# Patient Record
Sex: FEMALE | Race: WHITE | NOT HISPANIC OR LATINO
[De-identification: names, ages, dates, MRNs, and addresses within clinical notes are randomized per-mention and may not be internally consistent; named-entity substitution may affect disease eponyms.]

---

## 2017-01-12 ENCOUNTER — APPOINTMENT (OUTPATIENT)
Dept: NEUROSURGERY | Facility: CLINIC | Age: 37
End: 2017-01-12

## 2017-01-12 VITALS
HEIGHT: 63 IN | BODY MASS INDEX: 29.23 KG/M2 | DIASTOLIC BLOOD PRESSURE: 70 MMHG | SYSTOLIC BLOOD PRESSURE: 110 MMHG | WEIGHT: 165 LBS | HEART RATE: 112 BPM

## 2017-01-12 DIAGNOSIS — Q06.8 OTHER SPECIFIED CONGENITAL MALFORMATIONS OF SPINAL CORD: ICD-10-CM

## 2017-01-17 PROBLEM — Q06.8 TETHERED CORD SYNDROME: Status: ACTIVE | Noted: 2017-01-17

## 2017-01-19 ENCOUNTER — APPOINTMENT (OUTPATIENT)
Dept: SPINE | Facility: CLINIC | Age: 37
End: 2017-01-19

## 2017-01-19 VITALS
WEIGHT: 165 LBS | HEIGHT: 63 IN | DIASTOLIC BLOOD PRESSURE: 74 MMHG | SYSTOLIC BLOOD PRESSURE: 114 MMHG | BODY MASS INDEX: 29.23 KG/M2

## 2017-01-21 ENCOUNTER — APPOINTMENT (OUTPATIENT)
Dept: MRI IMAGING | Facility: CLINIC | Age: 37
End: 2017-01-21

## 2017-01-21 ENCOUNTER — APPOINTMENT (OUTPATIENT)
Dept: CT IMAGING | Facility: CLINIC | Age: 37
End: 2017-01-21

## 2017-01-21 ENCOUNTER — OUTPATIENT (OUTPATIENT)
Dept: OUTPATIENT SERVICES | Facility: HOSPITAL | Age: 37
LOS: 1 days | End: 2017-01-21
Payer: MEDICARE

## 2017-01-21 DIAGNOSIS — Z00.8 ENCOUNTER FOR OTHER GENERAL EXAMINATION: ICD-10-CM

## 2017-01-21 DIAGNOSIS — Z98.89 OTHER SPECIFIED POSTPROCEDURAL STATES: Chronic | ICD-10-CM

## 2017-01-21 DIAGNOSIS — Q06.8 OTHER SPECIFIED CONGENITAL MALFORMATIONS OF SPINAL CORD: ICD-10-CM

## 2017-01-21 PROCEDURE — 72131 CT LUMBAR SPINE W/O DYE: CPT

## 2017-01-21 PROCEDURE — 72148 MRI LUMBAR SPINE W/O DYE: CPT

## 2017-01-21 PROCEDURE — 76376 3D RENDER W/INTRP POSTPROCES: CPT

## 2017-01-25 ENCOUNTER — APPOINTMENT (OUTPATIENT)
Dept: SPINE | Facility: CLINIC | Age: 37
End: 2017-01-25

## 2017-01-25 VITALS
SYSTOLIC BLOOD PRESSURE: 124 MMHG | HEIGHT: 63 IN | WEIGHT: 165 LBS | BODY MASS INDEX: 29.23 KG/M2 | DIASTOLIC BLOOD PRESSURE: 78 MMHG

## 2017-01-29 ENCOUNTER — OUTPATIENT (OUTPATIENT)
Dept: OUTPATIENT SERVICES | Facility: HOSPITAL | Age: 37
LOS: 1 days | End: 2017-01-29
Payer: MEDICARE

## 2017-01-29 ENCOUNTER — APPOINTMENT (OUTPATIENT)
Dept: MRI IMAGING | Facility: CLINIC | Age: 37
End: 2017-01-29

## 2017-01-29 DIAGNOSIS — Z98.89 OTHER SPECIFIED POSTPROCEDURAL STATES: Chronic | ICD-10-CM

## 2017-01-29 DIAGNOSIS — Q06.8 OTHER SPECIFIED CONGENITAL MALFORMATIONS OF SPINAL CORD: ICD-10-CM

## 2017-01-29 PROCEDURE — 72157 MRI CHEST SPINE W/O & W/DYE: CPT

## 2017-01-29 PROCEDURE — A9585: CPT

## 2017-02-01 ENCOUNTER — OUTPATIENT (OUTPATIENT)
Dept: OUTPATIENT SERVICES | Facility: HOSPITAL | Age: 37
LOS: 1 days | End: 2017-02-01
Payer: MEDICARE

## 2017-02-01 ENCOUNTER — APPOINTMENT (OUTPATIENT)
Dept: RADIOLOGY | Facility: HOSPITAL | Age: 37
End: 2017-02-01

## 2017-02-01 DIAGNOSIS — Q06.8 OTHER SPECIFIED CONGENITAL MALFORMATIONS OF SPINAL CORD: ICD-10-CM

## 2017-02-01 DIAGNOSIS — Z98.89 OTHER SPECIFIED POSTPROCEDURAL STATES: Chronic | ICD-10-CM

## 2017-02-01 PROCEDURE — 62305 MYELOGRAPHY LUMBAR INJECTION: CPT

## 2017-02-01 PROCEDURE — 72132 CT LUMBAR SPINE W/DYE: CPT

## 2017-02-01 PROCEDURE — 72129 CT CHEST SPINE W/DYE: CPT

## 2017-02-03 ENCOUNTER — EMERGENCY (EMERGENCY)
Facility: HOSPITAL | Age: 37
LOS: 1 days | Discharge: HOME | End: 2017-02-03
Attending: EMERGENCY MEDICINE | Admitting: EMERGENCY MEDICINE
Payer: MEDICARE

## 2017-02-03 VITALS
SYSTOLIC BLOOD PRESSURE: 144 MMHG | OXYGEN SATURATION: 96 % | HEART RATE: 99 BPM | DIASTOLIC BLOOD PRESSURE: 87 MMHG | RESPIRATION RATE: 17 BRPM | WEIGHT: 164.91 LBS | HEIGHT: 63 IN | TEMPERATURE: 97 F

## 2017-02-03 VITALS
RESPIRATION RATE: 18 BRPM | HEART RATE: 77 BPM | TEMPERATURE: 98 F | DIASTOLIC BLOOD PRESSURE: 67 MMHG | SYSTOLIC BLOOD PRESSURE: 128 MMHG | OXYGEN SATURATION: 98 %

## 2017-02-03 DIAGNOSIS — Z98.89 OTHER SPECIFIED POSTPROCEDURAL STATES: Chronic | ICD-10-CM

## 2017-02-03 DIAGNOSIS — R51 HEADACHE: ICD-10-CM

## 2017-02-03 LAB
ALBUMIN SERPL ELPH-MCNC: 4.1 G/DL — SIGNIFICANT CHANGE UP (ref 3.3–5)
ALP SERPL-CCNC: 70 U/L — SIGNIFICANT CHANGE UP (ref 40–120)
ALT FLD-CCNC: 11 U/L RC — SIGNIFICANT CHANGE UP (ref 10–45)
ANION GAP SERPL CALC-SCNC: 10 MMOL/L — SIGNIFICANT CHANGE UP (ref 5–17)
APTT BLD: 35.4 SEC — SIGNIFICANT CHANGE UP (ref 27.5–37.4)
AST SERPL-CCNC: 10 U/L — SIGNIFICANT CHANGE UP (ref 10–40)
BASOPHILS # BLD AUTO: 0 K/UL — SIGNIFICANT CHANGE UP (ref 0–0.2)
BASOPHILS NFR BLD AUTO: 0.2 % — SIGNIFICANT CHANGE UP (ref 0–2)
BILIRUB SERPL-MCNC: 0.1 MG/DL — LOW (ref 0.2–1.2)
BUN SERPL-MCNC: 12 MG/DL — SIGNIFICANT CHANGE UP (ref 7–23)
CALCIUM SERPL-MCNC: 9.1 MG/DL — SIGNIFICANT CHANGE UP (ref 8.4–10.5)
CHLORIDE SERPL-SCNC: 107 MMOL/L — SIGNIFICANT CHANGE UP (ref 96–108)
CO2 SERPL-SCNC: 25 MMOL/L — SIGNIFICANT CHANGE UP (ref 22–31)
CREAT SERPL-MCNC: 0.72 MG/DL — SIGNIFICANT CHANGE UP (ref 0.5–1.3)
EOSINOPHIL # BLD AUTO: 0.1 K/UL — SIGNIFICANT CHANGE UP (ref 0–0.5)
EOSINOPHIL NFR BLD AUTO: 1.4 % — SIGNIFICANT CHANGE UP (ref 0–6)
GLUCOSE SERPL-MCNC: 90 MG/DL — SIGNIFICANT CHANGE UP (ref 70–99)
HCT VFR BLD CALC: 44.4 % — SIGNIFICANT CHANGE UP (ref 34.5–45)
HGB BLD-MCNC: 15 G/DL — SIGNIFICANT CHANGE UP (ref 11.5–15.5)
INR BLD: 1.01 RATIO — SIGNIFICANT CHANGE UP (ref 0.88–1.16)
LYMPHOCYTES # BLD AUTO: 3.3 K/UL — SIGNIFICANT CHANGE UP (ref 1–3.3)
LYMPHOCYTES # BLD AUTO: 33.7 % — SIGNIFICANT CHANGE UP (ref 13–44)
MCHC RBC-ENTMCNC: 32.2 PG — SIGNIFICANT CHANGE UP (ref 27–34)
MCHC RBC-ENTMCNC: 33.8 GM/DL — SIGNIFICANT CHANGE UP (ref 32–36)
MCV RBC AUTO: 95.4 FL — SIGNIFICANT CHANGE UP (ref 80–100)
MONOCYTES # BLD AUTO: 0.6 K/UL — SIGNIFICANT CHANGE UP (ref 0–0.9)
MONOCYTES NFR BLD AUTO: 5.9 % — SIGNIFICANT CHANGE UP (ref 2–14)
NEUTROPHILS # BLD AUTO: 5.8 K/UL — SIGNIFICANT CHANGE UP (ref 1.8–7.4)
NEUTROPHILS NFR BLD AUTO: 58.8 % — SIGNIFICANT CHANGE UP (ref 43–77)
PLATELET # BLD AUTO: 268 K/UL — SIGNIFICANT CHANGE UP (ref 150–400)
POTASSIUM SERPL-MCNC: 4.1 MMOL/L — SIGNIFICANT CHANGE UP (ref 3.5–5.3)
POTASSIUM SERPL-SCNC: 4.1 MMOL/L — SIGNIFICANT CHANGE UP (ref 3.5–5.3)
PROT SERPL-MCNC: 6.7 G/DL — SIGNIFICANT CHANGE UP (ref 6–8.3)
PROTHROM AB SERPL-ACNC: 10.9 SEC — SIGNIFICANT CHANGE UP (ref 10–13.1)
RBC # BLD: 4.66 M/UL — SIGNIFICANT CHANGE UP (ref 3.8–5.2)
RBC # FLD: 11.3 % — SIGNIFICANT CHANGE UP (ref 10.3–14.5)
SODIUM SERPL-SCNC: 142 MMOL/L — SIGNIFICANT CHANGE UP (ref 135–145)
WBC # BLD: 9.8 K/UL — SIGNIFICANT CHANGE UP (ref 3.8–10.5)
WBC # FLD AUTO: 9.8 K/UL — SIGNIFICANT CHANGE UP (ref 3.8–10.5)

## 2017-02-03 PROCEDURE — 99284 EMERGENCY DEPT VISIT MOD MDM: CPT | Mod: 25

## 2017-02-03 PROCEDURE — 85027 COMPLETE CBC AUTOMATED: CPT

## 2017-02-03 PROCEDURE — 85610 PROTHROMBIN TIME: CPT

## 2017-02-03 PROCEDURE — 80053 COMPREHEN METABOLIC PANEL: CPT

## 2017-02-03 PROCEDURE — 99284 EMERGENCY DEPT VISIT MOD MDM: CPT

## 2017-02-03 PROCEDURE — 85730 THROMBOPLASTIN TIME PARTIAL: CPT

## 2017-02-03 RX ORDER — CAFFEINE 200 MG
300 TABLET ORAL ONCE
Qty: 0 | Refills: 0 | Status: DISCONTINUED | OUTPATIENT
Start: 2017-02-03 | End: 2017-02-03

## 2017-02-03 RX ORDER — SODIUM CHLORIDE 9 MG/ML
1000 INJECTION INTRAMUSCULAR; INTRAVENOUS; SUBCUTANEOUS ONCE
Qty: 0 | Refills: 0 | Status: COMPLETED | OUTPATIENT
Start: 2017-02-03 | End: 2017-02-03

## 2017-02-03 RX ORDER — DIPHENHYDRAMINE HCL 50 MG
25 CAPSULE ORAL ONCE
Qty: 0 | Refills: 0 | Status: COMPLETED | OUTPATIENT
Start: 2017-02-03 | End: 2017-02-03

## 2017-02-03 RX ORDER — CAFFEINE/SODIUM BENZOATE 250 MG/ML
500 VIAL (ML) INJECTION ONCE
Qty: 0 | Refills: 0 | Status: COMPLETED | OUTPATIENT
Start: 2017-02-03 | End: 2017-02-03

## 2017-02-03 RX ORDER — METOCLOPRAMIDE HCL 10 MG
10 TABLET ORAL ONCE
Qty: 0 | Refills: 0 | Status: DISCONTINUED | OUTPATIENT
Start: 2017-02-03 | End: 2017-02-03

## 2017-02-03 RX ORDER — METOCLOPRAMIDE HCL 10 MG
10 TABLET ORAL ONCE
Qty: 0 | Refills: 0 | Status: COMPLETED | OUTPATIENT
Start: 2017-02-03 | End: 2017-02-03

## 2017-02-03 RX ADMIN — SODIUM CHLORIDE 1000 MILLILITER(S): 9 INJECTION INTRAMUSCULAR; INTRAVENOUS; SUBCUTANEOUS at 18:54

## 2017-02-03 NOTE — ED ADULT NURSE NOTE - OBJECTIVE STATEMENT
Per pt report, she had a myelogram done two days ago for a as part of pre-surgical work-up for a spinal condition. Pt has a headache since and was told to come into the ED.  Pt is A&Ox4, skin is warm and dry, pt appears uncomfortable

## 2017-02-03 NOTE — ED PROVIDER NOTE - PHYSICAL EXAMINATION
Attending MD Garza:   VITALS: reviewed  GEN: NAD, A & O x 4  HEAD/EYES: NCAT, PERRL, EOMI, anicteric sclerae, no photophobia  ENT: mucus membranes moist, oropharynx WNL, trachea midline, no JVD  CHEST: lungs CTA with equal breath sounds bilaterally, chest wall nontender and atraumatic  CV: heart with reg rhythm S1, S2, no murmur; distal pulses intact and symmetric bilaterally  ABDOMEN: normoactive bowel sounds, soft, ND, nontender, no masses  : no CVAT, no suprapubic tenderness or fullness  MSK: extremities atraumatic and nontender, no edema. back is without midline tenderness, deformity or stepoff and is ranged painlessly. the neck has no midline tenderness, deformity, or stepoff, and is ranged painlessly. no meningismus  SKIN: punctate lumbar lesion on back without surrounding edema, erythema or bruising, otherwise normal  NEURO: CNII-XII are intact, strength is 5/5 throughout upper and lower extremities symmetric bilaterally, Sensation is intact to light touch throughout trunk and extremities symmetric bilaterally, Cerebellar function is intact by FNF, rapid alternating movments and Heel-to-Shin,   PSYCH: Affect appropriate

## 2017-02-03 NOTE — ED PROVIDER NOTE - PROGRESS NOTE DETAILS
Neurosurgery agrees low pressure headache, no other acute management, agree with anesthesia assessment for blood patch. Anesthesia at bedside Pain substantially improved after blood patch. Pt declining other pain rx at this time. Will lay supine >1hr then will be discharged with planned neurosurgical f/u.

## 2017-02-03 NOTE — ED ADULT NURSE NOTE - CHPI ED SYMPTOMS NEG
no dizziness/no vomiting/no numbness/no nausea/no loss of consciousness/no change in level of consciousness/no blurred vision/no fever/no confusion

## 2017-02-03 NOTE — ED PROVIDER NOTE - GASTROINTESTINAL NEGATIVE STATEMENT, MLM
no abdominal pain, no bloating, no constipation, no diarrhea, no nausea and no vomiting. mild nausea is present with severe pain. no abdominal pain, no bloating, no constipation, no diarrhea and no vomiting.

## 2017-02-03 NOTE — ED PROVIDER NOTE - FAMILY HISTORY
Father  Still living? Yes, Estimated age: 68  Family history of drug abuse, Age at diagnosis: Age Unknown  Family history of hepatitis C, Age at diagnosis: Age Unknown     Sibling  Still living? Yes, Estimated age: 41  Family history of coronary artery disease, Age at diagnosis: Age Unknown

## 2017-02-03 NOTE — ED PROVIDER NOTE - MEDICAL DECISION MAKING DETAILS
ATTENDING MD Kayla: positional headache 1d after myelogram. no infectious symptoms. likely low-pressure headache from procedure. neurosurgery and anesthesia will be consulted.

## 2017-02-03 NOTE — ED ADULT NURSE NOTE - PSH
H/O:   2004  History of cholecystectomy  laparoscopic 2005  History of tonsillectomy  age 18  S/P lumbar laminectomy  , complicated with CSF leak

## 2017-02-03 NOTE — ED PROVIDER NOTE - ATTENDING CONTRIBUTION TO CARE
ATTENDING MD: I, Robert Garza, have seen and evaluated this patient with the advance practice clinician.  I have discussed the history, exam ,and aspects of care with the ACP.  I have reviewed the ACP's note. I agree with the findings  unless other wise stated in the HPI, PE, MDM, and progress notes.

## 2017-02-03 NOTE — ED PROVIDER NOTE - CARE PLAN
Principal Discharge DX:	Headache, post-lumbar puncture  Instructions for follow-up, activity and diet:	Follow up with your Primary Care Physician within the next 2-3 days  Follow up with your Neurosurgeon Dr Murray within the next 3 days  Continue your current medication regimen  Return to the Emergency Room if you experience new or worsening symptoms

## 2017-02-03 NOTE — ED ADULT NURSE REASSESSMENT NOTE - NS ED NURSE REASSESS COMMENT FT1
Report received from POONAM Morocho. As per MD hold pain medication at this time. Following anesthesia procedure pt. states "the headache is much better." Speech clear. Breathing unlabored on RA. Skin warm pink and dry. Comfort measures in place. Family at bedside.

## 2017-02-03 NOTE — ED ADULT NURSE NOTE - PMH
Asthma  no recent exacerbations  Cervical disc disease    Irritable bowel syndrome without diarrhea  with constipation  Low back pain  chronic x 2 1/2 years

## 2017-02-03 NOTE — ED PROVIDER NOTE - PLAN OF CARE
Follow up with your Primary Care Physician within the next 2-3 days  Follow up with your Neurosurgeon Dr Murray within the next 3 days  Continue your current medication regimen  Return to the Emergency Room if you experience new or worsening symptoms

## 2017-02-03 NOTE — ED PROVIDER NOTE - OBJECTIVE STATEMENT
36 year old female w prior spinal surgery and CT Myelogram performed yesterday presents w complaint of positional pounding headache 9/10 in severity which is worse when she sits, stands and walks. She has had a prior CSF leak post op and feels that her current pain is similar. 36 year old female w prior spinal surgery and CT Myelogram performed yesterday presents w complaint of positional pounding headache 9/10 in severity which is worse when she sits, stands and walks. Better when laying supine. She has had a prior CSF leak post op and feels that her current pain is similar. No associated fevers, chills, visual disturbances, sensory changes, motor weakness. No difficulty urinating, urinary or fecal incontinence.

## 2017-02-08 ENCOUNTER — APPOINTMENT (OUTPATIENT)
Dept: SPINE | Facility: CLINIC | Age: 37
End: 2017-02-08

## 2017-02-08 VITALS
DIASTOLIC BLOOD PRESSURE: 80 MMHG | SYSTOLIC BLOOD PRESSURE: 126 MMHG | BODY MASS INDEX: 29.23 KG/M2 | HEIGHT: 63 IN | WEIGHT: 165 LBS

## 2017-02-24 ENCOUNTER — TRANSCRIPTION ENCOUNTER (OUTPATIENT)
Age: 37
End: 2017-02-24

## 2017-03-21 ENCOUNTER — TRANSCRIPTION ENCOUNTER (OUTPATIENT)
Age: 37
End: 2017-03-21

## 2017-03-24 ENCOUNTER — OUTPATIENT (OUTPATIENT)
Dept: OUTPATIENT SERVICES | Facility: HOSPITAL | Age: 37
LOS: 1 days | End: 2017-03-24
Payer: MEDICARE

## 2017-03-24 VITALS
SYSTOLIC BLOOD PRESSURE: 98 MMHG | RESPIRATION RATE: 18 BRPM | HEIGHT: 63 IN | HEART RATE: 91 BPM | DIASTOLIC BLOOD PRESSURE: 70 MMHG | TEMPERATURE: 98 F | WEIGHT: 186.07 LBS | OXYGEN SATURATION: 95 %

## 2017-03-24 DIAGNOSIS — Z98.890 OTHER SPECIFIED POSTPROCEDURAL STATES: Chronic | ICD-10-CM

## 2017-03-24 DIAGNOSIS — Z98.89 OTHER SPECIFIED POSTPROCEDURAL STATES: Chronic | ICD-10-CM

## 2017-03-24 DIAGNOSIS — Q06.8 OTHER SPECIFIED CONGENITAL MALFORMATIONS OF SPINAL CORD: ICD-10-CM

## 2017-03-24 DIAGNOSIS — Z01.818 ENCOUNTER FOR OTHER PREPROCEDURAL EXAMINATION: ICD-10-CM

## 2017-03-24 DIAGNOSIS — M54.5 LOW BACK PAIN: ICD-10-CM

## 2017-03-24 LAB
BLD GP AB SCN SERPL QL: NEGATIVE — SIGNIFICANT CHANGE UP
HCT VFR BLD CALC: 41.4 % — SIGNIFICANT CHANGE UP (ref 34.5–45)
HGB BLD-MCNC: 13.6 G/DL — SIGNIFICANT CHANGE UP (ref 11.5–15.5)
MCHC RBC-ENTMCNC: 30.9 PG — SIGNIFICANT CHANGE UP (ref 27–34)
MCHC RBC-ENTMCNC: 32.9 GM/DL — SIGNIFICANT CHANGE UP (ref 32–36)
MCV RBC AUTO: 94.1 FL — SIGNIFICANT CHANGE UP (ref 80–100)
PLATELET # BLD AUTO: 285 K/UL — SIGNIFICANT CHANGE UP (ref 150–400)
RBC # BLD: 4.4 M/UL — SIGNIFICANT CHANGE UP (ref 3.8–5.2)
RBC # FLD: 12.3 % — SIGNIFICANT CHANGE UP (ref 10.3–14.5)
RH IG SCN BLD-IMP: POSITIVE — SIGNIFICANT CHANGE UP
WBC # BLD: 8.07 K/UL — SIGNIFICANT CHANGE UP (ref 3.8–10.5)
WBC # FLD AUTO: 8.07 K/UL — SIGNIFICANT CHANGE UP (ref 3.8–10.5)

## 2017-03-24 PROCEDURE — 85027 COMPLETE CBC AUTOMATED: CPT

## 2017-03-24 PROCEDURE — 86850 RBC ANTIBODY SCREEN: CPT

## 2017-03-24 PROCEDURE — 86900 BLOOD TYPING SEROLOGIC ABO: CPT

## 2017-03-24 PROCEDURE — 86901 BLOOD TYPING SEROLOGIC RH(D): CPT

## 2017-03-24 RX ORDER — VANCOMYCIN HCL 1 G
1250 VIAL (EA) INTRAVENOUS ONCE
Qty: 0 | Refills: 0 | Status: DISCONTINUED | OUTPATIENT
Start: 2017-04-07 | End: 2017-04-07

## 2017-03-24 RX ORDER — SODIUM CHLORIDE 9 MG/ML
3 INJECTION INTRAMUSCULAR; INTRAVENOUS; SUBCUTANEOUS EVERY 8 HOURS
Qty: 0 | Refills: 0 | Status: DISCONTINUED | OUTPATIENT
Start: 2017-04-07 | End: 2017-04-07

## 2017-03-24 NOTE — H&P PST ADULT - PMH
Asthma  no recent exacerbations  Cervical disc disease    Chiari I malformation    EDS (Su-Danlos syndrome)    Irritable bowel syndrome without diarrhea  with constipation  Low back pain  chronic x 2 1/2 years

## 2017-03-24 NOTE — H&P PST ADULT - HISTORY OF PRESENT ILLNESS
36 y/o F PMH asthma, no recent inhaler or steroid use,  low back pain for the past 5 1/2 years, reports that she "woke up one morning with back pain."  Presents today for T12 vertebral column resection, T10-L2 lumbar instrumentation and fusion. 38 y/o F PMH asthma, no recent inhaler or steroid use,  Chiari malformation, EDS, low back pain for the past 5 1/2 years, reports that she "woke up one morning with back pain."  Presents today for T12 vertebral column resection, T10-L2 lumbar instrumentation and fusion.

## 2017-03-24 NOTE — H&P PST ADULT - PSH
H/O:   2004  History of cholecystectomy  laparoscopic 2005  History of tonsillectomy  age 18  S/P lumbar laminectomy  , complicated with CSF leak, S/P blood patch  S/P myelogram  c/b CSF leak, spinal headache, S/P blood patch

## 2017-03-27 ENCOUNTER — APPOINTMENT (OUTPATIENT)
Dept: SPINE | Facility: CLINIC | Age: 37
End: 2017-03-27

## 2017-04-07 ENCOUNTER — APPOINTMENT (OUTPATIENT)
Dept: SPINE | Facility: HOSPITAL | Age: 37
End: 2017-04-07

## 2017-04-07 ENCOUNTER — OUTPATIENT (OUTPATIENT)
Dept: INPATIENT UNIT | Facility: HOSPITAL | Age: 37
LOS: 1 days | End: 2017-04-07
Payer: MEDICARE

## 2017-04-07 VITALS
SYSTOLIC BLOOD PRESSURE: 117 MMHG | DIASTOLIC BLOOD PRESSURE: 80 MMHG | RESPIRATION RATE: 16 BRPM | WEIGHT: 186.07 LBS | HEIGHT: 63 IN | TEMPERATURE: 98 F | OXYGEN SATURATION: 98 % | HEART RATE: 102 BPM

## 2017-04-07 VITALS
OXYGEN SATURATION: 100 % | RESPIRATION RATE: 16 BRPM | SYSTOLIC BLOOD PRESSURE: 136 MMHG | DIASTOLIC BLOOD PRESSURE: 77 MMHG | HEART RATE: 92 BPM

## 2017-04-07 DIAGNOSIS — Z98.89 OTHER SPECIFIED POSTPROCEDURAL STATES: Chronic | ICD-10-CM

## 2017-04-07 DIAGNOSIS — Q06.8 OTHER SPECIFIED CONGENITAL MALFORMATIONS OF SPINAL CORD: ICD-10-CM

## 2017-04-07 DIAGNOSIS — Z98.890 OTHER SPECIFIED POSTPROCEDURAL STATES: Chronic | ICD-10-CM

## 2017-04-07 LAB
HCG UR QL: NEGATIVE — SIGNIFICANT CHANGE UP
RH IG SCN BLD-IMP: POSITIVE — SIGNIFICANT CHANGE UP

## 2017-04-07 PROCEDURE — 86900 BLOOD TYPING SEROLOGIC ABO: CPT

## 2017-04-07 PROCEDURE — 81025 URINE PREGNANCY TEST: CPT

## 2017-04-07 PROCEDURE — 86901 BLOOD TYPING SEROLOGIC RH(D): CPT

## 2017-04-11 ENCOUNTER — APPOINTMENT (OUTPATIENT)
Dept: SPINE | Facility: CLINIC | Age: 37
End: 2017-04-11

## 2017-04-11 VITALS
BODY MASS INDEX: 29.23 KG/M2 | HEART RATE: 111 BPM | DIASTOLIC BLOOD PRESSURE: 70 MMHG | SYSTOLIC BLOOD PRESSURE: 119 MMHG | WEIGHT: 165 LBS | HEIGHT: 63 IN

## 2017-04-11 DIAGNOSIS — M54.9 DORSALGIA, UNSPECIFIED: ICD-10-CM

## 2017-04-20 ENCOUNTER — OUTPATIENT (OUTPATIENT)
Dept: OUTPATIENT SERVICES | Facility: HOSPITAL | Age: 37
LOS: 1 days | End: 2017-04-20
Payer: MEDICARE

## 2017-04-20 VITALS
HEIGHT: 63 IN | SYSTOLIC BLOOD PRESSURE: 138 MMHG | OXYGEN SATURATION: 100 % | TEMPERATURE: 98 F | RESPIRATION RATE: 15 BRPM | WEIGHT: 160.06 LBS | DIASTOLIC BLOOD PRESSURE: 88 MMHG | HEART RATE: 107 BPM

## 2017-04-20 DIAGNOSIS — Z98.89 OTHER SPECIFIED POSTPROCEDURAL STATES: Chronic | ICD-10-CM

## 2017-04-20 DIAGNOSIS — M54.5 LOW BACK PAIN: ICD-10-CM

## 2017-04-20 DIAGNOSIS — Q06.8 OTHER SPECIFIED CONGENITAL MALFORMATIONS OF SPINAL CORD: ICD-10-CM

## 2017-04-20 DIAGNOSIS — Z98.890 OTHER SPECIFIED POSTPROCEDURAL STATES: Chronic | ICD-10-CM

## 2017-04-20 DIAGNOSIS — Z01.818 ENCOUNTER FOR OTHER PREPROCEDURAL EXAMINATION: ICD-10-CM

## 2017-04-20 LAB
BLD GP AB SCN SERPL QL: NEGATIVE — SIGNIFICANT CHANGE UP
RH IG SCN BLD-IMP: POSITIVE — SIGNIFICANT CHANGE UP

## 2017-04-20 PROCEDURE — 86850 RBC ANTIBODY SCREEN: CPT

## 2017-04-20 PROCEDURE — 86900 BLOOD TYPING SEROLOGIC ABO: CPT

## 2017-04-20 PROCEDURE — 86901 BLOOD TYPING SEROLOGIC RH(D): CPT

## 2017-04-20 RX ORDER — CLONAZEPAM 1 MG
1 TABLET ORAL
Qty: 0 | Refills: 0 | COMMUNITY

## 2017-04-20 NOTE — H&P PST ADULT - HISTORY OF PRESENT ILLNESS
36 y/o F PMH asthma, no recent inhaler or steroid use,  Chiari malformation, EDS, low back pain for the past 5 1/2 years, reports that she "woke up one morning with back pain."  Presents today for T12 vertebral column resection, T10-L2 lumbar instrumentation and fusion. 36 y/o F PMH asthma, no recent inhaler or steroid use, Chiari malformation, EDS, low back pain for the past 5 1/2 years, reports that she "woke up one morning with back pain."  Presents today for T12 vertebral column resection, T10-L2 lumbar instrumentation and fusion. 38 y/o F PMH asthma, no recent inhaler or steroid use, Chiari malformation, EDS, low back pain for the past 5 1/2 years, reports that she "woke up one morning with back pain."  Presents today for T12 vertebral column resection, T10-L2 lumbar instrumentation and fusion.    Addendum - Pt's surgery was canceled in March - 38 y/o F PMH asthma, no recent inhaler or steroid use, Chiari malformation, EDS, low back pain for the past 5 1/2 years, reports that she "woke up one morning with back pain."  Presents today for T12 vertebral column resection, T10-L2 lumbar instrumentation and fusion.    Addendum - Pt's surgery was canceled in April - 38 y/o female with pmhx of Chiari malformation, EDS, tethered cord syndrome, worsening low back pain and neck pain for the past several years with progressive weakness in her bilateral LE. She states she was originially scheduled for surgery in April but once she was in [facility], Dr. Murray did not proceed with the procedure 38 y/o female with pmhx of Chiari malformation, EDS, tethered cord syndrome, worsening low back pain and neck pain for the past several years with progressive weakness in her bilateral LE. She states she was originially scheduled for surgery in April but the surgery was not performed "because there were not enough people for neuromonitoring." Presents now for T12 vertebral column resection, T10-L2 instrumentation and fusion.

## 2017-04-20 NOTE — H&P PST ADULT - MALLAMPATI CLASS
Class II - visualization of the soft palate, fauces, and uvula Class I (easy) - visualization of the soft palate, fauces, uvula, and both anterior and posterior pillars

## 2017-04-20 NOTE — H&P PST ADULT - FAMILY HISTORY
<<-----Click on this checkbox to enter Family History Family history of drug abuse     Family history of hepatitis C     Sibling  Still living? Yes, Estimated age: 41  Family history of coronary artery disease, Age at diagnosis: Age Unknown

## 2017-04-20 NOTE — H&P PST ADULT - PMH
Asthma  no recent exacerbations  Cervical disc disease    Chiari I malformation    EDS (Su-Danlos syndrome)    Irritable bowel syndrome without diarrhea  with constipation  Low back pain  chronic x 2 1/2 years Asthma  no recent exacerbations  Cervical disc disease    Chiari I malformation    EDS (Su-Danlos syndrome)    Irritable bowel syndrome without diarrhea  with constipation  Low back pain  chronic x 2 1/2 years  Tethered cord syndrome

## 2017-05-04 ENCOUNTER — APPOINTMENT (OUTPATIENT)
Dept: SPINE | Facility: HOSPITAL | Age: 37
End: 2017-05-04

## 2017-05-04 ENCOUNTER — INPATIENT (INPATIENT)
Facility: HOSPITAL | Age: 37
LOS: 4 days | Discharge: HOME | DRG: 30 | End: 2017-05-09
Attending: NEUROLOGICAL SURGERY | Admitting: NEUROLOGICAL SURGERY
Payer: MEDICARE

## 2017-05-04 ENCOUNTER — TRANSCRIPTION ENCOUNTER (OUTPATIENT)
Age: 37
End: 2017-05-04

## 2017-05-04 VITALS
DIASTOLIC BLOOD PRESSURE: 85 MMHG | OXYGEN SATURATION: 100 % | RESPIRATION RATE: 19 BRPM | HEIGHT: 63 IN | TEMPERATURE: 98 F | WEIGHT: 160.06 LBS | HEART RATE: 94 BPM | SYSTOLIC BLOOD PRESSURE: 134 MMHG

## 2017-05-04 DIAGNOSIS — Z98.89 OTHER SPECIFIED POSTPROCEDURAL STATES: Chronic | ICD-10-CM

## 2017-05-04 DIAGNOSIS — Z98.890 OTHER SPECIFIED POSTPROCEDURAL STATES: Chronic | ICD-10-CM

## 2017-05-04 DIAGNOSIS — Q06.8 OTHER SPECIFIED CONGENITAL MALFORMATIONS OF SPINAL CORD: ICD-10-CM

## 2017-05-04 LAB
ALBUMIN SERPL ELPH-MCNC: 2.8 G/DL — LOW (ref 3.3–5)
ALP SERPL-CCNC: 68 U/L — SIGNIFICANT CHANGE UP (ref 40–120)
ALT FLD-CCNC: 261 U/L RC — HIGH (ref 10–45)
ANION GAP SERPL CALC-SCNC: 12 MMOL/L — SIGNIFICANT CHANGE UP (ref 5–17)
AST SERPL-CCNC: 294 U/L — HIGH (ref 10–40)
BASOPHILS # BLD AUTO: 0 K/UL — SIGNIFICANT CHANGE UP (ref 0–0.2)
BASOPHILS NFR BLD AUTO: 0.1 % — SIGNIFICANT CHANGE UP (ref 0–2)
BILIRUB SERPL-MCNC: 0.6 MG/DL — SIGNIFICANT CHANGE UP (ref 0.2–1.2)
BUN SERPL-MCNC: 8 MG/DL — SIGNIFICANT CHANGE UP (ref 7–23)
CALCIUM SERPL-MCNC: 7.2 MG/DL — LOW (ref 8.4–10.5)
CHLORIDE SERPL-SCNC: 109 MMOL/L — HIGH (ref 96–108)
CO2 SERPL-SCNC: 19 MMOL/L — LOW (ref 22–31)
CREAT SERPL-MCNC: 0.7 MG/DL — SIGNIFICANT CHANGE UP (ref 0.5–1.3)
EOSINOPHIL # BLD AUTO: 0 K/UL — SIGNIFICANT CHANGE UP (ref 0–0.5)
EOSINOPHIL NFR BLD AUTO: 0.1 % — SIGNIFICANT CHANGE UP (ref 0–6)
GLUCOSE SERPL-MCNC: 159 MG/DL — HIGH (ref 70–99)
HCG UR QL: NEGATIVE — SIGNIFICANT CHANGE UP
HCT VFR BLD CALC: 31.6 % — LOW (ref 34.5–45)
HGB BLD-MCNC: 11.3 G/DL — LOW (ref 11.5–15.5)
LYMPHOCYTES # BLD AUTO: 0.7 K/UL — LOW (ref 1–3.3)
LYMPHOCYTES # BLD AUTO: 5.1 % — LOW (ref 13–44)
MAGNESIUM SERPL-MCNC: 2.2 MG/DL — SIGNIFICANT CHANGE UP (ref 1.6–2.6)
MCHC RBC-ENTMCNC: 33.2 PG — SIGNIFICANT CHANGE UP (ref 27–34)
MCHC RBC-ENTMCNC: 35.8 GM/DL — SIGNIFICANT CHANGE UP (ref 32–36)
MCV RBC AUTO: 92.7 FL — SIGNIFICANT CHANGE UP (ref 80–100)
MONOCYTES # BLD AUTO: 0.4 K/UL — SIGNIFICANT CHANGE UP (ref 0–0.9)
MONOCYTES NFR BLD AUTO: 3.3 % — SIGNIFICANT CHANGE UP (ref 2–14)
NEUTROPHILS # BLD AUTO: 12.2 K/UL — HIGH (ref 1.8–7.4)
NEUTROPHILS NFR BLD AUTO: 91.4 % — HIGH (ref 43–77)
PHOSPHATE SERPL-MCNC: 3.6 MG/DL — SIGNIFICANT CHANGE UP (ref 2.5–4.5)
PLATELET # BLD AUTO: 177 K/UL — SIGNIFICANT CHANGE UP (ref 150–400)
POTASSIUM SERPL-MCNC: 4.4 MMOL/L — SIGNIFICANT CHANGE UP (ref 3.5–5.3)
POTASSIUM SERPL-SCNC: 4.4 MMOL/L — SIGNIFICANT CHANGE UP (ref 3.5–5.3)
PROT SERPL-MCNC: 4.7 G/DL — LOW (ref 6–8.3)
RBC # BLD: 3.41 M/UL — LOW (ref 3.8–5.2)
RBC # FLD: 11.1 % — SIGNIFICANT CHANGE UP (ref 10.3–14.5)
SODIUM SERPL-SCNC: 140 MMOL/L — SIGNIFICANT CHANGE UP (ref 135–145)
WBC # BLD: 13.3 K/UL — HIGH (ref 3.8–10.5)
WBC # FLD AUTO: 13.3 K/UL — HIGH (ref 3.8–10.5)

## 2017-05-04 PROCEDURE — 22610 ARTHRD PST TQ 1NTRSPC THRC: CPT

## 2017-05-04 PROCEDURE — 63101 REMOVE VERT BODY DCMPRN THRC: CPT | Mod: 82

## 2017-05-04 PROCEDURE — 22842 INSERT SPINE FIXATION DEVICE: CPT | Mod: 82

## 2017-05-04 PROCEDURE — 22610 ARTHRD PST TQ 1NTRSPC THRC: CPT | Mod: 82

## 2017-05-04 PROCEDURE — 22614 ARTHRD PST TQ 1NTRSPC EA ADD: CPT | Mod: 82

## 2017-05-04 PROCEDURE — 22842 INSERT SPINE FIXATION DEVICE: CPT

## 2017-05-04 PROCEDURE — 63101 REMOVE VERT BODY DCMPRN THRC: CPT

## 2017-05-04 PROCEDURE — 22614 ARTHRD PST TQ 1NTRSPC EA ADD: CPT

## 2017-05-04 RX ORDER — ONDANSETRON 8 MG/1
4 TABLET, FILM COATED ORAL EVERY 6 HOURS
Qty: 0 | Refills: 0 | Status: DISCONTINUED | OUTPATIENT
Start: 2017-05-04 | End: 2017-05-06

## 2017-05-04 RX ORDER — CLONAZEPAM 1 MG
1 TABLET ORAL THREE TIMES A DAY
Qty: 0 | Refills: 0 | Status: DISCONTINUED | OUTPATIENT
Start: 2017-05-04 | End: 2017-05-09

## 2017-05-04 RX ORDER — NALOXONE HYDROCHLORIDE 4 MG/.1ML
0.1 SPRAY NASAL
Qty: 0 | Refills: 0 | Status: DISCONTINUED | OUTPATIENT
Start: 2017-05-04 | End: 2017-05-06

## 2017-05-04 RX ORDER — HYDROMORPHONE HYDROCHLORIDE 2 MG/ML
30 INJECTION INTRAMUSCULAR; INTRAVENOUS; SUBCUTANEOUS
Qty: 0 | Refills: 0 | Status: DISCONTINUED | OUTPATIENT
Start: 2017-05-04 | End: 2017-05-06

## 2017-05-04 RX ORDER — VANCOMYCIN HCL 1 G
1000 VIAL (EA) INTRAVENOUS EVERY 12 HOURS
Qty: 0 | Refills: 0 | Status: COMPLETED | OUTPATIENT
Start: 2017-05-04 | End: 2017-05-05

## 2017-05-04 RX ORDER — DULOXETINE HYDROCHLORIDE 30 MG/1
30 CAPSULE, DELAYED RELEASE ORAL DAILY
Qty: 0 | Refills: 0 | Status: DISCONTINUED | OUTPATIENT
Start: 2017-05-04 | End: 2017-05-09

## 2017-05-04 RX ORDER — HYDROMORPHONE HYDROCHLORIDE 2 MG/ML
0.5 INJECTION INTRAMUSCULAR; INTRAVENOUS; SUBCUTANEOUS
Qty: 0 | Refills: 0 | Status: DISCONTINUED | OUTPATIENT
Start: 2017-05-04 | End: 2017-05-06

## 2017-05-04 RX ORDER — TOPIRAMATE 25 MG
100 TABLET ORAL
Qty: 0 | Refills: 0 | Status: DISCONTINUED | OUTPATIENT
Start: 2017-05-04 | End: 2017-05-09

## 2017-05-04 RX ORDER — ENOXAPARIN SODIUM 100 MG/ML
40 INJECTION SUBCUTANEOUS DAILY
Qty: 0 | Refills: 0 | Status: DISCONTINUED | OUTPATIENT
Start: 2017-05-05 | End: 2017-05-09

## 2017-05-04 RX ORDER — DOCUSATE SODIUM 100 MG
100 CAPSULE ORAL THREE TIMES A DAY
Qty: 0 | Refills: 0 | Status: DISCONTINUED | OUTPATIENT
Start: 2017-05-04 | End: 2017-05-09

## 2017-05-04 RX ORDER — ACETAMINOPHEN 500 MG
650 TABLET ORAL EVERY 6 HOURS
Qty: 0 | Refills: 0 | Status: DISCONTINUED | OUTPATIENT
Start: 2017-05-04 | End: 2017-05-06

## 2017-05-04 RX ORDER — SODIUM CHLORIDE 9 MG/ML
3 INJECTION INTRAMUSCULAR; INTRAVENOUS; SUBCUTANEOUS EVERY 8 HOURS
Qty: 0 | Refills: 0 | Status: DISCONTINUED | OUTPATIENT
Start: 2017-05-04 | End: 2017-05-04

## 2017-05-04 RX ORDER — PANTOPRAZOLE SODIUM 20 MG/1
40 TABLET, DELAYED RELEASE ORAL DAILY
Qty: 0 | Refills: 0 | Status: DISCONTINUED | OUTPATIENT
Start: 2017-05-04 | End: 2017-05-09

## 2017-05-04 RX ORDER — ACETAMINOPHEN 500 MG
650 TABLET ORAL EVERY 6 HOURS
Qty: 0 | Refills: 0 | Status: DISCONTINUED | OUTPATIENT
Start: 2017-05-04 | End: 2017-05-09

## 2017-05-04 RX ORDER — DEXTROSE MONOHYDRATE, SODIUM CHLORIDE, AND POTASSIUM CHLORIDE 50; .745; 4.5 G/1000ML; G/1000ML; G/1000ML
1000 INJECTION, SOLUTION INTRAVENOUS
Qty: 0 | Refills: 0 | Status: DISCONTINUED | OUTPATIENT
Start: 2017-05-04 | End: 2017-05-09

## 2017-05-04 RX ORDER — SENNA PLUS 8.6 MG/1
2 TABLET ORAL AT BEDTIME
Qty: 0 | Refills: 0 | Status: DISCONTINUED | OUTPATIENT
Start: 2017-05-04 | End: 2017-05-09

## 2017-05-04 RX ORDER — HYDROMORPHONE HYDROCHLORIDE 2 MG/ML
1 INJECTION INTRAMUSCULAR; INTRAVENOUS; SUBCUTANEOUS
Qty: 0 | Refills: 0 | Status: DISCONTINUED | OUTPATIENT
Start: 2017-05-04 | End: 2017-05-05

## 2017-05-04 RX ORDER — CYCLOBENZAPRINE HYDROCHLORIDE 10 MG/1
5 TABLET, FILM COATED ORAL THREE TIMES A DAY
Qty: 0 | Refills: 0 | Status: DISCONTINUED | OUTPATIENT
Start: 2017-05-04 | End: 2017-05-07

## 2017-05-04 RX ADMIN — HYDROMORPHONE HYDROCHLORIDE 30 MILLILITER(S): 2 INJECTION INTRAMUSCULAR; INTRAVENOUS; SUBCUTANEOUS at 21:30

## 2017-05-04 RX ADMIN — HYDROMORPHONE HYDROCHLORIDE 30 MILLILITER(S): 2 INJECTION INTRAMUSCULAR; INTRAVENOUS; SUBCUTANEOUS at 22:05

## 2017-05-04 RX ADMIN — ONDANSETRON 4 MILLIGRAM(S): 8 TABLET, FILM COATED ORAL at 21:20

## 2017-05-04 RX ADMIN — DEXTROSE MONOHYDRATE, SODIUM CHLORIDE, AND POTASSIUM CHLORIDE 80 MILLILITER(S): 50; .745; 4.5 INJECTION, SOLUTION INTRAVENOUS at 19:56

## 2017-05-04 RX ADMIN — HYDROMORPHONE HYDROCHLORIDE 1 MILLIGRAM(S): 2 INJECTION INTRAMUSCULAR; INTRAVENOUS; SUBCUTANEOUS at 21:25

## 2017-05-04 RX ADMIN — Medication 1 MILLIGRAM(S): at 22:36

## 2017-05-04 RX ADMIN — Medication 100 MILLIGRAM(S): at 10:00

## 2017-05-04 RX ADMIN — HYDROMORPHONE HYDROCHLORIDE 1 MILLIGRAM(S): 2 INJECTION INTRAMUSCULAR; INTRAVENOUS; SUBCUTANEOUS at 21:40

## 2017-05-05 ENCOUNTER — TRANSCRIPTION ENCOUNTER (OUTPATIENT)
Age: 37
End: 2017-05-05

## 2017-05-05 LAB
CK MB BLD-MCNC: 1.3 % — SIGNIFICANT CHANGE UP (ref 0–3.5)
CK MB CFR SERPL CALC: 21.4 NG/ML — HIGH (ref 0–3.8)
CK SERPL-CCNC: 1641 U/L — HIGH (ref 25–170)
HCT VFR BLD CALC: 33.2 % — LOW (ref 34.5–45)
HGB BLD-MCNC: 11.5 G/DL — SIGNIFICANT CHANGE UP (ref 11.5–15.5)
MCHC RBC-ENTMCNC: 32.3 PG — SIGNIFICANT CHANGE UP (ref 27–34)
MCHC RBC-ENTMCNC: 34.6 GM/DL — SIGNIFICANT CHANGE UP (ref 32–36)
MCV RBC AUTO: 93.3 FL — SIGNIFICANT CHANGE UP (ref 80–100)
PLATELET # BLD AUTO: 208 K/UL — SIGNIFICANT CHANGE UP (ref 150–400)
RBC # BLD: 3.56 M/UL — LOW (ref 3.8–5.2)
RBC # FLD: 10.8 % — SIGNIFICANT CHANGE UP (ref 10.3–14.5)
TROPONIN T SERPL-MCNC: <0.01 NG/ML — SIGNIFICANT CHANGE UP (ref 0–0.06)
WBC # BLD: 14 K/UL — HIGH (ref 3.8–10.5)
WBC # FLD AUTO: 14 K/UL — HIGH (ref 3.8–10.5)

## 2017-05-05 PROCEDURE — 72128 CT CHEST SPINE W/O DYE: CPT | Mod: 26

## 2017-05-05 PROCEDURE — 72131 CT LUMBAR SPINE W/O DYE: CPT | Mod: 26

## 2017-05-05 PROCEDURE — 93010 ELECTROCARDIOGRAM REPORT: CPT

## 2017-05-05 RX ORDER — DIPHENHYDRAMINE HCL 50 MG
50 CAPSULE ORAL EVERY 4 HOURS
Qty: 0 | Refills: 0 | Status: DISCONTINUED | OUTPATIENT
Start: 2017-05-05 | End: 2017-05-05

## 2017-05-05 RX ORDER — SODIUM CHLORIDE 9 MG/ML
500 INJECTION INTRAMUSCULAR; INTRAVENOUS; SUBCUTANEOUS ONCE
Qty: 0 | Refills: 0 | Status: COMPLETED | OUTPATIENT
Start: 2017-05-05 | End: 2017-05-05

## 2017-05-05 RX ORDER — DIPHENHYDRAMINE HCL 50 MG
50 CAPSULE ORAL EVERY 4 HOURS
Qty: 0 | Refills: 0 | Status: DISCONTINUED | OUTPATIENT
Start: 2017-05-05 | End: 2017-05-09

## 2017-05-05 RX ADMIN — Medication 50 MILLIGRAM(S): at 14:52

## 2017-05-05 RX ADMIN — HYDROMORPHONE HYDROCHLORIDE 30 MILLILITER(S): 2 INJECTION INTRAMUSCULAR; INTRAVENOUS; SUBCUTANEOUS at 18:01

## 2017-05-05 RX ADMIN — CYCLOBENZAPRINE HYDROCHLORIDE 5 MILLIGRAM(S): 10 TABLET, FILM COATED ORAL at 09:44

## 2017-05-05 RX ADMIN — HYDROMORPHONE HYDROCHLORIDE 0.5 MILLIGRAM(S): 2 INJECTION INTRAMUSCULAR; INTRAVENOUS; SUBCUTANEOUS at 14:11

## 2017-05-05 RX ADMIN — SODIUM CHLORIDE 2000 MILLILITER(S): 9 INJECTION INTRAMUSCULAR; INTRAVENOUS; SUBCUTANEOUS at 23:04

## 2017-05-05 RX ADMIN — DEXTROSE MONOHYDRATE, SODIUM CHLORIDE, AND POTASSIUM CHLORIDE 80 MILLILITER(S): 50; .745; 4.5 INJECTION, SOLUTION INTRAVENOUS at 23:04

## 2017-05-05 RX ADMIN — Medication 250 MILLIGRAM(S): at 11:32

## 2017-05-05 RX ADMIN — Medication 100 MILLIGRAM(S): at 06:52

## 2017-05-05 RX ADMIN — Medication 50 MILLIGRAM(S): at 19:35

## 2017-05-05 RX ADMIN — ONDANSETRON 4 MILLIGRAM(S): 8 TABLET, FILM COATED ORAL at 09:54

## 2017-05-05 RX ADMIN — PANTOPRAZOLE SODIUM 40 MILLIGRAM(S): 20 TABLET, DELAYED RELEASE ORAL at 11:32

## 2017-05-05 RX ADMIN — DULOXETINE HYDROCHLORIDE 30 MILLIGRAM(S): 30 CAPSULE, DELAYED RELEASE ORAL at 11:33

## 2017-05-05 RX ADMIN — HYDROMORPHONE HYDROCHLORIDE 0.5 MILLIGRAM(S): 2 INJECTION INTRAMUSCULAR; INTRAVENOUS; SUBCUTANEOUS at 02:45

## 2017-05-05 RX ADMIN — DEXTROSE MONOHYDRATE, SODIUM CHLORIDE, AND POTASSIUM CHLORIDE 80 MILLILITER(S): 50; .745; 4.5 INJECTION, SOLUTION INTRAVENOUS at 07:29

## 2017-05-05 RX ADMIN — HYDROMORPHONE HYDROCHLORIDE 0.5 MILLIGRAM(S): 2 INJECTION INTRAMUSCULAR; INTRAVENOUS; SUBCUTANEOUS at 07:26

## 2017-05-05 RX ADMIN — Medication 250 MILLIGRAM(S): at 00:30

## 2017-05-05 RX ADMIN — ENOXAPARIN SODIUM 40 MILLIGRAM(S): 100 INJECTION SUBCUTANEOUS at 11:32

## 2017-05-05 RX ADMIN — Medication 100 MILLIGRAM(S): at 22:57

## 2017-05-05 RX ADMIN — Medication 100 MILLIGRAM(S): at 14:54

## 2017-05-05 RX ADMIN — HYDROMORPHONE HYDROCHLORIDE 30 MILLILITER(S): 2 INJECTION INTRAMUSCULAR; INTRAVENOUS; SUBCUTANEOUS at 14:12

## 2017-05-05 RX ADMIN — Medication 100 MILLIGRAM(S): at 18:03

## 2017-05-05 RX ADMIN — SODIUM CHLORIDE 2000 MILLILITER(S): 9 INJECTION INTRAMUSCULAR; INTRAVENOUS; SUBCUTANEOUS at 17:42

## 2017-05-05 NOTE — PHYSICAL THERAPY INITIAL EVALUATION ADULT - GAIT DEVIATIONS NOTED, PT EVAL
decreased roel/decreased weight-shifting ability/decreased step length/decreased velocity of limb motion

## 2017-05-05 NOTE — PHYSICAL THERAPY INITIAL EVALUATION ADULT - ADDITIONAL COMMENTS
Pt lives with spouse and 11y/o dtr in a private house with one flight of steps to the bedroom. Pt was ind with all ADLS and amb with a SC

## 2017-05-05 NOTE — PHYSICAL THERAPY INITIAL EVALUATION ADULT - PLANNED THERAPY INTERVENTIONS, PT EVAL
gait training/balance training/bed mobility training/transfer training/stair negotiation/strengthening

## 2017-05-05 NOTE — PHYSICAL THERAPY INITIAL EVALUATION ADULT - PERTINENT HX OF CURRENT PROBLEM, REHAB EVAL
Pt is a 36 y/o female admitted to [facility] on 5/4/17 with pmhx of Chiari malformation, EDS, tethered cord syndrome, worsening low back pain and neck pain for the past several years with progressive weakness in her bilateral LE. She states she was originially scheduled for surgery in April but the surgery was not performed "because there were not enough people for neuromonitoring."  Presents now for T12 vertebral column resection, T10-L2 instrumentation and fusion. Pt is now s/p above procedure

## 2017-05-06 LAB
ANION GAP SERPL CALC-SCNC: 9 MMOL/L — SIGNIFICANT CHANGE UP (ref 5–17)
BUN SERPL-MCNC: 5 MG/DL — LOW (ref 7–23)
CALCIUM SERPL-MCNC: 7.9 MG/DL — LOW (ref 8.4–10.5)
CHLORIDE SERPL-SCNC: 108 MMOL/L — SIGNIFICANT CHANGE UP (ref 96–108)
CO2 SERPL-SCNC: 22 MMOL/L — SIGNIFICANT CHANGE UP (ref 22–31)
CREAT SERPL-MCNC: 0.6 MG/DL — SIGNIFICANT CHANGE UP (ref 0.5–1.3)
GLUCOSE SERPL-MCNC: 94 MG/DL — SIGNIFICANT CHANGE UP (ref 70–99)
HCT VFR BLD CALC: 26.9 % — LOW (ref 34.5–45)
HGB BLD-MCNC: 9.5 G/DL — LOW (ref 11.5–15.5)
MCHC RBC-ENTMCNC: 33 PG — SIGNIFICANT CHANGE UP (ref 27–34)
MCHC RBC-ENTMCNC: 35.4 GM/DL — SIGNIFICANT CHANGE UP (ref 32–36)
MCV RBC AUTO: 93.2 FL — SIGNIFICANT CHANGE UP (ref 80–100)
PLATELET # BLD AUTO: 136 K/UL — LOW (ref 150–400)
POTASSIUM SERPL-MCNC: 3.7 MMOL/L — SIGNIFICANT CHANGE UP (ref 3.5–5.3)
POTASSIUM SERPL-SCNC: 3.7 MMOL/L — SIGNIFICANT CHANGE UP (ref 3.5–5.3)
RBC # BLD: 2.88 M/UL — LOW (ref 3.8–5.2)
RBC # FLD: 10.9 % — SIGNIFICANT CHANGE UP (ref 10.3–14.5)
SODIUM SERPL-SCNC: 139 MMOL/L — SIGNIFICANT CHANGE UP (ref 135–145)
WBC # BLD: 9.7 K/UL — SIGNIFICANT CHANGE UP (ref 3.8–10.5)
WBC # FLD AUTO: 9.7 K/UL — SIGNIFICANT CHANGE UP (ref 3.8–10.5)

## 2017-05-06 RX ORDER — HYDROMORPHONE HYDROCHLORIDE 2 MG/ML
1 INJECTION INTRAMUSCULAR; INTRAVENOUS; SUBCUTANEOUS
Qty: 0 | Refills: 0 | Status: DISCONTINUED | OUTPATIENT
Start: 2017-05-06 | End: 2017-05-06

## 2017-05-06 RX ORDER — HYDROMORPHONE HYDROCHLORIDE 2 MG/ML
30 INJECTION INTRAMUSCULAR; INTRAVENOUS; SUBCUTANEOUS
Qty: 0 | Refills: 0 | Status: DISCONTINUED | OUTPATIENT
Start: 2017-05-06 | End: 2017-05-06

## 2017-05-06 RX ORDER — HYDROMORPHONE HYDROCHLORIDE 2 MG/ML
0.5 INJECTION INTRAMUSCULAR; INTRAVENOUS; SUBCUTANEOUS
Qty: 0 | Refills: 0 | Status: DISCONTINUED | OUTPATIENT
Start: 2017-05-06 | End: 2017-05-06

## 2017-05-06 RX ORDER — HYDROMORPHONE HYDROCHLORIDE 2 MG/ML
2 INJECTION INTRAMUSCULAR; INTRAVENOUS; SUBCUTANEOUS EVERY 6 HOURS
Qty: 0 | Refills: 0 | Status: DISCONTINUED | OUTPATIENT
Start: 2017-05-06 | End: 2017-05-09

## 2017-05-06 RX ORDER — NALOXONE HYDROCHLORIDE 4 MG/.1ML
0.1 SPRAY NASAL
Qty: 0 | Refills: 0 | Status: DISCONTINUED | OUTPATIENT
Start: 2017-05-06 | End: 2017-05-06

## 2017-05-06 RX ORDER — HYDROMORPHONE HYDROCHLORIDE 2 MG/ML
0.5 INJECTION INTRAMUSCULAR; INTRAVENOUS; SUBCUTANEOUS ONCE
Qty: 0 | Refills: 0 | Status: DISCONTINUED | OUTPATIENT
Start: 2017-05-06 | End: 2017-05-06

## 2017-05-06 RX ORDER — HYDROMORPHONE HYDROCHLORIDE 2 MG/ML
4 INJECTION INTRAMUSCULAR; INTRAVENOUS; SUBCUTANEOUS EVERY 6 HOURS
Qty: 0 | Refills: 0 | Status: DISCONTINUED | OUTPATIENT
Start: 2017-05-06 | End: 2017-05-09

## 2017-05-06 RX ORDER — SODIUM CHLORIDE 9 MG/ML
500 INJECTION INTRAMUSCULAR; INTRAVENOUS; SUBCUTANEOUS ONCE
Qty: 0 | Refills: 0 | Status: COMPLETED | OUTPATIENT
Start: 2017-05-06 | End: 2017-05-06

## 2017-05-06 RX ADMIN — HYDROMORPHONE HYDROCHLORIDE 4 MILLIGRAM(S): 2 INJECTION INTRAMUSCULAR; INTRAVENOUS; SUBCUTANEOUS at 17:58

## 2017-05-06 RX ADMIN — Medication 100 MILLIGRAM(S): at 14:50

## 2017-05-06 RX ADMIN — ENOXAPARIN SODIUM 40 MILLIGRAM(S): 100 INJECTION SUBCUTANEOUS at 11:53

## 2017-05-06 RX ADMIN — HYDROMORPHONE HYDROCHLORIDE 30 MILLILITER(S): 2 INJECTION INTRAMUSCULAR; INTRAVENOUS; SUBCUTANEOUS at 10:48

## 2017-05-06 RX ADMIN — Medication 100 MILLIGRAM(S): at 05:48

## 2017-05-06 RX ADMIN — Medication 100 MILLIGRAM(S): at 21:43

## 2017-05-06 RX ADMIN — HYDROMORPHONE HYDROCHLORIDE 2 MILLIGRAM(S): 2 INJECTION INTRAMUSCULAR; INTRAVENOUS; SUBCUTANEOUS at 15:50

## 2017-05-06 RX ADMIN — Medication 100 MILLIGRAM(S): at 17:58

## 2017-05-06 RX ADMIN — HYDROMORPHONE HYDROCHLORIDE 0.5 MILLIGRAM(S): 2 INJECTION INTRAMUSCULAR; INTRAVENOUS; SUBCUTANEOUS at 23:46

## 2017-05-06 RX ADMIN — DULOXETINE HYDROCHLORIDE 30 MILLIGRAM(S): 30 CAPSULE, DELAYED RELEASE ORAL at 11:52

## 2017-05-06 RX ADMIN — HYDROMORPHONE HYDROCHLORIDE 2 MILLIGRAM(S): 2 INJECTION INTRAMUSCULAR; INTRAVENOUS; SUBCUTANEOUS at 14:50

## 2017-05-06 RX ADMIN — PANTOPRAZOLE SODIUM 40 MILLIGRAM(S): 20 TABLET, DELAYED RELEASE ORAL at 11:53

## 2017-05-06 RX ADMIN — CYCLOBENZAPRINE HYDROCHLORIDE 5 MILLIGRAM(S): 10 TABLET, FILM COATED ORAL at 01:23

## 2017-05-06 RX ADMIN — SODIUM CHLORIDE 2000 MILLILITER(S): 9 INJECTION INTRAMUSCULAR; INTRAVENOUS; SUBCUTANEOUS at 06:32

## 2017-05-06 RX ADMIN — HYDROMORPHONE HYDROCHLORIDE 4 MILLIGRAM(S): 2 INJECTION INTRAMUSCULAR; INTRAVENOUS; SUBCUTANEOUS at 18:54

## 2017-05-06 RX ADMIN — HYDROMORPHONE HYDROCHLORIDE 30 MILLILITER(S): 2 INJECTION INTRAMUSCULAR; INTRAVENOUS; SUBCUTANEOUS at 07:37

## 2017-05-06 RX ADMIN — Medication 650 MILLIGRAM(S): at 05:49

## 2017-05-06 RX ADMIN — CYCLOBENZAPRINE HYDROCHLORIDE 5 MILLIGRAM(S): 10 TABLET, FILM COATED ORAL at 14:51

## 2017-05-06 RX ADMIN — CYCLOBENZAPRINE HYDROCHLORIDE 5 MILLIGRAM(S): 10 TABLET, FILM COATED ORAL at 20:02

## 2017-05-06 RX ADMIN — Medication 650 MILLIGRAM(S): at 11:53

## 2017-05-06 NOTE — PROVIDER CONTACT NOTE (OTHER) - BACKGROUND
s/p Instrumentation with fusion
s/p T9-L2 Segmental instrumentation with fusion. Pt on PCA Dilaudid
pt s/p t9-l2 segmental instrumentation with fusion on 5/4

## 2017-05-06 NOTE — PROVIDER CONTACT NOTE (OTHER) - ASSESSMENT
100/67, , Temp 98.4. Resp 18, 100% room air. Pt denies SOB, chest pain.
BP 90/54, Hr 106, T. 97.8, Resp 18, 100% room air. Pt denies SOB, chest pain, dizziness, nausea.
BP: 101/69, HR:115, RR: 18, POX:98% RA, T: 36.7C

## 2017-05-06 NOTE — PROVIDER CONTACT NOTE (OTHER) - SITUATION
Patient is tachycardic
Patient is tachycardic and hypotensive
PA was called as patient's current HR is 115

## 2017-05-07 LAB
ALBUMIN SERPL ELPH-MCNC: 2.7 G/DL — LOW (ref 3.3–5)
ALP SERPL-CCNC: 71 U/L — SIGNIFICANT CHANGE UP (ref 40–120)
ALT FLD-CCNC: 80 U/L — HIGH (ref 10–45)
ANION GAP SERPL CALC-SCNC: 6 MMOL/L — SIGNIFICANT CHANGE UP (ref 5–17)
AST SERPL-CCNC: 33 U/L — SIGNIFICANT CHANGE UP (ref 10–40)
BILIRUB SERPL-MCNC: 0.4 MG/DL — SIGNIFICANT CHANGE UP (ref 0.2–1.2)
BUN SERPL-MCNC: 6 MG/DL — LOW (ref 7–23)
CALCIUM SERPL-MCNC: 8 MG/DL — LOW (ref 8.4–10.5)
CHLORIDE SERPL-SCNC: 105 MMOL/L — SIGNIFICANT CHANGE UP (ref 96–108)
CO2 SERPL-SCNC: 17 MMOL/L — LOW (ref 22–31)
CREAT SERPL-MCNC: 0.55 MG/DL — SIGNIFICANT CHANGE UP (ref 0.5–1.3)
GLUCOSE SERPL-MCNC: 94 MG/DL — SIGNIFICANT CHANGE UP (ref 70–99)
HCT VFR BLD CALC: 26.2 % — LOW (ref 34.5–45)
HGB BLD-MCNC: 8.4 G/DL — LOW (ref 11.5–15.5)
MCHC RBC-ENTMCNC: 29.9 PG — SIGNIFICANT CHANGE UP (ref 27–34)
MCHC RBC-ENTMCNC: 32.1 GM/DL — SIGNIFICANT CHANGE UP (ref 32–36)
MCV RBC AUTO: 93.2 FL — SIGNIFICANT CHANGE UP (ref 80–100)
PLATELET # BLD AUTO: 163 K/UL — SIGNIFICANT CHANGE UP (ref 150–400)
POTASSIUM SERPL-MCNC: 3.9 MMOL/L — SIGNIFICANT CHANGE UP (ref 3.5–5.3)
POTASSIUM SERPL-SCNC: 3.9 MMOL/L — SIGNIFICANT CHANGE UP (ref 3.5–5.3)
PROT SERPL-MCNC: 4.9 G/DL — LOW (ref 6–8.3)
RBC # BLD: 2.81 M/UL — LOW (ref 3.8–5.2)
RBC # FLD: 12.1 % — SIGNIFICANT CHANGE UP (ref 10.3–14.5)
SODIUM SERPL-SCNC: 134 MMOL/L — LOW (ref 135–145)
WBC # BLD: 8.36 K/UL — SIGNIFICANT CHANGE UP (ref 3.8–10.5)
WBC # FLD AUTO: 8.36 K/UL — SIGNIFICANT CHANGE UP (ref 3.8–10.5)

## 2017-05-07 RX ORDER — DEXAMETHASONE 0.5 MG/5ML
4 ELIXIR ORAL EVERY 6 HOURS
Qty: 0 | Refills: 0 | Status: DISCONTINUED | OUTPATIENT
Start: 2017-05-07 | End: 2017-05-09

## 2017-05-07 RX ORDER — HYDROMORPHONE HYDROCHLORIDE 2 MG/ML
0.5 INJECTION INTRAMUSCULAR; INTRAVENOUS; SUBCUTANEOUS ONCE
Qty: 0 | Refills: 0 | Status: DISCONTINUED | OUTPATIENT
Start: 2017-05-07 | End: 2017-05-07

## 2017-05-07 RX ORDER — HYDROMORPHONE HYDROCHLORIDE 2 MG/ML
1 INJECTION INTRAMUSCULAR; INTRAVENOUS; SUBCUTANEOUS ONCE
Qty: 0 | Refills: 0 | Status: DISCONTINUED | OUTPATIENT
Start: 2017-05-07 | End: 2017-05-07

## 2017-05-07 RX ORDER — HYDROMORPHONE HYDROCHLORIDE 2 MG/ML
0.5 INJECTION INTRAMUSCULAR; INTRAVENOUS; SUBCUTANEOUS EVERY 4 HOURS
Qty: 0 | Refills: 0 | Status: DISCONTINUED | OUTPATIENT
Start: 2017-05-07 | End: 2017-05-08

## 2017-05-07 RX ORDER — POLYETHYLENE GLYCOL 3350 17 G/17G
17 POWDER, FOR SOLUTION ORAL DAILY
Qty: 0 | Refills: 0 | Status: DISCONTINUED | OUTPATIENT
Start: 2017-05-07 | End: 2017-05-09

## 2017-05-07 RX ORDER — CYCLOBENZAPRINE HYDROCHLORIDE 10 MG/1
10 TABLET, FILM COATED ORAL THREE TIMES A DAY
Qty: 0 | Refills: 0 | Status: DISCONTINUED | OUTPATIENT
Start: 2017-05-07 | End: 2017-05-09

## 2017-05-07 RX ORDER — DEXAMETHASONE 0.5 MG/5ML
10 ELIXIR ORAL ONCE
Qty: 0 | Refills: 0 | Status: COMPLETED | OUTPATIENT
Start: 2017-05-07 | End: 2017-05-07

## 2017-05-07 RX ORDER — SODIUM CHLORIDE 9 MG/ML
500 INJECTION INTRAMUSCULAR; INTRAVENOUS; SUBCUTANEOUS ONCE
Qty: 0 | Refills: 0 | Status: COMPLETED | OUTPATIENT
Start: 2017-05-07 | End: 2017-05-07

## 2017-05-07 RX ADMIN — Medication 102 MILLIGRAM(S): at 13:34

## 2017-05-07 RX ADMIN — ENOXAPARIN SODIUM 40 MILLIGRAM(S): 100 INJECTION SUBCUTANEOUS at 11:09

## 2017-05-07 RX ADMIN — HYDROMORPHONE HYDROCHLORIDE 0.5 MILLIGRAM(S): 2 INJECTION INTRAMUSCULAR; INTRAVENOUS; SUBCUTANEOUS at 06:48

## 2017-05-07 RX ADMIN — HYDROMORPHONE HYDROCHLORIDE 4 MILLIGRAM(S): 2 INJECTION INTRAMUSCULAR; INTRAVENOUS; SUBCUTANEOUS at 02:35

## 2017-05-07 RX ADMIN — DULOXETINE HYDROCHLORIDE 30 MILLIGRAM(S): 30 CAPSULE, DELAYED RELEASE ORAL at 11:09

## 2017-05-07 RX ADMIN — HYDROMORPHONE HYDROCHLORIDE 1 MILLIGRAM(S): 2 INJECTION INTRAMUSCULAR; INTRAVENOUS; SUBCUTANEOUS at 09:14

## 2017-05-07 RX ADMIN — Medication 4 MILLIGRAM(S): at 18:58

## 2017-05-07 RX ADMIN — CYCLOBENZAPRINE HYDROCHLORIDE 10 MILLIGRAM(S): 10 TABLET, FILM COATED ORAL at 21:55

## 2017-05-07 RX ADMIN — HYDROMORPHONE HYDROCHLORIDE 4 MILLIGRAM(S): 2 INJECTION INTRAMUSCULAR; INTRAVENOUS; SUBCUTANEOUS at 13:08

## 2017-05-07 RX ADMIN — CYCLOBENZAPRINE HYDROCHLORIDE 5 MILLIGRAM(S): 10 TABLET, FILM COATED ORAL at 04:13

## 2017-05-07 RX ADMIN — HYDROMORPHONE HYDROCHLORIDE 4 MILLIGRAM(S): 2 INJECTION INTRAMUSCULAR; INTRAVENOUS; SUBCUTANEOUS at 14:00

## 2017-05-07 RX ADMIN — CYCLOBENZAPRINE HYDROCHLORIDE 10 MILLIGRAM(S): 10 TABLET, FILM COATED ORAL at 11:08

## 2017-05-07 RX ADMIN — HYDROMORPHONE HYDROCHLORIDE 1 MILLIGRAM(S): 2 INJECTION INTRAMUSCULAR; INTRAVENOUS; SUBCUTANEOUS at 09:45

## 2017-05-07 RX ADMIN — Medication 100 MILLIGRAM(S): at 13:28

## 2017-05-07 RX ADMIN — Medication 100 MILLIGRAM(S): at 21:55

## 2017-05-07 RX ADMIN — Medication 100 MILLIGRAM(S): at 18:58

## 2017-05-07 RX ADMIN — SODIUM CHLORIDE 2000 MILLILITER(S): 9 INJECTION INTRAMUSCULAR; INTRAVENOUS; SUBCUTANEOUS at 12:33

## 2017-05-07 RX ADMIN — HYDROMORPHONE HYDROCHLORIDE 4 MILLIGRAM(S): 2 INJECTION INTRAMUSCULAR; INTRAVENOUS; SUBCUTANEOUS at 21:00

## 2017-05-07 RX ADMIN — Medication 100 MILLIGRAM(S): at 05:34

## 2017-05-07 RX ADMIN — HYDROMORPHONE HYDROCHLORIDE 0.5 MILLIGRAM(S): 2 INJECTION INTRAMUSCULAR; INTRAVENOUS; SUBCUTANEOUS at 23:38

## 2017-05-07 RX ADMIN — HYDROMORPHONE HYDROCHLORIDE 0.5 MILLIGRAM(S): 2 INJECTION INTRAMUSCULAR; INTRAVENOUS; SUBCUTANEOUS at 00:15

## 2017-05-07 RX ADMIN — HYDROMORPHONE HYDROCHLORIDE 0.5 MILLIGRAM(S): 2 INJECTION INTRAMUSCULAR; INTRAVENOUS; SUBCUTANEOUS at 15:41

## 2017-05-07 RX ADMIN — HYDROMORPHONE HYDROCHLORIDE 0.5 MILLIGRAM(S): 2 INJECTION INTRAMUSCULAR; INTRAVENOUS; SUBCUTANEOUS at 07:15

## 2017-05-07 RX ADMIN — HYDROMORPHONE HYDROCHLORIDE 4 MILLIGRAM(S): 2 INJECTION INTRAMUSCULAR; INTRAVENOUS; SUBCUTANEOUS at 02:05

## 2017-05-07 RX ADMIN — Medication 4 MILLIGRAM(S): at 23:33

## 2017-05-07 RX ADMIN — PANTOPRAZOLE SODIUM 40 MILLIGRAM(S): 20 TABLET, DELAYED RELEASE ORAL at 11:09

## 2017-05-07 RX ADMIN — HYDROMORPHONE HYDROCHLORIDE 0.5 MILLIGRAM(S): 2 INJECTION INTRAMUSCULAR; INTRAVENOUS; SUBCUTANEOUS at 16:00

## 2017-05-07 RX ADMIN — HYDROMORPHONE HYDROCHLORIDE 4 MILLIGRAM(S): 2 INJECTION INTRAMUSCULAR; INTRAVENOUS; SUBCUTANEOUS at 20:10

## 2017-05-08 LAB
HCT VFR BLD CALC: 27.8 % — LOW (ref 34.5–45)
HGB BLD-MCNC: 9.4 G/DL — LOW (ref 11.5–15.5)
MCHC RBC-ENTMCNC: 30.8 PG — SIGNIFICANT CHANGE UP (ref 27–34)
MCHC RBC-ENTMCNC: 33.8 GM/DL — SIGNIFICANT CHANGE UP (ref 32–36)
MCV RBC AUTO: 91.1 FL — SIGNIFICANT CHANGE UP (ref 80–100)
PLATELET # BLD AUTO: 222 K/UL — SIGNIFICANT CHANGE UP (ref 150–400)
RBC # BLD: 3.05 M/UL — LOW (ref 3.8–5.2)
RBC # FLD: 12 % — SIGNIFICANT CHANGE UP (ref 10.3–14.5)
WBC # BLD: 11.77 K/UL — HIGH (ref 3.8–10.5)
WBC # FLD AUTO: 11.77 K/UL — HIGH (ref 3.8–10.5)

## 2017-05-08 RX ADMIN — ENOXAPARIN SODIUM 40 MILLIGRAM(S): 100 INJECTION SUBCUTANEOUS at 12:20

## 2017-05-08 RX ADMIN — Medication 4 MILLIGRAM(S): at 12:20

## 2017-05-08 RX ADMIN — PANTOPRAZOLE SODIUM 40 MILLIGRAM(S): 20 TABLET, DELAYED RELEASE ORAL at 12:20

## 2017-05-08 RX ADMIN — DULOXETINE HYDROCHLORIDE 30 MILLIGRAM(S): 30 CAPSULE, DELAYED RELEASE ORAL at 12:20

## 2017-05-08 RX ADMIN — Medication 100 MILLIGRAM(S): at 21:21

## 2017-05-08 RX ADMIN — Medication 100 MILLIGRAM(S): at 05:50

## 2017-05-08 RX ADMIN — HYDROMORPHONE HYDROCHLORIDE 4 MILLIGRAM(S): 2 INJECTION INTRAMUSCULAR; INTRAVENOUS; SUBCUTANEOUS at 04:00

## 2017-05-08 RX ADMIN — HYDROMORPHONE HYDROCHLORIDE 0.5 MILLIGRAM(S): 2 INJECTION INTRAMUSCULAR; INTRAVENOUS; SUBCUTANEOUS at 00:10

## 2017-05-08 RX ADMIN — POLYETHYLENE GLYCOL 3350 17 GRAM(S): 17 POWDER, FOR SOLUTION ORAL at 12:21

## 2017-05-08 RX ADMIN — DEXTROSE MONOHYDRATE, SODIUM CHLORIDE, AND POTASSIUM CHLORIDE 80 MILLILITER(S): 50; .745; 4.5 INJECTION, SOLUTION INTRAVENOUS at 05:51

## 2017-05-08 RX ADMIN — HYDROMORPHONE HYDROCHLORIDE 0.5 MILLIGRAM(S): 2 INJECTION INTRAMUSCULAR; INTRAVENOUS; SUBCUTANEOUS at 07:48

## 2017-05-08 RX ADMIN — Medication 100 MILLIGRAM(S): at 14:17

## 2017-05-08 RX ADMIN — Medication 100 MILLIGRAM(S): at 17:55

## 2017-05-08 RX ADMIN — CYCLOBENZAPRINE HYDROCHLORIDE 10 MILLIGRAM(S): 10 TABLET, FILM COATED ORAL at 21:21

## 2017-05-08 RX ADMIN — HYDROMORPHONE HYDROCHLORIDE 4 MILLIGRAM(S): 2 INJECTION INTRAMUSCULAR; INTRAVENOUS; SUBCUTANEOUS at 10:29

## 2017-05-08 RX ADMIN — HYDROMORPHONE HYDROCHLORIDE 0.5 MILLIGRAM(S): 2 INJECTION INTRAMUSCULAR; INTRAVENOUS; SUBCUTANEOUS at 07:07

## 2017-05-08 RX ADMIN — HYDROMORPHONE HYDROCHLORIDE 4 MILLIGRAM(S): 2 INJECTION INTRAMUSCULAR; INTRAVENOUS; SUBCUTANEOUS at 10:04

## 2017-05-08 RX ADMIN — HYDROMORPHONE HYDROCHLORIDE 4 MILLIGRAM(S): 2 INJECTION INTRAMUSCULAR; INTRAVENOUS; SUBCUTANEOUS at 18:00

## 2017-05-08 RX ADMIN — Medication 4 MILLIGRAM(S): at 17:55

## 2017-05-08 RX ADMIN — Medication 4 MILLIGRAM(S): at 05:50

## 2017-05-08 RX ADMIN — HYDROMORPHONE HYDROCHLORIDE 4 MILLIGRAM(S): 2 INJECTION INTRAMUSCULAR; INTRAVENOUS; SUBCUTANEOUS at 18:21

## 2017-05-09 ENCOUNTER — TRANSCRIPTION ENCOUNTER (OUTPATIENT)
Age: 37
End: 2017-05-09

## 2017-05-09 VITALS
DIASTOLIC BLOOD PRESSURE: 68 MMHG | HEART RATE: 80 BPM | RESPIRATION RATE: 18 BRPM | SYSTOLIC BLOOD PRESSURE: 109 MMHG | OXYGEN SATURATION: 96 % | TEMPERATURE: 98 F

## 2017-05-09 PROCEDURE — 82550 ASSAY OF CK (CPK): CPT

## 2017-05-09 PROCEDURE — 72131 CT LUMBAR SPINE W/O DYE: CPT

## 2017-05-09 PROCEDURE — 72128 CT CHEST SPINE W/O DYE: CPT

## 2017-05-09 PROCEDURE — 84484 ASSAY OF TROPONIN QUANT: CPT

## 2017-05-09 PROCEDURE — 80053 COMPREHEN METABOLIC PANEL: CPT

## 2017-05-09 PROCEDURE — 97116 GAIT TRAINING THERAPY: CPT

## 2017-05-09 PROCEDURE — 84100 ASSAY OF PHOSPHORUS: CPT

## 2017-05-09 PROCEDURE — 97161 PT EVAL LOW COMPLEX 20 MIN: CPT

## 2017-05-09 PROCEDURE — 97110 THERAPEUTIC EXERCISES: CPT

## 2017-05-09 PROCEDURE — C1769: CPT

## 2017-05-09 PROCEDURE — 82553 CREATINE MB FRACTION: CPT

## 2017-05-09 PROCEDURE — C1889: CPT

## 2017-05-09 PROCEDURE — 83735 ASSAY OF MAGNESIUM: CPT

## 2017-05-09 PROCEDURE — 76000 FLUOROSCOPY <1 HR PHYS/QHP: CPT

## 2017-05-09 PROCEDURE — P9016: CPT

## 2017-05-09 PROCEDURE — C1713: CPT

## 2017-05-09 PROCEDURE — 97530 THERAPEUTIC ACTIVITIES: CPT

## 2017-05-09 PROCEDURE — 93005 ELECTROCARDIOGRAM TRACING: CPT

## 2017-05-09 PROCEDURE — 85027 COMPLETE CBC AUTOMATED: CPT

## 2017-05-09 PROCEDURE — 86923 COMPATIBILITY TEST ELECTRIC: CPT

## 2017-05-09 PROCEDURE — 80048 BASIC METABOLIC PNL TOTAL CA: CPT

## 2017-05-09 PROCEDURE — 81025 URINE PREGNANCY TEST: CPT

## 2017-05-09 RX ORDER — HYDROMORPHONE HYDROCHLORIDE 2 MG/ML
4 INJECTION INTRAMUSCULAR; INTRAVENOUS; SUBCUTANEOUS EVERY 4 HOURS
Qty: 0 | Refills: 0 | Status: DISCONTINUED | OUTPATIENT
Start: 2017-05-09 | End: 2017-05-09

## 2017-05-09 RX ORDER — ACETAMINOPHEN 500 MG
1000 TABLET ORAL ONCE
Qty: 0 | Refills: 0 | Status: COMPLETED | OUTPATIENT
Start: 2017-05-09 | End: 2017-05-09

## 2017-05-09 RX ORDER — HYDROMORPHONE HYDROCHLORIDE 2 MG/ML
1 INJECTION INTRAMUSCULAR; INTRAVENOUS; SUBCUTANEOUS
Qty: 30 | Refills: 0 | OUTPATIENT
Start: 2017-05-09

## 2017-05-09 RX ORDER — DEXAMETHASONE 0.5 MG/5ML
1 ELIXIR ORAL
Qty: 20 | Refills: 0 | OUTPATIENT
Start: 2017-05-09 | End: 2017-05-16

## 2017-05-09 RX ORDER — HYDROMORPHONE HYDROCHLORIDE 2 MG/ML
2 INJECTION INTRAMUSCULAR; INTRAVENOUS; SUBCUTANEOUS EVERY 4 HOURS
Qty: 0 | Refills: 0 | Status: DISCONTINUED | OUTPATIENT
Start: 2017-05-09 | End: 2017-05-09

## 2017-05-09 RX ORDER — IBUPROFEN 200 MG
1 TABLET ORAL
Qty: 0 | Refills: 0 | COMMUNITY

## 2017-05-09 RX ADMIN — ENOXAPARIN SODIUM 40 MILLIGRAM(S): 100 INJECTION SUBCUTANEOUS at 11:05

## 2017-05-09 RX ADMIN — Medication 1000 MILLIGRAM(S): at 03:09

## 2017-05-09 RX ADMIN — POLYETHYLENE GLYCOL 3350 17 GRAM(S): 17 POWDER, FOR SOLUTION ORAL at 11:05

## 2017-05-09 RX ADMIN — Medication 400 MILLIGRAM(S): at 02:54

## 2017-05-09 RX ADMIN — Medication 100 MILLIGRAM(S): at 05:25

## 2017-05-09 RX ADMIN — Medication 100 MILLIGRAM(S): at 11:06

## 2017-05-09 RX ADMIN — Medication 4 MILLIGRAM(S): at 05:25

## 2017-05-09 RX ADMIN — HYDROMORPHONE HYDROCHLORIDE 4 MILLIGRAM(S): 2 INJECTION INTRAMUSCULAR; INTRAVENOUS; SUBCUTANEOUS at 11:04

## 2017-05-09 RX ADMIN — HYDROMORPHONE HYDROCHLORIDE 4 MILLIGRAM(S): 2 INJECTION INTRAMUSCULAR; INTRAVENOUS; SUBCUTANEOUS at 05:55

## 2017-05-09 RX ADMIN — HYDROMORPHONE HYDROCHLORIDE 4 MILLIGRAM(S): 2 INJECTION INTRAMUSCULAR; INTRAVENOUS; SUBCUTANEOUS at 00:03

## 2017-05-09 RX ADMIN — PANTOPRAZOLE SODIUM 40 MILLIGRAM(S): 20 TABLET, DELAYED RELEASE ORAL at 11:03

## 2017-05-09 RX ADMIN — Medication 4 MILLIGRAM(S): at 11:03

## 2017-05-09 RX ADMIN — HYDROMORPHONE HYDROCHLORIDE 4 MILLIGRAM(S): 2 INJECTION INTRAMUSCULAR; INTRAVENOUS; SUBCUTANEOUS at 05:25

## 2017-05-09 RX ADMIN — DULOXETINE HYDROCHLORIDE 30 MILLIGRAM(S): 30 CAPSULE, DELAYED RELEASE ORAL at 11:03

## 2017-05-09 RX ADMIN — Medication 4 MILLIGRAM(S): at 00:04

## 2017-05-09 NOTE — DISCHARGE NOTE ADULT - HOSPITAL COURSE
36 y/o female with pmhx of Chiari malformation, EDS, tethered cord syndrome, worsening low back pain and neck pain for the past several years with progressive weakness in her bilateral LE.  On 5/4/17 pt went to the OR for T12 vertebral column resection, T9-L2 instrumentation and fusion. Pt treated for post operative pain with steroids. Pt reported improvement, discharged on decadron taper. Surgical drains removed. Pt was followed by Cardiology for sinus tachycardia likely underlying autonomic dysfunction. On discharge, sinus tachycardia resolved. Pt was evaluated by Physical Therapy and Home PT was recommended. Pt is medically and neurologically stable for discharge to home.

## 2017-05-09 NOTE — DISCHARGE NOTE ADULT - PATIENT PORTAL LINK FT
“You can access the Follow[facility] Patient Portal, offered by [facility], by registering with the following website: [email or web address]”

## 2017-05-09 NOTE — DISCHARGE NOTE ADULT - CARE PLAN
Principal Discharge DX:	Low back pain  Goal:	5/4/17 s/p T12 vertebral column reconstruction, T9-L2 instrumentation/fusion  Instructions for follow-up, activity and diet:	You may shower. Pat incision dry after shower. Keep incision clean and dry. Remove back dressing on Wednesday. Make appt for follow up with Dr Murray in 1 week for staple removal.  Secondary Diagnosis:	S/P lumbar fusion  Goal:	back pain relief  Instructions for follow-up, activity and diet:	Make appt for follow up with your Primary Care Physician in 1 week

## 2017-05-09 NOTE — DISCHARGE NOTE ADULT - ADDITIONAL INSTRUCTIONS
Any sign of fever, chills, lethargy or change in mental status, increased pain not relieved with pain medication or drainage from incision contact Dr Murray or go to ER.

## 2017-05-09 NOTE — DISCHARGE NOTE ADULT - CARE PROVIDERS DIRECT ADDRESSES
[email or web address]

## 2017-05-09 NOTE — DISCHARGE NOTE ADULT - MEDICATION SUMMARY - MEDICATIONS TO STOP TAKING
I will STOP taking the medications listed below when I get home from the hospital:    ibuprofen 600 mg oral tablet  -- 1 tab(s) by mouth every 6 hours, As Needed

## 2017-05-09 NOTE — DISCHARGE NOTE ADULT - CARE PROVIDER_API CALL
Jennifer Murray (DO), Neurological Surgery  [address] [place] [id number]  Phone: [phone]  Fax: [phone]

## 2017-05-09 NOTE — DISCHARGE NOTE ADULT - PLAN OF CARE
5/4/17 s/p T12 vertebral column reconstruction, T9-L2 instrumentation/fusion You may shower. Pat incision dry after shower. Keep incision clean and dry. Remove back dressing on Wednesday. Make appt for follow up with Dr Murray in 1 week for staple removal. back pain relief Make appt for follow up with your Primary Care Physician in 1 week

## 2017-05-09 NOTE — DISCHARGE NOTE ADULT - MEDICATION SUMMARY - MEDICATIONS TO TAKE
I will START or STAY ON the medications listed below when I get home from the hospital:    dexamethasone 2 mg oral tablet  -- 2 tab(s) by mouth 3 times a day x 1 day then 2 tabs 2x daily x 2 days then 1 tab 2x daily x 2 days then 1 tab daily x 2 days then stop.   -- It is very important that you take or use this exactly as directed.  Do not skip doses or discontinue unless directed by your doctor.  Obtain medical advice before taking any non-prescription drugs as some may affect the action of this medication.  Take with food or milk.    -- Indication: For pain control, tapering dose    HYDROmorphone 2 mg oral tablet  -- 1 -2 tab(s) by mouth every 4 hours, As needed, Moderate Pain (4 - 6) MDD:6  -- Indication: For pain    Topamax 100 mg oral tablet  -- 1 tab(s) by mouth 2 times a day  -- Indication: For anxiety    KlonoPIN 1 mg oral tablet  -- 1 tab(s) by mouth 3 times a day  -- Indication: For anxiety    Cymbalta 30 mg oral delayed release capsule  -- 1 cap(s) by mouth once a day  -- Indication: For anxiety    Linzess 145 mcg oral capsule  -- 1 cap(s) by mouth once a day  -- Indication: For IBS    bisacodyl 5 mg oral delayed release tablet  -- 1 tab(s) by mouth 3 times a day, As Needed  -- Indication: For constipation    Colace 100 mg oral capsule  -- 1 cap(s) by mouth 2 times a day, As Needed  -- Indication: For constipation    Flexeril  -- 10 milligram(s) by mouth 3 times a day, As Needed MDD:3 tablets  -- Indication: For muscle spasm    Chantix 1 mg oral tablet  -- 1 tab(s) by mouth 2 times a day  -- Indication: For smoking cessation

## 2017-05-09 NOTE — DISCHARGE NOTE ADULT - NS AS DC STROKE ED MATERIALS
Stroke Warning Signs and Symptoms/Prescribed Medications/Need for Followup After Discharge/Stroke Education Booklet/Call 911 for Stroke/Risk Factors for Stroke

## 2017-05-09 NOTE — DISCHARGE NOTE ADULT - NS AS ACTIVITY OBS
Walking-Indoors allowed/Stairs allowed/Walking-Outdoors allowed/No Heavy lifting/straining/Showering allowed/Do not drive or operate machinery

## 2017-05-17 ENCOUNTER — EMERGENCY (EMERGENCY)
Facility: HOSPITAL | Age: 37
LOS: 1 days | Discharge: HOME | End: 2017-05-17
Attending: EMERGENCY MEDICINE | Admitting: EMERGENCY MEDICINE
Payer: MEDICARE

## 2017-05-17 ENCOUNTER — APPOINTMENT (OUTPATIENT)
Dept: SPINE | Facility: CLINIC | Age: 37
End: 2017-05-17

## 2017-05-17 VITALS
HEART RATE: 92 BPM | DIASTOLIC BLOOD PRESSURE: 85 MMHG | OXYGEN SATURATION: 96 % | RESPIRATION RATE: 20 BRPM | SYSTOLIC BLOOD PRESSURE: 122 MMHG | TEMPERATURE: 99 F

## 2017-05-17 VITALS — HEIGHT: 63 IN | BODY MASS INDEX: 29.23 KG/M2 | WEIGHT: 165 LBS

## 2017-05-17 DIAGNOSIS — K59.00 CONSTIPATION, UNSPECIFIED: ICD-10-CM

## 2017-05-17 DIAGNOSIS — Z88.0 ALLERGY STATUS TO PENICILLIN: ICD-10-CM

## 2017-05-17 DIAGNOSIS — Z98.890 OTHER SPECIFIED POSTPROCEDURAL STATES: Chronic | ICD-10-CM

## 2017-05-17 DIAGNOSIS — Z91.041 RADIOGRAPHIC DYE ALLERGY STATUS: ICD-10-CM

## 2017-05-17 DIAGNOSIS — Z98.89 OTHER SPECIFIED POSTPROCEDURAL STATES: Chronic | ICD-10-CM

## 2017-05-17 DIAGNOSIS — Z98.1 ARTHRODESIS STATUS: ICD-10-CM

## 2017-05-17 DIAGNOSIS — R10.9 UNSPECIFIED ABDOMINAL PAIN: ICD-10-CM

## 2017-05-17 DIAGNOSIS — Z91.030 BEE ALLERGY STATUS: ICD-10-CM

## 2017-05-17 DIAGNOSIS — Z90.89 ACQUIRED ABSENCE OF OTHER ORGANS: ICD-10-CM

## 2017-05-17 DIAGNOSIS — Z88.1 ALLERGY STATUS TO OTHER ANTIBIOTIC AGENTS STATUS: ICD-10-CM

## 2017-05-17 DIAGNOSIS — J45.909 UNSPECIFIED ASTHMA, UNCOMPLICATED: ICD-10-CM

## 2017-05-17 DIAGNOSIS — Z98.890 OTHER SPECIFIED POSTPROCEDURAL STATES: ICD-10-CM

## 2017-05-17 PROCEDURE — 96372 THER/PROPH/DIAG INJ SC/IM: CPT

## 2017-05-17 PROCEDURE — 99284 EMERGENCY DEPT VISIT MOD MDM: CPT

## 2017-05-17 PROCEDURE — 99284 EMERGENCY DEPT VISIT MOD MDM: CPT | Mod: 25

## 2017-05-17 RX ORDER — HYDROMORPHONE HYDROCHLORIDE 2 MG/ML
1 INJECTION INTRAMUSCULAR; INTRAVENOUS; SUBCUTANEOUS ONCE
Qty: 0 | Refills: 0 | Status: DISCONTINUED | OUTPATIENT
Start: 2017-05-17 | End: 2017-05-17

## 2017-05-17 RX ADMIN — HYDROMORPHONE HYDROCHLORIDE 1 MILLIGRAM(S): 2 INJECTION INTRAMUSCULAR; INTRAVENOUS; SUBCUTANEOUS at 16:59

## 2017-05-17 NOTE — ED ADULT NURSE NOTE - PMH
Asthma  no recent exacerbations  Cervical disc disease    Chiari I malformation    EDS (Su-Danlos syndrome)    Irritable bowel syndrome without diarrhea  with constipation  Low back pain  chronic x 2 1/2 years  Tethered cord syndrome

## 2017-05-17 NOTE — ED PROVIDER NOTE - ATTENDING CONTRIBUTION TO CARE
38yo F hx Chiari malf, tethered cord, EDS, sp T12 vert column reconstruction, T9-L2 fusion w Dr Murray ([facility]) sent in from his office for constipation, abd pain. Was at [facility] office today fr suture removal post op. Pt states she has had similar struggles w constipation for many years and usually is able to have a BM after a regimen of stool softeners, enemas, stimulants etc however this has not worked. She attributes her recent struggles with Dilaudid she has been taking post op. States she hasn't taken any in several days however is still unable to go. Did tap water enema last night and had small BM, but still feels bloated, distended. No trouble with urination.   Gen: WNWD uncomfortable  HEENT: NCAT PERRL EOMI normal pharynx  Neck: supple  Back: staple line cdi   CV: RRR, no murmur  Lung: CTA BL  Abd: +BS soft mild diffuse tenderness ND  Ext: wwp, palp pulses, FROMx4, no cce  Neuro: A&Ox3, CN grossly intact, sensation intact, motor 5/5 throughout, ambulatory with cane   Plan: SMOG enema, analgesia, consult to [facility] resident for surgical staple removal

## 2017-05-17 NOTE — ED PROVIDER NOTE - OBJECTIVE STATEMENT
38 y/o female with pmhx of Chiari malformation, EDS, tethered cord syndrome, now POD #13 s/p T9-L2 fusion p/w abd pain.  The patient reports that she has had problems with constipation for many years.  Over the past two weeks she has noticed worsening constipation, likely attributed to narcotics after surgery.  She has been taking colace, bisacodyl and miralax at home with minimal relief. Yesterday she tried a saline enema with only passing a small amount of stool.  Over the past few days she has had worsening abdominal discomfort.  located in the upper abdomen.  Denies fevers/chillsCP, SOB. NV.  She went to her Spine surgeon, Dr. Chau, today who sent her to the ER for further management of constipation.  She reports that her back pain has started getting a little worse since stopping the dilaudid a few days ago 2/2 constipation. 36 y/o female with pmhx of Chiari malformation, EDS, tethered cord syndrome, now POD #13 s/p T9-L2 fusion p/w abd pain.  The patient reports that she has had problems with constipation for many years.  Over the past two weeks she has noticed worsening constipation, likely attributed to narcotics after surgery.  She has been taking colace, bisacodyl and miralax at home with minimal relief. Yesterday she tried a saline enema with only passing a small amount of stool.  Over the past few days she has had worsening abdominal discomfort.  located in the upper abdomen.  Denies fevers/chills CP, SOB. NV.  She went to her Spine surgeon, Dr. Murray, today who sent her to the ER for further management of constipation.  She reports that her back pain has started getting a little worse since stopping the dilaudid a few days ago 2/2 constipation.

## 2017-05-17 NOTE — ED PROVIDER NOTE - PHYSICAL EXAMINATION
GI: Nondistended, soft, Minimal epigastric TTP  Skin: midline thoracolumbar incision clean and intact.  No surrounding erythema.  Staples in place.  MSK: minimal midline TTP.  FROM in all extremitied

## 2017-05-17 NOTE — ED PROVIDER NOTE - PLAN OF CARE
1.  Stay Hydrated  2.  Continue current bowel regimen  3.  Follow up with your PMD in2-3 days       Follow up with Dr. Murray as previously scheduled  4.  Return to the ER for worsening abdominal pain, fevers/chills or any other concerning symptoms

## 2017-05-17 NOTE — ED ADULT NURSE NOTE - OBJECTIVE STATEMENT
Pt presents to the ER A+Ox3 complaining of generalized abdominal pain. Pt states has been unable to have a normal bowel movement in approximately 2 weeks. Pt denies nausea, vomiting, diarrhea, fever, chills.

## 2017-05-17 NOTE — ED PROVIDER NOTE - CARE PLAN
Principal Discharge DX:	Constipation  Instructions for follow-up, activity and diet:	1.  Stay Hydrated  2.  Continue current bowel regimen  3.  Follow up with your PMD in2-3 days       Follow up with Dr. Murray as previously scheduled  4.  Return to the ER for worsening abdominal pain, fevers/chills or any other concerning symptoms

## 2017-05-17 NOTE — ED PROVIDER NOTE - PROGRESS NOTE DETAILS
Dr. Cooper: [facility] resident Meet removed pt tracey at bedside. Patient with a large BM s/p enema.  Feeling better. -Gerardo Day PA-C Patient with a large BM s/p enema.  Feeling better requests to be DC. -Gerarod Day PA-C

## 2017-05-24 ENCOUNTER — APPOINTMENT (OUTPATIENT)
Dept: SPINE | Facility: CLINIC | Age: 37
End: 2017-05-24

## 2017-05-24 VITALS
HEIGHT: 63 IN | WEIGHT: 165 LBS | BODY MASS INDEX: 29.23 KG/M2 | DIASTOLIC BLOOD PRESSURE: 74 MMHG | SYSTOLIC BLOOD PRESSURE: 122 MMHG

## 2017-05-24 DIAGNOSIS — Z98.890 OTHER SPECIFIED POSTPROCEDURAL STATES: ICD-10-CM

## 2017-05-29 ENCOUNTER — OUTPATIENT (OUTPATIENT)
Dept: OUTPATIENT SERVICES | Facility: HOSPITAL | Age: 37
LOS: 1 days | End: 2017-05-29
Payer: MEDICARE

## 2017-05-29 ENCOUNTER — APPOINTMENT (OUTPATIENT)
Dept: RADIOLOGY | Facility: CLINIC | Age: 37
End: 2017-05-29

## 2017-05-29 DIAGNOSIS — Z00.8 ENCOUNTER FOR OTHER GENERAL EXAMINATION: ICD-10-CM

## 2017-05-29 DIAGNOSIS — Z98.89 OTHER SPECIFIED POSTPROCEDURAL STATES: Chronic | ICD-10-CM

## 2017-05-29 DIAGNOSIS — Z98.890 OTHER SPECIFIED POSTPROCEDURAL STATES: Chronic | ICD-10-CM

## 2017-05-29 PROCEDURE — 72020 X-RAY EXAM OF SPINE 1 VIEW: CPT

## 2017-05-31 ENCOUNTER — APPOINTMENT (OUTPATIENT)
Dept: SPINE | Facility: CLINIC | Age: 37
End: 2017-05-31

## 2017-05-31 VITALS
WEIGHT: 165 LBS | BODY MASS INDEX: 29.23 KG/M2 | SYSTOLIC BLOOD PRESSURE: 112 MMHG | DIASTOLIC BLOOD PRESSURE: 70 MMHG | HEIGHT: 63 IN

## 2017-05-31 RX ORDER — HYDROMORPHONE HYDROCHLORIDE 2 MG/1
2 TABLET ORAL
Qty: 120 | Refills: 0 | Status: DISCONTINUED | COMMUNITY
Start: 2017-05-31 | End: 2017-05-31

## 2017-06-22 ENCOUNTER — EMERGENCY (EMERGENCY)
Facility: HOSPITAL | Age: 37
LOS: 1 days | Discharge: HOME | End: 2017-06-22
Attending: EMERGENCY MEDICINE | Admitting: EMERGENCY MEDICINE
Payer: MEDICARE

## 2017-06-22 VITALS
SYSTOLIC BLOOD PRESSURE: 121 MMHG | DIASTOLIC BLOOD PRESSURE: 92 MMHG | HEART RATE: 131 BPM | RESPIRATION RATE: 16 BRPM | OXYGEN SATURATION: 97 % | TEMPERATURE: 99 F

## 2017-06-22 DIAGNOSIS — Z98.89 OTHER SPECIFIED POSTPROCEDURAL STATES: Chronic | ICD-10-CM

## 2017-06-22 DIAGNOSIS — Z98.890 OTHER SPECIFIED POSTPROCEDURAL STATES: Chronic | ICD-10-CM

## 2017-06-22 LAB
ALBUMIN SERPL ELPH-MCNC: 4.4 G/DL — SIGNIFICANT CHANGE UP (ref 3.3–5)
ALP SERPL-CCNC: 95 U/L — SIGNIFICANT CHANGE UP (ref 40–120)
ALT FLD-CCNC: 16 U/L RC — SIGNIFICANT CHANGE UP (ref 10–45)
ANION GAP SERPL CALC-SCNC: 13 MMOL/L — SIGNIFICANT CHANGE UP (ref 5–17)
APPEARANCE UR: CLEAR — SIGNIFICANT CHANGE UP
AST SERPL-CCNC: 15 U/L — SIGNIFICANT CHANGE UP (ref 10–40)
BACTERIA # UR AUTO: ABNORMAL /HPF
BASOPHILS # BLD AUTO: 0 K/UL — SIGNIFICANT CHANGE UP (ref 0–0.2)
BASOPHILS NFR BLD AUTO: 0.2 % — SIGNIFICANT CHANGE UP (ref 0–2)
BILIRUB SERPL-MCNC: 0.3 MG/DL — SIGNIFICANT CHANGE UP (ref 0.2–1.2)
BILIRUB UR-MCNC: NEGATIVE — SIGNIFICANT CHANGE UP
BUN SERPL-MCNC: 7 MG/DL — SIGNIFICANT CHANGE UP (ref 7–23)
CALCIUM SERPL-MCNC: 10 MG/DL — SIGNIFICANT CHANGE UP (ref 8.4–10.5)
CHLORIDE SERPL-SCNC: 106 MMOL/L — SIGNIFICANT CHANGE UP (ref 96–108)
CO2 SERPL-SCNC: 26 MMOL/L — SIGNIFICANT CHANGE UP (ref 22–31)
COLOR SPEC: SIGNIFICANT CHANGE UP
CREAT SERPL-MCNC: 0.66 MG/DL — SIGNIFICANT CHANGE UP (ref 0.5–1.3)
DIFF PNL FLD: ABNORMAL
EOSINOPHIL # BLD AUTO: 0.2 K/UL — SIGNIFICANT CHANGE UP (ref 0–0.5)
EOSINOPHIL NFR BLD AUTO: 1.8 % — SIGNIFICANT CHANGE UP (ref 0–6)
EPI CELLS # UR: SIGNIFICANT CHANGE UP /HPF
GLUCOSE SERPL-MCNC: 77 MG/DL — SIGNIFICANT CHANGE UP (ref 70–99)
GLUCOSE UR QL: NEGATIVE — SIGNIFICANT CHANGE UP
HCT VFR BLD CALC: 45.9 % — HIGH (ref 34.5–45)
HGB BLD-MCNC: 15.3 G/DL — SIGNIFICANT CHANGE UP (ref 11.5–15.5)
KETONES UR-MCNC: NEGATIVE — SIGNIFICANT CHANGE UP
LEUKOCYTE ESTERASE UR-ACNC: NEGATIVE — SIGNIFICANT CHANGE UP
LYMPHOCYTES # BLD AUTO: 2.4 K/UL — SIGNIFICANT CHANGE UP (ref 1–3.3)
LYMPHOCYTES # BLD AUTO: 26.6 % — SIGNIFICANT CHANGE UP (ref 13–44)
MCHC RBC-ENTMCNC: 29.5 PG — SIGNIFICANT CHANGE UP (ref 27–34)
MCHC RBC-ENTMCNC: 33.2 GM/DL — SIGNIFICANT CHANGE UP (ref 32–36)
MCV RBC AUTO: 88.6 FL — SIGNIFICANT CHANGE UP (ref 80–100)
MONOCYTES # BLD AUTO: 0.6 K/UL — SIGNIFICANT CHANGE UP (ref 0–0.9)
MONOCYTES NFR BLD AUTO: 6.4 % — SIGNIFICANT CHANGE UP (ref 2–14)
NEUTROPHILS # BLD AUTO: 5.8 K/UL — SIGNIFICANT CHANGE UP (ref 1.8–7.4)
NEUTROPHILS NFR BLD AUTO: 65 % — SIGNIFICANT CHANGE UP (ref 43–77)
NITRITE UR-MCNC: NEGATIVE — SIGNIFICANT CHANGE UP
PH UR: 7 — SIGNIFICANT CHANGE UP (ref 5–8)
PLATELET # BLD AUTO: 332 K/UL — SIGNIFICANT CHANGE UP (ref 150–400)
POTASSIUM SERPL-MCNC: 3.9 MMOL/L — SIGNIFICANT CHANGE UP (ref 3.5–5.3)
POTASSIUM SERPL-SCNC: 3.9 MMOL/L — SIGNIFICANT CHANGE UP (ref 3.5–5.3)
PROT SERPL-MCNC: 7.8 G/DL — SIGNIFICANT CHANGE UP (ref 6–8.3)
PROT UR-MCNC: NEGATIVE — SIGNIFICANT CHANGE UP
RBC # BLD: 5.18 M/UL — SIGNIFICANT CHANGE UP (ref 3.8–5.2)
RBC # FLD: 13.6 % — SIGNIFICANT CHANGE UP (ref 10.3–14.5)
RBC CASTS # UR COMP ASSIST: ABNORMAL /HPF (ref 0–2)
SODIUM SERPL-SCNC: 145 MMOL/L — SIGNIFICANT CHANGE UP (ref 135–145)
SP GR SPEC: <1.005 — LOW (ref 1.01–1.02)
UROBILINOGEN FLD QL: NEGATIVE — SIGNIFICANT CHANGE UP
WBC # BLD: 9 K/UL — SIGNIFICANT CHANGE UP (ref 3.8–10.5)
WBC # FLD AUTO: 9 K/UL — SIGNIFICANT CHANGE UP (ref 3.8–10.5)
WBC UR QL: NEGATIVE /HPF — SIGNIFICANT CHANGE UP (ref 0–5)

## 2017-06-22 PROCEDURE — 99284 EMERGENCY DEPT VISIT MOD MDM: CPT | Mod: 25

## 2017-06-22 PROCEDURE — 81001 URINALYSIS AUTO W/SCOPE: CPT

## 2017-06-22 PROCEDURE — 96375 TX/PRO/DX INJ NEW DRUG ADDON: CPT

## 2017-06-22 PROCEDURE — 99284 EMERGENCY DEPT VISIT MOD MDM: CPT | Mod: GC

## 2017-06-22 PROCEDURE — 85027 COMPLETE CBC AUTOMATED: CPT

## 2017-06-22 PROCEDURE — 80053 COMPREHEN METABOLIC PANEL: CPT

## 2017-06-22 PROCEDURE — 96374 THER/PROPH/DIAG INJ IV PUSH: CPT

## 2017-06-22 RX ORDER — HYDROMORPHONE HYDROCHLORIDE 2 MG/ML
1 INJECTION INTRAMUSCULAR; INTRAVENOUS; SUBCUTANEOUS ONCE
Qty: 0 | Refills: 0 | Status: DISCONTINUED | OUTPATIENT
Start: 2017-06-22 | End: 2017-06-22

## 2017-06-22 RX ORDER — ONDANSETRON 8 MG/1
4 TABLET, FILM COATED ORAL ONCE
Qty: 0 | Refills: 0 | Status: COMPLETED | OUTPATIENT
Start: 2017-06-22 | End: 2017-06-22

## 2017-06-22 RX ADMIN — HYDROMORPHONE HYDROCHLORIDE 1 MILLIGRAM(S): 2 INJECTION INTRAMUSCULAR; INTRAVENOUS; SUBCUTANEOUS at 13:29

## 2017-06-22 RX ADMIN — HYDROMORPHONE HYDROCHLORIDE 1 MILLIGRAM(S): 2 INJECTION INTRAMUSCULAR; INTRAVENOUS; SUBCUTANEOUS at 13:28

## 2017-06-22 RX ADMIN — ONDANSETRON 4 MILLIGRAM(S): 8 TABLET, FILM COATED ORAL at 13:28

## 2017-06-22 NOTE — ED PROVIDER NOTE - NS ED ROS FT
No fever, no chills, no change in vision, no change in hearing, no chest pain, no shortness of breath, no abdominal pain, no vomiting, no dysuria, + back pain, no rashes, no loss of consciousness. ~ Brandon Lassiter MD

## 2017-06-22 NOTE — ED ADULT NURSE NOTE - OBJECTIVE STATEMENT
pt with past back surg came in today after bending over and feeling a pop and loosing a little bit of urine with numbness down the right leg.on assessment a and o x 3 lungs clear abd soft non tender no swelling in extremities no n/v/d/ no fevers, pt able to move now but with some pain.

## 2017-06-22 NOTE — ED PROVIDER NOTE - PHYSICAL EXAMINATION
Physical Exam: middle aged F who is in moderate distress that has improved upon reassessment, AAOx3, NCAT, MMM, neck is supple, PERRL, CTAB, normal rate and regular rhythm, abdomen is soft and NTND, No edema, No deformity of extremities, No rashes, CN III-XII intact. 4+/5 strength in RLE, decreased sensation in RLE, decreased sensation in saddle region.  ~ Brandon Lassiter MD

## 2017-06-22 NOTE — ED PROVIDER NOTE - OBJECTIVE STATEMENT
Lower back pain radiating to RLE w/ paresthesia. Occurred after pt had . Pt w/ baseline RLE paresthesia and weakness, saddle anesthesia, but reports that these symptoms have worsened from baseline. Lower back pain radiating to RLE w/ paresthesia. Occurred after pt bent down to  an item. Pt w/ baseline RLE paresthesia and weakness, saddle anesthesia, but reports that these symptoms have worsened from baseline. Had 1 episode of incontinence of urine, but has been able to control urine since the event.     NSG: Terri

## 2017-06-22 NOTE — ED PROVIDER NOTE - CARE PLAN
Instructions for follow-up, activity and diet:	1) Please follow-up with your Primary Medical Doctor in 3-5 days. If you need to find a new physician, please call [phone]. Please see Dr. Murray in the office.   2) Return to the Emergency Department if you experiences: fevers, chills, worsening weakness, numbness, or symptoms that are new or recurrent.  3) If you have any questions or concerns, do not hesitate to contact us at [phone].  4) To alleviate the pain, please rest and take Tylenol 650 mg or Motrin 400 mg once every 6 hours as needed. Principal Discharge DX:	Back pain  Instructions for follow-up, activity and diet:	1) Please follow-up with your Primary Medical Doctor in 3-5 days. If you need to find a new physician, please call [phone]. Please see Dr. Murray in the office.   2) Return to the Emergency Department if you experiences: fevers, chills, worsening weakness, numbness, or symptoms that are new or recurrent.  3) If you have any questions or concerns, do not hesitate to contact us at [phone].  4) To alleviate the pain, please rest and take Tylenol 650 mg or Motrin 400 mg once every 6 hours as needed.  Secondary Diagnosis:	Paresthesia Principal Discharge DX:	Back pain  Instructions for follow-up, activity and diet:	1) Please follow-up with your Primary Medical Doctor in 3-5 days. If you need to find a new physician, please call [phone]. Please see Dr. Murray in the office.   2) Return to the Emergency Department if you experiences: fevers, chills, worsening weakness, numbness, or symptoms that are new or recurrent.  3) If you have any questions or concerns, do not hesitate to contact us at [phone].  4) To alleviate the pain, please rest and take Tylenol 650 mg or Motrin 400 mg once every 6 hours as needed.  Secondary Diagnosis:	Paresthesia

## 2017-06-22 NOTE — ED PROVIDER NOTE - ATTENDING CONTRIBUTION TO CARE
re-occurrence of back pain, recent surgery by Neurosurgery, initially incontinent when sudden onset of pain occurred, no bladder or bowel symptoms presently, chronic right leg parathesias (at baseline), on my exam:  awake, alert, cooperative, anxious, soft abdomen, no increase in pain with straight leg raise, able to get out of bed and walk to bathroom independently.

## 2017-06-22 NOTE — ED PROVIDER NOTE - PLAN OF CARE
1) Please follow-up with your Primary Medical Doctor in 3-5 days. If you need to find a new physician, please call [phone]. Please see Dr. Murray in the office.   2) Return to the Emergency Department if you experiences: fevers, chills, worsening weakness, numbness, or symptoms that are new or recurrent.  3) If you have any questions or concerns, do not hesitate to contact us at [phone].  4) To alleviate the pain, please rest and take Tylenol 650 mg or Motrin 400 mg once every 6 hours as needed.

## 2017-06-22 NOTE — ED PROVIDER NOTE - PROGRESS NOTE DETAILS
Brandon Lassiter MD (resident): patient reassessed, and pain has improved from 10/10 to 5/10 in severity. Discussed w/ [facility] resident Elle who is contact w/ Terri. Pt is stable for discharge. Discussed w/ [facility] resident Elle who is contact w/ Terri. Pt is stable for discharge. Continues to be pain controlled.

## 2017-06-22 NOTE — ED PROVIDER NOTE - MEDICAL DECISION MAKING DETAILS
Brandon Lassiter MD (resident): 37 F w/ recent lumbar surgery who p/w LBP rad to RLE w/ mild exacerbation of neuro symptoms of paresthesia, decreased sensation and weakness that are confirmed with objective findings on examination. However, based on patient, not significantly changed from baseline. Will contact NSG to d/w Latefi.

## 2017-06-24 ENCOUNTER — TRANSCRIPTION ENCOUNTER (OUTPATIENT)
Age: 37
End: 2017-06-24

## 2017-06-28 ENCOUNTER — APPOINTMENT (OUTPATIENT)
Dept: SPINE | Facility: CLINIC | Age: 37
End: 2017-06-28

## 2017-06-28 VITALS
DIASTOLIC BLOOD PRESSURE: 70 MMHG | HEIGHT: 63 IN | WEIGHT: 165 LBS | BODY MASS INDEX: 29.23 KG/M2 | SYSTOLIC BLOOD PRESSURE: 112 MMHG

## 2017-06-30 ENCOUNTER — APPOINTMENT (OUTPATIENT)
Dept: MRI IMAGING | Facility: CLINIC | Age: 37
End: 2017-06-30

## 2017-06-30 ENCOUNTER — OUTPATIENT (OUTPATIENT)
Dept: OUTPATIENT SERVICES | Facility: HOSPITAL | Age: 37
LOS: 1 days | End: 2017-06-30
Payer: MEDICARE

## 2017-06-30 DIAGNOSIS — Z98.89 OTHER SPECIFIED POSTPROCEDURAL STATES: Chronic | ICD-10-CM

## 2017-06-30 DIAGNOSIS — Z98.890 OTHER SPECIFIED POSTPROCEDURAL STATES: Chronic | ICD-10-CM

## 2017-06-30 DIAGNOSIS — Z98.890 OTHER SPECIFIED POSTPROCEDURAL STATES: ICD-10-CM

## 2017-06-30 PROCEDURE — 72148 MRI LUMBAR SPINE W/O DYE: CPT

## 2017-07-05 ENCOUNTER — APPOINTMENT (OUTPATIENT)
Dept: SPINE | Facility: CLINIC | Age: 37
End: 2017-07-05

## 2017-07-05 VITALS
WEIGHT: 165 LBS | SYSTOLIC BLOOD PRESSURE: 124 MMHG | HEIGHT: 63 IN | BODY MASS INDEX: 29.23 KG/M2 | DIASTOLIC BLOOD PRESSURE: 80 MMHG

## 2017-07-14 ENCOUNTER — OUTPATIENT (OUTPATIENT)
Dept: OUTPATIENT SERVICES | Facility: HOSPITAL | Age: 37
LOS: 1 days | End: 2017-07-14
Payer: MEDICARE

## 2017-07-14 DIAGNOSIS — Z98.89 OTHER SPECIFIED POSTPROCEDURAL STATES: Chronic | ICD-10-CM

## 2017-07-14 DIAGNOSIS — Z98.890 OTHER SPECIFIED POSTPROCEDURAL STATES: Chronic | ICD-10-CM

## 2017-07-14 DIAGNOSIS — Z51.89 ENCOUNTER FOR OTHER SPECIFIED AFTERCARE: ICD-10-CM

## 2017-07-14 DIAGNOSIS — Q07.00 ARNOLD-CHIARI SYNDROME WITHOUT SPINA BIFIDA OR HYDROCEPHALUS: ICD-10-CM

## 2017-08-21 ENCOUNTER — EMERGENCY (EMERGENCY)
Facility: HOSPITAL | Age: 37
LOS: 1 days | Discharge: HOME | End: 2017-08-21
Attending: EMERGENCY MEDICINE
Payer: MEDICARE

## 2017-08-21 VITALS
OXYGEN SATURATION: 100 % | SYSTOLIC BLOOD PRESSURE: 131 MMHG | HEART RATE: 96 BPM | DIASTOLIC BLOOD PRESSURE: 87 MMHG | RESPIRATION RATE: 20 BRPM

## 2017-08-21 VITALS
WEIGHT: 169.98 LBS | OXYGEN SATURATION: 99 % | RESPIRATION RATE: 30 BRPM | HEIGHT: 64 IN | HEART RATE: 134 BPM | TEMPERATURE: 98 F

## 2017-08-21 DIAGNOSIS — Z98.89 OTHER SPECIFIED POSTPROCEDURAL STATES: Chronic | ICD-10-CM

## 2017-08-21 DIAGNOSIS — Z98.890 OTHER SPECIFIED POSTPROCEDURAL STATES: Chronic | ICD-10-CM

## 2017-08-21 PROCEDURE — 93005 ELECTROCARDIOGRAM TRACING: CPT

## 2017-08-21 PROCEDURE — 99284 EMERGENCY DEPT VISIT MOD MDM: CPT | Mod: 25

## 2017-08-21 PROCEDURE — 94640 AIRWAY INHALATION TREATMENT: CPT

## 2017-08-21 PROCEDURE — 96375 TX/PRO/DX INJ NEW DRUG ADDON: CPT

## 2017-08-21 PROCEDURE — 96374 THER/PROPH/DIAG INJ IV PUSH: CPT

## 2017-08-21 PROCEDURE — 93010 ELECTROCARDIOGRAM REPORT: CPT

## 2017-08-21 PROCEDURE — 99291 CRITICAL CARE FIRST HOUR: CPT

## 2017-08-21 RX ORDER — IPRATROPIUM/ALBUTEROL SULFATE 18-103MCG
3 AEROSOL WITH ADAPTER (GRAM) INHALATION ONCE
Qty: 0 | Refills: 0 | Status: COMPLETED | OUTPATIENT
Start: 2017-08-21 | End: 2017-08-21

## 2017-08-21 RX ORDER — FAMOTIDINE 10 MG/ML
20 INJECTION INTRAVENOUS ONCE
Qty: 0 | Refills: 0 | Status: COMPLETED | OUTPATIENT
Start: 2017-08-21 | End: 2017-08-21

## 2017-08-21 RX ORDER — CLONAZEPAM 1 MG
1 TABLET ORAL
Qty: 0 | Refills: 0 | COMMUNITY

## 2017-08-21 RX ORDER — DIPHENHYDRAMINE HCL 50 MG
25 CAPSULE ORAL ONCE
Qty: 0 | Refills: 0 | Status: COMPLETED | OUTPATIENT
Start: 2017-08-21 | End: 2017-08-21

## 2017-08-21 RX ADMIN — Medication 3 MILLILITER(S): at 10:56

## 2017-08-21 RX ADMIN — Medication 3 MILLILITER(S): at 10:00

## 2017-08-21 RX ADMIN — FAMOTIDINE 20 MILLIGRAM(S): 10 INJECTION INTRAVENOUS at 10:00

## 2017-08-21 RX ADMIN — Medication 25 MILLIGRAM(S): at 10:00

## 2017-08-21 RX ADMIN — Medication 125 MILLIGRAM(S): at 10:00

## 2017-08-21 NOTE — ED PROVIDER NOTE - PHYSICAL EXAMINATION
Constitutional: Alert, obvious distress  ENT: Airway patent. Nose clear. Mouth with normal mucosa.   Head: Atraumatic.   Eyes: Clear bilaterally. PERRL.   Cardiac: + tachycardia  Respiratory: + wheezing diffusely, + tachypnea, + respiratory distress  GI: Abdomen soft, non-tender, no guarding.   : No CVA or bladder tenderness.   Musculoskeletal: FROM, no muscle or joint tenderness or swelling.   Neuro: alert and oriented, no focal deficits, no motor or sensory deficits.   Skin: Dry, small puncture wound with ecchymosis to right forearm.  no rash or urticaria  Psych: + anxious

## 2017-08-21 NOTE — ED PROVIDER NOTE - MEDICAL DECISION MAKING DETAILS
Patient given detailed discharge and return instructions and verbalized understanding.  Patient will follow up without fail.  All questions answered.

## 2017-08-21 NOTE — ED ADULT NURSE NOTE - OBJECTIVE STATEMENT
37 yof presents to ed with allergic rxn to bee sting. wheezes heard on auscultation. pt c/o chest discomfort. no diaphoresis, skin pink warm dry. abd soft nontender. moves all extremiites.

## 2017-08-21 NOTE — ED PROVIDER NOTE - NS ED ROS FT
no fever  no chest pain  + SOB  + wheezing  no abd pain  no HA  All other ROS negative except as per HPI

## 2017-08-21 NOTE — ED ADULT NURSE REASSESSMENT NOTE - NS ED NURSE REASSESS COMMENT FT1
uto bp dr evangelista at bedside fore robyn. has 20 left ac from ems
patient reports feeling better, requesting to go home, Dr. Scott made aware.

## 2017-08-21 NOTE — ED PROVIDER NOTE - PROGRESS NOTE DETAILS
PAtient's symptoms completely resolved will d/c home.  PAtient has epipen refills at home, will give steriods.

## 2017-08-21 NOTE — ED PROVIDER NOTE - OBJECTIVE STATEMENT
CC: allergic reaction  Presenting symptoms:  Patient stung twice by bee on right forearm and is allergic.  PAtient immediately became SOB and did 2 doses of SC epi with mild relief and called EMS.  EMS gave 50mg benadryl IM.  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  Pertinent Positives: SOB, chest tightness, swelling in throat  Pertinent Negatives: no fever, no cough, no abd pain, no N/V/D  Timing: sudden JPTA  Quality: tightness/SOB  Radiation: none  Severity: severe  Aggravating Factors: bee sting  Relieving Factors: epi

## 2017-08-21 NOTE — ED ADULT TRIAGE NOTE - CHIEF COMPLAINT QUOTE
pt comes to ed s/p bee sting allergic reaction. patient with wheezing bilaterally. respirations labored. patient gave herself 2 epi pens. still feels tightness in her throat and chest pain. doctor called to bridget pt. placed on cardiac monitor.

## 2017-08-21 NOTE — ED PROVIDER NOTE - CARE PLAN
Principal Discharge DX:	Acute anaphylaxis, initial encounter  Secondary Diagnosis:	Allergic reaction to bee sting

## 2017-08-21 NOTE — ED PROVIDER NOTE - CRITICAL CARE PROVIDED
additional history taking/interpretation of diagnostic studies/documentation/direct patient care (not related to procedure)

## 2017-08-22 ENCOUNTER — APPOINTMENT (OUTPATIENT)
Dept: RADIOLOGY | Facility: CLINIC | Age: 37
End: 2017-08-22

## 2017-08-22 ENCOUNTER — OUTPATIENT (OUTPATIENT)
Dept: OUTPATIENT SERVICES | Facility: HOSPITAL | Age: 37
LOS: 1 days | End: 2017-08-22
Payer: MEDICARE

## 2017-08-22 DIAGNOSIS — Z98.89 OTHER SPECIFIED POSTPROCEDURAL STATES: Chronic | ICD-10-CM

## 2017-08-22 DIAGNOSIS — Q06.8 OTHER SPECIFIED CONGENITAL MALFORMATIONS OF SPINAL CORD: ICD-10-CM

## 2017-08-22 DIAGNOSIS — Z98.890 OTHER SPECIFIED POSTPROCEDURAL STATES: Chronic | ICD-10-CM

## 2017-08-22 PROCEDURE — 72100 X-RAY EXAM L-S SPINE 2/3 VWS: CPT

## 2017-08-22 PROCEDURE — 72100 X-RAY EXAM L-S SPINE 2/3 VWS: CPT | Mod: 26

## 2017-08-23 ENCOUNTER — APPOINTMENT (OUTPATIENT)
Dept: SPINE | Facility: CLINIC | Age: 37
End: 2017-08-23
Payer: MEDICARE

## 2017-08-23 VITALS
SYSTOLIC BLOOD PRESSURE: 118 MMHG | BODY MASS INDEX: 29.23 KG/M2 | WEIGHT: 165 LBS | HEIGHT: 63 IN | DIASTOLIC BLOOD PRESSURE: 72 MMHG

## 2017-08-23 DIAGNOSIS — M50.20 OTHER CERVICAL DISC DISPLACEMENT, UNSPECIFIED CERVICAL REGION: ICD-10-CM

## 2017-08-23 PROCEDURE — 99213 OFFICE O/P EST LOW 20 MIN: CPT

## 2017-09-16 NOTE — PRE-OP CHECKLIST - TO WHOM
OT IRP Treatment      Primary Rehabilitation Diagnosis: CVA  Expected Discharge Date:  (TBD,  wants home discharge.)  Planned Discharge Destination: Home    SUBJECTIVE: Subjective: pt agreeable to occupational therapy treatment session. Daughter present for family teaching session. [employer]  utilized.  (09/16/17 1105)  Subjective/Objective Comments: At end of treatment session pt up in tilit in space w/c with call light in reach and daughter present.  (09/16/17 1105)    OBJECTIVE:  Precautions  Other Precautions: TILT in SPACE wc when tired or not supervised by family; trial of standard WC with solid back ONLY when ADULT family members who are aware of pt movement patterns are present.  If in doubt - use tilt in space WC (09/15/17 1431)  Precautions Comments: Significant pain to L elbow with any passive movement, responsition needs.   (09/14/17 1045)    See below for current functional status overview.  See OT flowsheet for full details regarding the OT therapy provided.    ASSESSMENT:   Treatment today focused on family teaching with daughter with focus on functional transfers, clothing management in standing with utilization of bedrail in prep for performing self-cares with assist x1, and discussion on plan of care.  Progress supported by participation in therapy session.    Patient limited at this time by fatigue, left-sided weakness, balance deficits, lethargy, decreased postural control, and cognitive deficits .  Patient will benefit from further skilled OT  for continued training with ADLs, functional mobility, and family teaching to help the patient meet goal of returning home with assist of family.    OT Identified Barriers to Discharge: postural control, balance, pushing patterns, activity tolerance, vision changes, cognitive changes,  LUE and LLE strength/coordination, ADLs, IADLs, family teaching  needs in prep for DC home     EDUCATION:   On this date, education was provided to patient  regarding  plan of care, self-cares and household mobility  The response to education was/were: Needs reinforcement    PLAN:   Continue skilled OT, including the following Treatment Interventions: ADL retraining;Functional transfer training;UE strengthening/ROM;Endurance training;Cognitive reorientation;Patient/Family training;Equipment eval/education;Neuro muscular reeducation;Compensatory technique education;Fine motor coordination activities (09/16/17 1105)   OT Frequency: 7 days/week (09/16/17 1105), Frequency Comments: at least 60  mintues 5 days a week (09/16/17 1105)    Treatment Plan for Next Session: Weekend - family teaching with daughters/husand re: sit to stand, pt abiltiy to sustain stance with hand on bedrail. countertop or bar while family completes clothes mgmt needs over hips, stand pivot transfers to right and left to bed, toilet.   starting to be able to do to right side, not successful to left side.     Additional Plan Considerations: Weekend - if daughter present and asking about lower body dressing/hygiene completion - review process for completion in bed/rolling only so that she can assist mother in PM       RECOMMENDATIONS FOR DISCHARGE:  Recommendations for Discharge: OT: Home, Home therapy, 24 Hour assist    PT/OT Mobility Equipment for Discharge: continue to assess  (09/16/17 1105)  PT/OT ADL Equipment for Discharge: continue to assess (09/16/17 1105)      FUNCTIONAL DATA OVERVIEW LAST 24 HOURS  ADLs   Self Cares/ADL's  Self Cares/ADL's Comments #1: focus on pants management up and down over hips in standing with assist of 1 utilizing bedrail of bed. pt performs with daughter steadying patient and assisting with pants management. Verbal cueing for technique, alertness, and positioning needed from therapist.  (09/16/17 1105)    Household mobility  Household Mobility  Sit to Stand: Minimal Assist (Min) (09/16/17 1105)  Stand to Sit: Minimal Assist (Min) (09/16/17 1105)  Stand Pivot  Transfers: Moderate Assist (Mod) (09/16/17 1105)  Toilet Transfers: Moderate Assist (Mod) (09/16/17 1105)  Transfer Equipment: gait belt (09/16/17 1105)  Sitting - Static: Supervision (09/16/17 1105)  Sitting - Dynamic: Minimal Assist (Min) (09/16/17 1105)  Standing - Static: Minimal Assist (Min) (09/16/17 1105)  Standing - Dynamic: Moderate Assist (Mod) (09/16/17 1105)  Household Mobility Comments #1: pt performs sit to stand/stand to sit transfers with min assist and stand pivot/toilet transfer with mod assist. pt performs transfers with therapist for first trials and then with daughter on second trials. pt requires level of assist due to balance deficits, decreased postural control, fatigue, left-sided weakness, lethargy, and cognitive deficits.  (09/16/17 1105)  Household Mobility Comments #2: STanding at countertop - able to stand with min assist x 2 minutes trial 1.  Trial 2 - 1 minute with min assist primarily at left knee and cues for trunk extension, increase to mod assist x 1 minute with fatigue.  (09/15/17 1431)    Home Management       Tolerance  OT Activity Tolerance  Activity Tolerance: 1:1 Activity to rest (09/16/17 1105)  Activity Tolerance Comments: fair (09/16/17 1105)    Cognition  Communication/Cognition  Communication: Clear speech (09/16/17 1105)  Overall Cognitive Status: Impaired (09/16/17 1105)  Arousal/Alertness: Delayed responses to stimuli (09/16/17 1105)  Attention: Impaired sustained attention;Impaired alternating attention;Impaired divided attention (09/16/17 1105)  Executive Functions: Impaired initiation;Impaired planning;Impaired organization;Impaired self monitoring/self awareness (09/16/17 1105)  Following Verbal Directions: Follows one step directions with increased time;Follows one step directions with repetition (09/16/17 1105)  Following Demonstrated Directions: Follows one step directions with increased time;Follows one step directions with repetition (09/16/17 1105)  Safety  Judgement: Decreased awareness of need for assistance;Decreased awareness of need for safety (09/16/17 1105)  Awareness of Deficits: Decreased awareness of deficits (09/16/17 1105)    Interventions  Other Interventions 1: time spent discussing plan of care with patient and daughter, increased time needed due to use of [employer]  (09/16/17 1105)     Interventions Comments: Improvement in pain to L elbow - tolerates positioning to minus 30 from extension; repeated encoruagement to sustain on armrest (09/15/17 9391)   OR RN

## 2017-09-21 ENCOUNTER — OUTPATIENT (OUTPATIENT)
Dept: OUTPATIENT SERVICES | Facility: HOSPITAL | Age: 37
LOS: 1 days | End: 2017-09-21
Payer: MEDICARE

## 2017-09-21 ENCOUNTER — APPOINTMENT (OUTPATIENT)
Dept: MRI IMAGING | Facility: CLINIC | Age: 37
End: 2017-09-21
Payer: MEDICARE

## 2017-09-21 DIAGNOSIS — H53.8 OTHER VISUAL DISTURBANCES: ICD-10-CM

## 2017-09-21 DIAGNOSIS — Z98.890 OTHER SPECIFIED POSTPROCEDURAL STATES: Chronic | ICD-10-CM

## 2017-09-21 DIAGNOSIS — Z98.89 OTHER SPECIFIED POSTPROCEDURAL STATES: Chronic | ICD-10-CM

## 2017-09-21 DIAGNOSIS — G93.5 COMPRESSION OF BRAIN: ICD-10-CM

## 2017-09-21 PROCEDURE — A9585: CPT

## 2017-09-21 PROCEDURE — 70553 MRI BRAIN STEM W/O & W/DYE: CPT | Mod: 26

## 2017-09-21 PROCEDURE — 70553 MRI BRAIN STEM W/O & W/DYE: CPT

## 2017-10-20 PROCEDURE — 97010 HOT OR COLD PACKS THERAPY: CPT | Mod: KX

## 2017-10-20 PROCEDURE — 97110 THERAPEUTIC EXERCISES: CPT

## 2017-10-20 PROCEDURE — 97140 MANUAL THERAPY 1/> REGIONS: CPT

## 2017-10-20 PROCEDURE — 97163 PT EVAL HIGH COMPLEX 45 MIN: CPT

## 2017-10-20 PROCEDURE — G8979: CPT | Mod: CK

## 2017-10-20 PROCEDURE — G8978: CPT | Mod: CK

## 2017-10-25 ENCOUNTER — APPOINTMENT (OUTPATIENT)
Dept: SPINE | Facility: CLINIC | Age: 37
End: 2017-10-25
Payer: MEDICARE

## 2017-10-25 VITALS
SYSTOLIC BLOOD PRESSURE: 124 MMHG | HEIGHT: 63 IN | BODY MASS INDEX: 29.23 KG/M2 | DIASTOLIC BLOOD PRESSURE: 76 MMHG | WEIGHT: 165 LBS

## 2017-10-25 PROCEDURE — 99213 OFFICE O/P EST LOW 20 MIN: CPT

## 2017-12-07 ENCOUNTER — TRANSCRIPTION ENCOUNTER (OUTPATIENT)
Age: 37
End: 2017-12-07

## 2017-12-21 ENCOUNTER — APPOINTMENT (OUTPATIENT)
Dept: OPHTHALMOLOGY | Facility: CLINIC | Age: 37
End: 2017-12-21
Payer: MEDICARE

## 2017-12-21 DIAGNOSIS — H43.393 OTHER VITREOUS OPACITIES, BILATERAL: ICD-10-CM

## 2017-12-21 PROCEDURE — 92134 CPTRZ OPH DX IMG PST SGM RTA: CPT

## 2017-12-21 PROCEDURE — 92083 EXTENDED VISUAL FIELD XM: CPT

## 2017-12-21 PROCEDURE — 99204 OFFICE O/P NEW MOD 45 MIN: CPT

## 2018-01-25 ENCOUNTER — EMERGENCY (EMERGENCY)
Facility: HOSPITAL | Age: 38
LOS: 1 days | Discharge: HOME | End: 2018-01-25
Attending: EMERGENCY MEDICINE | Admitting: EMERGENCY MEDICINE
Payer: MEDICARE

## 2018-01-25 VITALS
TEMPERATURE: 98 F | HEART RATE: 124 BPM | WEIGHT: 153 LBS | RESPIRATION RATE: 20 BRPM | DIASTOLIC BLOOD PRESSURE: 82 MMHG | SYSTOLIC BLOOD PRESSURE: 128 MMHG | HEIGHT: 63 IN | OXYGEN SATURATION: 97 %

## 2018-01-25 VITALS
SYSTOLIC BLOOD PRESSURE: 114 MMHG | RESPIRATION RATE: 18 BRPM | OXYGEN SATURATION: 98 % | HEART RATE: 90 BPM | DIASTOLIC BLOOD PRESSURE: 74 MMHG | TEMPERATURE: 99 F

## 2018-01-25 DIAGNOSIS — Z98.890 OTHER SPECIFIED POSTPROCEDURAL STATES: Chronic | ICD-10-CM

## 2018-01-25 DIAGNOSIS — Z98.89 OTHER SPECIFIED POSTPROCEDURAL STATES: Chronic | ICD-10-CM

## 2018-01-25 LAB
ALBUMIN SERPL ELPH-MCNC: 4.1 G/DL — SIGNIFICANT CHANGE UP (ref 3.3–5.2)
ALP SERPL-CCNC: 92 U/L — SIGNIFICANT CHANGE UP (ref 40–120)
ALT FLD-CCNC: 13 U/L — SIGNIFICANT CHANGE UP
ANION GAP SERPL CALC-SCNC: 11 MMOL/L — SIGNIFICANT CHANGE UP (ref 5–17)
AST SERPL-CCNC: 13 U/L — SIGNIFICANT CHANGE UP
BASOPHILS # BLD AUTO: 0 K/UL — SIGNIFICANT CHANGE UP (ref 0–0.2)
BASOPHILS NFR BLD AUTO: 0.2 % — SIGNIFICANT CHANGE UP (ref 0–2)
BILIRUB SERPL-MCNC: 0.3 MG/DL — LOW (ref 0.4–2)
BLD GP AB SCN SERPL QL: SIGNIFICANT CHANGE UP
BUN SERPL-MCNC: 8 MG/DL — SIGNIFICANT CHANGE UP (ref 8–20)
CALCIUM SERPL-MCNC: 9 MG/DL — SIGNIFICANT CHANGE UP (ref 8.6–10.2)
CHLORIDE SERPL-SCNC: 106 MMOL/L — SIGNIFICANT CHANGE UP (ref 98–107)
CO2 SERPL-SCNC: 24 MMOL/L — SIGNIFICANT CHANGE UP (ref 22–29)
CREAT SERPL-MCNC: 0.62 MG/DL — SIGNIFICANT CHANGE UP (ref 0.5–1.3)
EOSINOPHIL # BLD AUTO: 0.1 K/UL — SIGNIFICANT CHANGE UP (ref 0–0.5)
EOSINOPHIL NFR BLD AUTO: 1.1 % — SIGNIFICANT CHANGE UP (ref 0–6)
GLUCOSE SERPL-MCNC: 92 MG/DL — SIGNIFICANT CHANGE UP (ref 70–115)
HCG SERPL-ACNC: <5 MIU/ML — SIGNIFICANT CHANGE UP
HCT VFR BLD CALC: 42.9 % — SIGNIFICANT CHANGE UP (ref 37–47)
HGB BLD-MCNC: 14 G/DL — SIGNIFICANT CHANGE UP (ref 12–16)
INR BLD: 1.05 RATIO — SIGNIFICANT CHANGE UP (ref 0.88–1.16)
LYMPHOCYTES # BLD AUTO: 2.5 K/UL — SIGNIFICANT CHANGE UP (ref 1–4.8)
LYMPHOCYTES # BLD AUTO: 29.4 % — SIGNIFICANT CHANGE UP (ref 20–55)
MCHC RBC-ENTMCNC: 30.4 PG — SIGNIFICANT CHANGE UP (ref 27–31)
MCHC RBC-ENTMCNC: 32.6 G/DL — SIGNIFICANT CHANGE UP (ref 32–36)
MCV RBC AUTO: 93.3 FL — SIGNIFICANT CHANGE UP (ref 81–99)
MONOCYTES # BLD AUTO: 0.5 K/UL — SIGNIFICANT CHANGE UP (ref 0–0.8)
MONOCYTES NFR BLD AUTO: 6.1 % — SIGNIFICANT CHANGE UP (ref 3–10)
NEUTROPHILS # BLD AUTO: 5.2 K/UL — SIGNIFICANT CHANGE UP (ref 1.8–8)
NEUTROPHILS NFR BLD AUTO: 62.5 % — SIGNIFICANT CHANGE UP (ref 37–73)
PLATELET # BLD AUTO: 265 K/UL — SIGNIFICANT CHANGE UP (ref 150–400)
POTASSIUM SERPL-MCNC: 3.8 MMOL/L — SIGNIFICANT CHANGE UP (ref 3.5–5.3)
POTASSIUM SERPL-SCNC: 3.8 MMOL/L — SIGNIFICANT CHANGE UP (ref 3.5–5.3)
PROT SERPL-MCNC: 6.5 G/DL — LOW (ref 6.6–8.7)
PROTHROM AB SERPL-ACNC: 11.6 SEC — SIGNIFICANT CHANGE UP (ref 9.8–12.7)
RBC # BLD: 4.6 M/UL — SIGNIFICANT CHANGE UP (ref 4.4–5.2)
RBC # FLD: 13.1 % — SIGNIFICANT CHANGE UP (ref 11–15.6)
SODIUM SERPL-SCNC: 141 MMOL/L — SIGNIFICANT CHANGE UP (ref 135–145)
TROPONIN T SERPL-MCNC: <0.01 NG/ML — SIGNIFICANT CHANGE UP (ref 0–0.06)
TYPE + AB SCN PNL BLD: SIGNIFICANT CHANGE UP
WBC # BLD: 8.4 K/UL — SIGNIFICANT CHANGE UP (ref 4.8–10.8)
WBC # FLD AUTO: 8.4 K/UL — SIGNIFICANT CHANGE UP (ref 4.8–10.8)

## 2018-01-25 PROCEDURE — 70450 CT HEAD/BRAIN W/O DYE: CPT | Mod: 26

## 2018-01-25 PROCEDURE — 96374 THER/PROPH/DIAG INJ IV PUSH: CPT

## 2018-01-25 PROCEDURE — 99284 EMERGENCY DEPT VISIT MOD MDM: CPT

## 2018-01-25 PROCEDURE — 70450 CT HEAD/BRAIN W/O DYE: CPT

## 2018-01-25 PROCEDURE — 93010 ELECTROCARDIOGRAM REPORT: CPT

## 2018-01-25 PROCEDURE — 72125 CT NECK SPINE W/O DYE: CPT | Mod: 26

## 2018-01-25 PROCEDURE — 36415 COLL VENOUS BLD VENIPUNCTURE: CPT

## 2018-01-25 PROCEDURE — 94640 AIRWAY INHALATION TREATMENT: CPT

## 2018-01-25 PROCEDURE — 86850 RBC ANTIBODY SCREEN: CPT

## 2018-01-25 PROCEDURE — 72128 CT CHEST SPINE W/O DYE: CPT

## 2018-01-25 PROCEDURE — 99284 EMERGENCY DEPT VISIT MOD MDM: CPT | Mod: 25

## 2018-01-25 PROCEDURE — 96375 TX/PRO/DX INJ NEW DRUG ADDON: CPT

## 2018-01-25 PROCEDURE — 93005 ELECTROCARDIOGRAM TRACING: CPT

## 2018-01-25 PROCEDURE — 86901 BLOOD TYPING SEROLOGIC RH(D): CPT

## 2018-01-25 PROCEDURE — 71045 X-RAY EXAM CHEST 1 VIEW: CPT | Mod: 26

## 2018-01-25 PROCEDURE — 72128 CT CHEST SPINE W/O DYE: CPT | Mod: 26

## 2018-01-25 PROCEDURE — 72125 CT NECK SPINE W/O DYE: CPT

## 2018-01-25 PROCEDURE — 80053 COMPREHEN METABOLIC PANEL: CPT

## 2018-01-25 PROCEDURE — 86900 BLOOD TYPING SEROLOGIC ABO: CPT

## 2018-01-25 PROCEDURE — 84484 ASSAY OF TROPONIN QUANT: CPT

## 2018-01-25 PROCEDURE — 85027 COMPLETE CBC AUTOMATED: CPT

## 2018-01-25 PROCEDURE — 84702 CHORIONIC GONADOTROPIN TEST: CPT

## 2018-01-25 PROCEDURE — 85610 PROTHROMBIN TIME: CPT

## 2018-01-25 PROCEDURE — 71045 X-RAY EXAM CHEST 1 VIEW: CPT

## 2018-01-25 RX ORDER — IPRATROPIUM/ALBUTEROL SULFATE 18-103MCG
3 AEROSOL WITH ADAPTER (GRAM) INHALATION ONCE
Qty: 0 | Refills: 0 | Status: COMPLETED | OUTPATIENT
Start: 2018-01-25 | End: 2018-01-25

## 2018-01-25 RX ORDER — METOCLOPRAMIDE HCL 10 MG
10 TABLET ORAL ONCE
Qty: 0 | Refills: 0 | Status: COMPLETED | OUTPATIENT
Start: 2018-01-25 | End: 2018-01-25

## 2018-01-25 RX ORDER — KETOROLAC TROMETHAMINE 30 MG/ML
15 SYRINGE (ML) INJECTION ONCE
Qty: 0 | Refills: 0 | Status: DISCONTINUED | OUTPATIENT
Start: 2018-01-25 | End: 2018-01-25

## 2018-01-25 RX ORDER — SODIUM CHLORIDE 9 MG/ML
1000 INJECTION INTRAMUSCULAR; INTRAVENOUS; SUBCUTANEOUS ONCE
Qty: 0 | Refills: 0 | Status: COMPLETED | OUTPATIENT
Start: 2018-01-25 | End: 2018-01-25

## 2018-01-25 RX ORDER — FENTANYL CITRATE 50 UG/ML
50 INJECTION INTRAVENOUS ONCE
Qty: 0 | Refills: 0 | Status: DISCONTINUED | OUTPATIENT
Start: 2018-01-25 | End: 2018-01-25

## 2018-01-25 RX ADMIN — Medication 15 MILLIGRAM(S): at 19:17

## 2018-01-25 RX ADMIN — Medication 15 MILLIGRAM(S): at 19:06

## 2018-01-25 RX ADMIN — SODIUM CHLORIDE 1000 MILLILITER(S): 9 INJECTION INTRAMUSCULAR; INTRAVENOUS; SUBCUTANEOUS at 17:04

## 2018-01-25 RX ADMIN — FENTANYL CITRATE 50 MICROGRAM(S): 50 INJECTION INTRAVENOUS at 17:04

## 2018-01-25 RX ADMIN — Medication 10 MILLIGRAM(S): at 17:04

## 2018-01-25 RX ADMIN — Medication 3 MILLILITER(S): at 17:05

## 2018-01-25 RX ADMIN — FENTANYL CITRATE 50 MICROGRAM(S): 50 INJECTION INTRAVENOUS at 17:10

## 2018-01-25 NOTE — ED ADULT TRIAGE NOTE - CHIEF COMPLAINT QUOTE
patient syncopized down a flight of stairs, c/o left arm numbness, and general b/l leg numbness doesn't remember what happen. MD Alvarez at the bedside to rule out Trauma, Priority CT initialized.

## 2018-01-25 NOTE — ED PROVIDER NOTE - PROGRESS NOTE DETAILS
pt feeling much better. States numbness resolved.  C collar cleared; no midline tenderness and nonpainful ROM.  PT offered MRI and explained benefits - she reports h/o multiple cervical disc herniations but states she does not want to stay and will be sure to f/u with her neurosurgeon. Pt still does not recall why she fell down the stairs but  notes there were a lot of " objects" on the stairs near where she fell.  She reports h/o abnormal EKG and pending stress test this week, but denies any prior h/o syncope.  Plan EKG,/ troponin, reeval. I had in depth discussion with patient about syncope and concerning EKG changes. I showed her a copy of her EKG and was able to compare to a prior EKG  had in his phone and pointed out the TWI that were new in 2 leads. I explained how this could indicate poor oxygenation in the heart which may predispose to dysrhythmias.  I told patient that I recommend calling the patient's cardiologist, Dr. Morales for consultation and she declined stating she has an appointment with her tomorrow. I informed her that this was against my medical advice and she expressed understanding. Patient has been demonstrating full capacity throughout her ED stay and has the ability to decline treatment.  She stated that if she felt at all unwell, she would return to ER immediately.  Patient asking to go home.  at bedside.  Plan to discharge.

## 2018-01-25 NOTE — ED ADULT NURSE REASSESSMENT NOTE - NS ED NURSE REASSESS COMMENT FT1
MD Ortega at bedside, MD removed cervical collar, plan of care explained to pt, pt able to ambulate without assistance.

## 2018-01-25 NOTE — ED ADULT NURSE REASSESSMENT NOTE - NS ED NURSE REASSESS COMMENT FT1
pt in no apparent distress, resting comfortably, plan of care explained to pt, pt verbalized understanding.

## 2018-01-25 NOTE — ED PROVIDER NOTE - MEDICAL DECISION MAKING DETAILS
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 headache, neck pain, t pain s/p fall down stairs. pos LOC and amnesia. unclear etiology of fall. 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 headache, neck pain, t pain s/p fall down stairs. pos LOC and amnesia. unclear etiology of fall. CT head, t-spine, c-spine, labs, pain control and reevaluate

## 2018-01-25 NOTE — ED ADULT NURSE NOTE - OBJECTIVE STATEMENT
pt AOX4 c.o falling down flight of stairs, pt states she felt dizzy and does not remember falling, pt states she had no other complain when she woke up this morning, pt has gait imbalance.

## 2018-01-25 NOTE — ED PROVIDER NOTE - NEUROLOGICAL, MLM
cranial nerves intact. 4/5 weakness b/l lower extremities baseline, 4/5 weakness left upper extremity

## 2018-01-25 NOTE — ED PROVIDER NOTE - OBJECTIVE STATEMENT
36 y/o F with hx of asthma BIBA c/o left arm numbness, chest tightness, headache, neck pain, back pain s/p fall down the stairs today. Pt states positive LOC. She normally has weakness and numbness in b/l legs 38 y/o F with hx of asthma, Elhlers Danlos type 3 and Chiari type 1 BIBA c/o left arm numbness, chest tightness, headache, neck pain, back pain s/p fall down the stairs today. Pt states positive LOC. She normally has weakness and numbness in b/l legs. 36 y/o F with hx of asthma, Elhlers Danlos type 3 and Chiari type 1 BIBA c/o left arm numbness, chest tightness, headache, neck pain, back pain s/p fall down the stairs today. Pt states positive LOC. She normally has weakness and numbness in b/l legs. Daughter saw her take a few steps and heard her fall down the stairs.

## 2018-01-31 ENCOUNTER — INPATIENT (INPATIENT)
Facility: HOSPITAL | Age: 38
LOS: 8 days | Discharge: HOME | DRG: 471 | End: 2018-02-09
Attending: NEUROLOGICAL SURGERY | Admitting: NEUROLOGICAL SURGERY
Payer: MEDICARE

## 2018-01-31 VITALS
OXYGEN SATURATION: 100 % | RESPIRATION RATE: 15 BRPM | DIASTOLIC BLOOD PRESSURE: 93 MMHG | HEART RATE: 112 BPM | SYSTOLIC BLOOD PRESSURE: 136 MMHG | TEMPERATURE: 98 F

## 2018-01-31 DIAGNOSIS — Z98.89 OTHER SPECIFIED POSTPROCEDURAL STATES: Chronic | ICD-10-CM

## 2018-01-31 DIAGNOSIS — R55 SYNCOPE AND COLLAPSE: ICD-10-CM

## 2018-01-31 DIAGNOSIS — Z98.890 OTHER SPECIFIED POSTPROCEDURAL STATES: Chronic | ICD-10-CM

## 2018-01-31 LAB
ALBUMIN SERPL ELPH-MCNC: 4 G/DL — SIGNIFICANT CHANGE UP (ref 3.3–5)
ALP SERPL-CCNC: 73 U/L — SIGNIFICANT CHANGE UP (ref 40–120)
ALT FLD-CCNC: 12 U/L RC — SIGNIFICANT CHANGE UP (ref 10–45)
ANION GAP SERPL CALC-SCNC: 15 MMOL/L — SIGNIFICANT CHANGE UP (ref 5–17)
APPEARANCE UR: CLEAR — SIGNIFICANT CHANGE UP
AST SERPL-CCNC: 14 U/L — SIGNIFICANT CHANGE UP (ref 10–40)
BASOPHILS # BLD AUTO: 0.1 K/UL — SIGNIFICANT CHANGE UP (ref 0–0.2)
BASOPHILS NFR BLD AUTO: 0.6 % — SIGNIFICANT CHANGE UP (ref 0–2)
BILIRUB SERPL-MCNC: 0.3 MG/DL — SIGNIFICANT CHANGE UP (ref 0.2–1.2)
BILIRUB UR-MCNC: NEGATIVE — SIGNIFICANT CHANGE UP
BUN SERPL-MCNC: 11 MG/DL — SIGNIFICANT CHANGE UP (ref 7–23)
CALCIUM SERPL-MCNC: 9.3 MG/DL — SIGNIFICANT CHANGE UP (ref 8.4–10.5)
CHLORIDE SERPL-SCNC: 107 MMOL/L — SIGNIFICANT CHANGE UP (ref 96–108)
CK MB CFR SERPL CALC: 1.8 NG/ML — SIGNIFICANT CHANGE UP (ref 0–3.8)
CK SERPL-CCNC: 78 U/L — SIGNIFICANT CHANGE UP (ref 25–170)
CO2 SERPL-SCNC: 24 MMOL/L — SIGNIFICANT CHANGE UP (ref 22–31)
COLOR SPEC: SIGNIFICANT CHANGE UP
CREAT SERPL-MCNC: 0.75 MG/DL — SIGNIFICANT CHANGE UP (ref 0.5–1.3)
DIFF PNL FLD: NEGATIVE — SIGNIFICANT CHANGE UP
EOSINOPHIL # BLD AUTO: 0.1 K/UL — SIGNIFICANT CHANGE UP (ref 0–0.5)
EOSINOPHIL NFR BLD AUTO: 0.7 % — SIGNIFICANT CHANGE UP (ref 0–6)
GLUCOSE SERPL-MCNC: 93 MG/DL — SIGNIFICANT CHANGE UP (ref 70–99)
GLUCOSE UR QL: NEGATIVE — SIGNIFICANT CHANGE UP
HCG SERPL QL: NEGATIVE — SIGNIFICANT CHANGE UP
HCT VFR BLD CALC: 42.6 % — SIGNIFICANT CHANGE UP (ref 34.5–45)
HGB BLD-MCNC: 14.9 G/DL — SIGNIFICANT CHANGE UP (ref 11.5–15.5)
KETONES UR-MCNC: NEGATIVE — SIGNIFICANT CHANGE UP
LEUKOCYTE ESTERASE UR-ACNC: NEGATIVE — SIGNIFICANT CHANGE UP
LIDOCAIN IGE QN: 22 U/L — SIGNIFICANT CHANGE UP (ref 7–60)
LYMPHOCYTES # BLD AUTO: 2.1 K/UL — SIGNIFICANT CHANGE UP (ref 1–3.3)
LYMPHOCYTES # BLD AUTO: 22.6 % — SIGNIFICANT CHANGE UP (ref 13–44)
MCHC RBC-ENTMCNC: 32.8 PG — SIGNIFICANT CHANGE UP (ref 27–34)
MCHC RBC-ENTMCNC: 35.1 GM/DL — SIGNIFICANT CHANGE UP (ref 32–36)
MCV RBC AUTO: 93.6 FL — SIGNIFICANT CHANGE UP (ref 80–100)
MONOCYTES # BLD AUTO: 0.6 K/UL — SIGNIFICANT CHANGE UP (ref 0–0.9)
MONOCYTES NFR BLD AUTO: 6.4 % — SIGNIFICANT CHANGE UP (ref 2–14)
NEUTROPHILS # BLD AUTO: 6.4 K/UL — SIGNIFICANT CHANGE UP (ref 1.8–7.4)
NEUTROPHILS NFR BLD AUTO: 69.7 % — SIGNIFICANT CHANGE UP (ref 43–77)
NITRITE UR-MCNC: NEGATIVE — SIGNIFICANT CHANGE UP
PH UR: 6.5 — SIGNIFICANT CHANGE UP (ref 5–8)
PLATELET # BLD AUTO: 267 K/UL — SIGNIFICANT CHANGE UP (ref 150–400)
POTASSIUM SERPL-MCNC: 4.1 MMOL/L — SIGNIFICANT CHANGE UP (ref 3.5–5.3)
POTASSIUM SERPL-SCNC: 4.1 MMOL/L — SIGNIFICANT CHANGE UP (ref 3.5–5.3)
PROT SERPL-MCNC: 6.8 G/DL — SIGNIFICANT CHANGE UP (ref 6–8.3)
PROT UR-MCNC: NEGATIVE — SIGNIFICANT CHANGE UP
RBC # BLD: 4.55 M/UL — SIGNIFICANT CHANGE UP (ref 3.8–5.2)
RBC # FLD: 12.1 % — SIGNIFICANT CHANGE UP (ref 10.3–14.5)
SODIUM SERPL-SCNC: 140 MMOL/L — SIGNIFICANT CHANGE UP (ref 135–145)
SP GR SPEC: 1.01 — LOW (ref 1.01–1.02)
TROPONIN T SERPL-MCNC: <0.01 NG/ML — SIGNIFICANT CHANGE UP (ref 0–0.06)
TSH SERPL-MCNC: 0.5 UIU/ML — SIGNIFICANT CHANGE UP (ref 0.27–4.2)
UROBILINOGEN FLD QL: NEGATIVE — SIGNIFICANT CHANGE UP
WBC # BLD: 9.3 K/UL — SIGNIFICANT CHANGE UP (ref 3.8–10.5)
WBC # FLD AUTO: 9.3 K/UL — SIGNIFICANT CHANGE UP (ref 3.8–10.5)

## 2018-01-31 PROCEDURE — 72141 MRI NECK SPINE W/O DYE: CPT | Mod: 26

## 2018-01-31 PROCEDURE — 99284 EMERGENCY DEPT VISIT MOD MDM: CPT | Mod: GC

## 2018-01-31 PROCEDURE — 71046 X-RAY EXAM CHEST 2 VIEWS: CPT | Mod: 26

## 2018-01-31 RX ORDER — ENOXAPARIN SODIUM 100 MG/ML
40 INJECTION SUBCUTANEOUS EVERY 24 HOURS
Qty: 0 | Refills: 0 | Status: DISCONTINUED | OUTPATIENT
Start: 2018-01-31 | End: 2018-02-04

## 2018-01-31 RX ORDER — ONDANSETRON 8 MG/1
4 TABLET, FILM COATED ORAL EVERY 6 HOURS
Qty: 0 | Refills: 0 | Status: DISCONTINUED | OUTPATIENT
Start: 2018-01-31 | End: 2018-02-05

## 2018-01-31 RX ORDER — ACETAMINOPHEN 500 MG
650 TABLET ORAL EVERY 6 HOURS
Qty: 0 | Refills: 0 | Status: DISCONTINUED | OUTPATIENT
Start: 2018-01-31 | End: 2018-02-05

## 2018-01-31 RX ORDER — CYCLOBENZAPRINE HYDROCHLORIDE 10 MG/1
10 TABLET, FILM COATED ORAL THREE TIMES A DAY
Qty: 0 | Refills: 0 | Status: DISCONTINUED | OUTPATIENT
Start: 2018-01-31 | End: 2018-02-05

## 2018-01-31 RX ORDER — INFLUENZA VIRUS VACCINE 15; 15; 15; 15 UG/.5ML; UG/.5ML; UG/.5ML; UG/.5ML
0.5 SUSPENSION INTRAMUSCULAR ONCE
Qty: 0 | Refills: 0 | Status: DISCONTINUED | OUTPATIENT
Start: 2018-01-31 | End: 2018-02-09

## 2018-01-31 RX ORDER — CLONAZEPAM 1 MG
1 TABLET ORAL AT BEDTIME
Qty: 0 | Refills: 0 | Status: DISCONTINUED | OUTPATIENT
Start: 2018-01-31 | End: 2018-02-01

## 2018-01-31 RX ORDER — SODIUM CHLORIDE 9 MG/ML
1000 INJECTION INTRAMUSCULAR; INTRAVENOUS; SUBCUTANEOUS ONCE
Qty: 0 | Refills: 0 | Status: COMPLETED | OUTPATIENT
Start: 2018-01-31 | End: 2018-01-31

## 2018-01-31 RX ORDER — SENNA PLUS 8.6 MG/1
2 TABLET ORAL AT BEDTIME
Qty: 0 | Refills: 0 | Status: DISCONTINUED | OUTPATIENT
Start: 2018-01-31 | End: 2018-02-05

## 2018-01-31 RX ORDER — DULOXETINE HYDROCHLORIDE 30 MG/1
30 CAPSULE, DELAYED RELEASE ORAL DAILY
Qty: 0 | Refills: 0 | Status: DISCONTINUED | OUTPATIENT
Start: 2018-01-31 | End: 2018-02-05

## 2018-01-31 RX ORDER — METOPROLOL TARTRATE 50 MG
12.5 TABLET ORAL AT BEDTIME
Qty: 0 | Refills: 0 | Status: DISCONTINUED | OUTPATIENT
Start: 2018-01-31 | End: 2018-02-05

## 2018-01-31 RX ORDER — ALBUTEROL 90 UG/1
2 AEROSOL, METERED ORAL
Qty: 0 | Refills: 0 | COMMUNITY

## 2018-01-31 RX ORDER — TOPIRAMATE 25 MG
100 TABLET ORAL
Qty: 0 | Refills: 0 | Status: DISCONTINUED | OUTPATIENT
Start: 2018-01-31 | End: 2018-02-05

## 2018-01-31 RX ORDER — HYDROMORPHONE HYDROCHLORIDE 2 MG/ML
2 INJECTION INTRAMUSCULAR; INTRAVENOUS; SUBCUTANEOUS EVERY 4 HOURS
Qty: 0 | Refills: 0 | Status: DISCONTINUED | OUTPATIENT
Start: 2018-01-31 | End: 2018-02-05

## 2018-01-31 RX ORDER — DOCUSATE SODIUM 100 MG
1 CAPSULE ORAL
Qty: 0 | Refills: 0 | COMMUNITY

## 2018-01-31 RX ORDER — METOPROLOL TARTRATE 50 MG
12.5 TABLET ORAL AT BEDTIME
Qty: 0 | Refills: 0 | Status: DISCONTINUED | OUTPATIENT
Start: 2018-01-31 | End: 2018-01-31

## 2018-01-31 RX ORDER — METOPROLOL TARTRATE 50 MG
12.5 TABLET ORAL
Qty: 0 | Refills: 0 | COMMUNITY

## 2018-01-31 RX ADMIN — Medication 650 MILLIGRAM(S): at 20:08

## 2018-01-31 RX ADMIN — Medication 100 MILLIGRAM(S): at 21:00

## 2018-01-31 RX ADMIN — Medication 12.5 MILLIGRAM(S): at 21:01

## 2018-01-31 RX ADMIN — Medication 1 MILLIGRAM(S): at 17:53

## 2018-01-31 RX ADMIN — DULOXETINE HYDROCHLORIDE 30 MILLIGRAM(S): 30 CAPSULE, DELAYED RELEASE ORAL at 21:00

## 2018-01-31 RX ADMIN — Medication 1 MILLIGRAM(S): at 21:58

## 2018-01-31 RX ADMIN — SODIUM CHLORIDE 2000 MILLILITER(S): 9 INJECTION INTRAMUSCULAR; INTRAVENOUS; SUBCUTANEOUS at 15:29

## 2018-01-31 NOTE — ED ADULT NURSE NOTE - OBJECTIVE STATEMENT
36yo F pt AxOx3 ambulatory  to ED c/o CP and syncope this am. Pt was seen at cardiologist and was sent here w/ EKG displaying SinusTach. Pt states she had negative cardiac workup but has family hx. PMHx chiari malformation. Pt denies SOB/N/V/D. Pt is flushed in appearance. NAD noted. Resp even, unlabored. EKG in triage. CM in place - NSR. No edema noted to extremities. Family at bedside. MDs at bedside for eval. Steady gait. 38yo F pt AxOx3 ambulatory  to ED c/o CP and syncope this am. Pt was seen at cardiologist and was sent here w/ EKG displaying SinusTach. Pt states she had negative cardiac workup but has family cardiac hx. PMHx Chiari I malformation. Pt denies SOB/N/V/D. Pt is flushed in appearance. NAD noted. Resp even, unlabored. EKG in triage. Gross Neuro intact. Steady gait. VILLARREAL equally. Pt denies dysphagia. CM in place - NSR. No edema noted to extremities. Family at bedside. #18G LAC, labs drawn and sent. MDs at bedside for eval. Steady gait.

## 2018-01-31 NOTE — ED PROVIDER NOTE - OBJECTIVE STATEMENT
38 yo with a known diagnosis of Su-Danlos and Arnold Chiari malformation presenting with chest pain. She was evaluated by a cardiologist today whoperformed and EKG and sent her to the ED to get evaluated for her chest pain and syncope. She had a syncopal event whiille going down the stairs a week ago. At thattime she went to [facility] where she was evaluated for trauma/fractures. She also had a negatice stress echo on 1/29. She had within the past year a negative coronary CT angiogram and a CT of the Chest showing emphysema. She is being followed up by Dr Murray for her Chiari malformation.

## 2018-01-31 NOTE — H&P ADULT - HISTORY OF PRESENT ILLNESS
p ([id number])     HPI: 38 yo with a known diagnosis of Su-Danlos and Arnold Chiari malformation presenting with chest pain. She was evaluated by a cardiologist today who performed and EKG and sent her to the ED to get evaluated for her chest pain and syncope. She had a syncopal event whiile going down the stairs a week ago. At that time she went to [facility] where she was evaluated for trauma/fractures. She also had a negative stress echo on . She had within the past year a negative coronary CT angiogram and a CT of the Chest showing emphysema. She is being followed up by Dr Murray for her Chiari malformation.    PAST MEDICAL HISTORY   Tethered cord syndrome  EDS (Su-Danlos syndrome)  Chiari I malformation  Irritable bowel syndrome without diarrhea  Cervical disc disease  Low back pain  Asthma    PAST SURGICAL HISTORY   S/P myelogram  History of tonsillectomy  History of cholecystectomy  H/O:   S/P lumbar laminectomy    Bee Stings (Anaphylaxis)  Keflex (Anaphylaxis)  latex (Rash)  penicillins (Anaphylaxis)      MEDICATIONS:  Antibiotics:    Neuro:  acetaminophen   Tablet. 650 milliGRAM(s) Oral every 6 hours PRN  ondansetron   Disintegrating Tablet 4 milliGRAM(s) Oral every 6 hours PRN    Anticoagulation:  enoxaparin Injectable 40 milliGRAM(s) SubCutaneous every 24 hours    Other:  bisacodyl 5 milliGRAM(s) Oral daily PRN  senna 2 Tablet(s) Oral at bedtime PRN  sodium chloride 0.9% Bolus 1000 milliLiter(s) IV Bolus once      SOCIAL HISTORY:   Occupation:   Marital Status:     FAMILY HISTORY:  Family history of coronary artery disease (Sibling)  Family history of hepatitis C  Family history of drug abuse      REVIEW OF SYSTEMS:  Check here if all are normal other than Neurological [x]  General:  Eyes:  ENT:  Cardiac:  Respiratory:  GI:  Musculoskeletal:   Skin:  Neurologic:   Psychiatric:     PHYSICAL EXAMINATION:   T(C): 36.8 (18 @ 13:27), Max: 36.8 (18 @ 12:17)  HR: 98 (18 @ 13:27) (98 - 112)  BP: 143/73 (18 @ 13:27) (136/93 - 143/73)  RR: 16 (18 @ 13:27) (15 - 16)  SpO2: 100% (18 @ 13:27) (100% - 100%)  Wt(kg): --    General Examination:     Neurologic Examination:           AOx3, FC, PERRL, EOMI, V1-3 intact, no facial, palate vitaliy symmetric, tongue midline, shrug 5/5  5/5 throughout, no drift  SILT  No clonus or babinski      LABS:                        14.9   9.3   )-----------( 267      ( 2018 13:56 )             42.6         140  |  107  |  11  ----------------------------<  93  4.1   |  24  |  0.75    Ca    9.3      2018 13:56    TPro  6.8  /  Alb  4.0  /  TBili  0.3  /  DBili  x   /  AST  14  /  ALT  12  /  AlkPhos  73        Urinalysis Basic - ( 2018 13:56 )    Color: PL Yellow / Appearance: Clear / S.006 / pH: x  Gluc: x / Ketone: Negative  / Bili: Negative / Urobili: Negative   Blood: x / Protein: Negative / Nitrite: Negative   Leuk Esterase: Negative / RBC: x / WBC x   Sq Epi: x / Non Sq Epi: x / Bacteria: x        RADIOLOGY & ADDITIONAL STUDIES: p ([id number])     HPI: 36 yo with a known diagnosis of Su-Danlos and Arnold Chiari malformation presenting with chest pain. She was evaluated by a cardiologist today who performed and EKG and sent her to the ED to get evaluated for her chest pain and syncope. She had a syncopal event whiile going down the stairs a week ago. At that time she went to [facility] where she was evaluated for trauma/fractures. She also had a negative stress echo on . She had within the past year a negative coronary CT angiogram and a CT of the Chest showing emphysema. She is being followed up by Dr Murray for her Chiari malformation.    PAST MEDICAL HISTORY   Tethered cord syndrome  EDS (Su-Danlos syndrome)  Chiari I malformation  Irritable bowel syndrome without diarrhea  Cervical disc disease  Low back pain  Asthma    PAST SURGICAL HISTORY   S/P myelogram  History of tonsillectomy  History of cholecystectomy  H/O:   S/P lumbar laminectomy    Bee Stings (Anaphylaxis)  Keflex (Anaphylaxis)  latex (Rash)  penicillins (Anaphylaxis)      MEDICATIONS:  Antibiotics:    Neuro:  acetaminophen   Tablet. 650 milliGRAM(s) Oral every 6 hours PRN  ondansetron   Disintegrating Tablet 4 milliGRAM(s) Oral every 6 hours PRN    Anticoagulation:  enoxaparin Injectable 40 milliGRAM(s) SubCutaneous every 24 hours    Other:  bisacodyl 5 milliGRAM(s) Oral daily PRN  senna 2 Tablet(s) Oral at bedtime PRN  sodium chloride 0.9% Bolus 1000 milliLiter(s) IV Bolus once      SOCIAL HISTORY:   Occupation:   Marital Status:     FAMILY HISTORY:  Family history of coronary artery disease (Sibling)  Family history of hepatitis C  Family history of drug abuse      REVIEW OF SYSTEMS:  Check here if all are normal other than Neurological [x]  General:  Eyes:  ENT:  Cardiac:  Respiratory:  GI:  Musculoskeletal:   Skin:  Neurologic:   Psychiatric:     PHYSICAL EXAMINATION:   T(C): 36.8 (18 @ 13:27), Max: 36.8 (18 @ 12:17)  HR: 98 (18 @ 13:27) (98 - 112)  BP: 143/73 (18 @ 13:27) (136/93 - 143/73)  RR: 16 (18 @ 13:27) (15 - 16)  SpO2: 100% (18 @ 13:27) (100% - 100%)  Wt(kg): --    General Examination:     Neurologic Examination:           AOx3, FC, PERRL, EOMI, V1-3 intact, no facial, palate vitaliy symmetric, tongue midline, shrug 5/5  4/5 BHF, otherwise 5/5 throughout, no drift  SILT  No clonus or babinski      LABS:                        14.9   9.3   )-----------( 267      ( 2018 13:56 )             42.6         140  |  107  |  11  ----------------------------<  93  4.1   |  24  |  0.75    Ca    9.3      2018 13:56    TPro  6.8  /  Alb  4.0  /  TBili  0.3  /  DBili  x   /  AST  14  /  ALT  12  /  AlkPhos  73        Urinalysis Basic - ( 2018 13:56 )    Color: PL Yellow / Appearance: Clear / S.006 / pH: x  Gluc: x / Ketone: Negative  / Bili: Negative / Urobili: Negative   Blood: x / Protein: Negative / Nitrite: Negative   Leuk Esterase: Negative / RBC: x / WBC x   Sq Epi: x / Non Sq Epi: x / Bacteria: x        RADIOLOGY & ADDITIONAL STUDIES:

## 2018-01-31 NOTE — ED ADULT NURSE REASSESSMENT NOTE - NS ED NURSE REASSESS COMMENT FT1
Pt returned from MRI. Pt is slightly anxious - MD at bedside for eval - pt treated w/ Ativan PO as ordered, tolerated well. Gross Neuro intact. PERRLA. NAD noted. VSS. Awaiting admission bed. Will continue to monitor.

## 2018-01-31 NOTE — ED ADULT NURSE REASSESSMENT NOTE - NS ED NURSE REASSESS COMMENT FT1
Report received from Arcelia LEVIN. RED. Pt. is resting, easy work of breathing. Pt. denies discomfort or pain at this time. Called the floor to give report but RN requested to call back 15 mins due to change of shift at this time.

## 2018-01-31 NOTE — H&P ADULT - ASSESSMENT
36 yo with a known diagnosis of Su-Danlos and Arnold Chiari malformation in ED after multiple episodes of syncope.  Negative cardiac workup.  MRI shows findings consistent with chiari malformation (7 mm below foramen magnum)    -admit to latefi  -pain control  -or per latefi 36 yo with a known diagnosis of Su-Danlos and Arnold Chiari malformation in ED after multiple episodes of syncope.  Negative cardiac workup.  MRI shows findings consistent with chiari malformation (7 mm below foramen magnum)    -admit to latefi  -pain control  -or per latefi  -cervical mri wo; flex ext neutral up through craniovertebral junction

## 2018-01-31 NOTE — ED PROVIDER NOTE - PROGRESS NOTE DETAILS
Called Dr Tapia the cadriologist who referred her to the ED. She said that she evaluated her syncope from a cardiac standpoint and does not think she has cardiac syncope. So she called her neurosurgeon Dr Murray who recommended admission for MRI of the brain and spine to rule ot a neurogenic cause of the syncope.

## 2018-01-31 NOTE — ED PROVIDER NOTE - MUSCULOSKELETAL, MLM
Spine appears normal has a scar of spinal surgery, range of motion is not limited, no muscle or joint tenderness

## 2018-01-31 NOTE — ED PROVIDER NOTE - ATTENDING CONTRIBUTION TO CARE
****ATTENDING**** 38yo female hx chiari malformation, orthostatic tachycardia presents with dizziness and syncope since thursday. She has had recurrent episodes with fall, was taken to [facility] and had CT head, cardiac work up w CT coronaries and stress echo. Today pt saw her cardiologist who sent her here for eval of possible arrhythmia and neurosurgery eval.   One exam, Patient is awake,alert,oriented x 3. Patient is well appearing and in no acute distress. Patient's chest is clear to ausculation, +s1s2. Abdomen is soft nd/nt +BS. Extremity with no swelling or calf tenderness.  EKG reviewed. Check labs, consult neurosurgery. IVF.

## 2018-01-31 NOTE — H&P ADULT - FAMILY HISTORY
Family history of drug abuse     Family history of hepatitis C     Sibling  Still living? Yes, Estimated age: 41  Family history of coronary artery disease, Age at diagnosis: Age Unknown

## 2018-01-31 NOTE — ED PROVIDER NOTE - MEDICAL DECISION MAKING DETAILS
see attending attestation see attending attestation. patient will be admitted under Dr Murray for MRI brain and spine.

## 2018-02-01 DIAGNOSIS — G93.5 COMPRESSION OF BRAIN: ICD-10-CM

## 2018-02-01 LAB — CRP SERPL-MCNC: <0.2 MG/DL — SIGNIFICANT CHANGE UP (ref 0–0.4)

## 2018-02-01 PROCEDURE — 99221 1ST HOSP IP/OBS SF/LOW 40: CPT

## 2018-02-01 RX ORDER — ALPRAZOLAM 0.25 MG
0.5 TABLET ORAL EVERY 8 HOURS
Qty: 0 | Refills: 0 | Status: DISCONTINUED | OUTPATIENT
Start: 2018-02-01 | End: 2018-02-04

## 2018-02-01 RX ADMIN — Medication 100 MILLIGRAM(S): at 17:36

## 2018-02-01 RX ADMIN — Medication 100 MILLIGRAM(S): at 05:08

## 2018-02-01 RX ADMIN — ENOXAPARIN SODIUM 40 MILLIGRAM(S): 100 INJECTION SUBCUTANEOUS at 05:08

## 2018-02-01 RX ADMIN — ENOXAPARIN SODIUM 40 MILLIGRAM(S): 100 INJECTION SUBCUTANEOUS at 17:36

## 2018-02-01 RX ADMIN — Medication 0.5 MILLIGRAM(S): at 16:42

## 2018-02-01 RX ADMIN — Medication 12.5 MILLIGRAM(S): at 21:18

## 2018-02-01 RX ADMIN — Medication 650 MILLIGRAM(S): at 11:21

## 2018-02-01 RX ADMIN — Medication 650 MILLIGRAM(S): at 11:51

## 2018-02-01 RX ADMIN — DULOXETINE HYDROCHLORIDE 30 MILLIGRAM(S): 30 CAPSULE, DELAYED RELEASE ORAL at 11:21

## 2018-02-01 RX ADMIN — CYCLOBENZAPRINE HYDROCHLORIDE 10 MILLIGRAM(S): 10 TABLET, FILM COATED ORAL at 17:36

## 2018-02-01 NOTE — CONSULT NOTE ADULT - SUBJECTIVE AND OBJECTIVE BOX
CHIEF COMPLAINT:Patient is a 37y old  Female who presents with a chief complaint of syncopal episodes (2018 14:33)      HISTORY OF PRESENT ILLNESS:  This is a pleasant woman with history as below , chiari malformation POTS admitted with syncope while standing up   no cp or sob   PAST MEDICAL & SURGICAL HISTORY:  Tethered cord syndrome  EDS (Su-Danlos syndrome)  Chiari I malformation  Irritable bowel syndrome without diarrhea: with constipation  Cervical disc disease  Low back pain: chronic x 2 1/2 years  Asthma: no recent exacerbations  S/P myelogram: c/b CSF leak, spinal headache, S/P blood patch  History of tonsillectomy: age 18  History of cholecystectomy: laparoscopic   H/O: :   S/P lumbar laminectomy: , complicated with CSF leak, S/P blood patch          MEDICATIONS:  enoxaparin Injectable 40 milliGRAM(s) SubCutaneous every 24 hours  metoprolol     tartrate 12.5 milliGRAM(s) Oral at bedtime        acetaminophen   Tablet. 650 milliGRAM(s) Oral every 6 hours PRN  clonazePAM Tablet 1 milliGRAM(s) Oral at bedtime PRN  cyclobenzaprine 10 milliGRAM(s) Oral three times a day PRN  DULoxetine 30 milliGRAM(s) Oral daily  HYDROmorphone   Tablet 2 milliGRAM(s) Oral every 4 hours PRN  ondansetron   Disintegrating Tablet 4 milliGRAM(s) Oral every 6 hours PRN  topiramate 100 milliGRAM(s) Oral two times a day    bisacodyl 5 milliGRAM(s) Oral daily PRN  senna 2 Tablet(s) Oral at bedtime PRN      influenza   Vaccine 0.5 milliLiter(s) IntraMuscular once      FAMILY HISTORY:  Family history of coronary artery disease (Sibling): sister has 4 stents dx age 38  Family history of hepatitis C  Family history of drug abuse      Non-contributory    SOCIAL HISTORY:    No tobacco, drugs or etoh    Allergies    Bee Stings (Anaphylaxis)  Keflex (Anaphylaxis)  latex (Rash)  penicillins (Anaphylaxis)    Intolerances    	    REVIEW OF SYSTEMS:  as above  The rest of the 14 points ROS reviewed and except above they are unremarkable.        PHYSICAL EXAM:  T(C): 36.8 (18 @ 07:39), Max: 37 (18 @ 19:25)  HR: 82 (18 @ 07:39) (77 - 112)  BP: 96/66 (18 @ 07:39) (96/66 - 143/73)  RR: 18 (18 @ 07:39) (15 - 18)  SpO2: 98% (18 @ 07:39) (98% - 100%)  Wt(kg): --  I&O's Summary    2018 07:01  -  2018 07:00  --------------------------------------------------------  IN: 480 mL / OUT: 0 mL / NET: 480 mL        Appearance: Normal	  HEENT:   Normal oral mucosa, PERRL, EOMI	  Cardiovascular: Normal S1 S2,    Murmur:   Neck: JVP normal  Respiratory: Lungs clear to auscultation  Gastrointestinal:  Soft, Non-tender, + BS	  Skin: normal   Neuro: No gross deficits.   Psychiatry:  Mood & affect appropriate  Ext: No edema    LABS/DATA:    TELEMETRY: 	    ECG:  	NSR   	  CARDIAC MARKERS:  Troponin T, Serum: <0.01 ng/mL ( @ 13:56)                                  14.9   9.3   )-----------( 267      ( 2018 13:56 )             42.6         140  |  107  |  11  ----------------------------<  93  4.1   |  24  |  0.75    Ca    9.3      2018 13:56    TPro  6.8  /  Alb  4.0  /  TBili  0.3  /  DBili  x   /  AST  14  /  ALT  12  /  AlkPhos  73      proBNP:   Lipid Profile:   HgA1c:   TSH: Thyroid Stimulating Hormone, Serum: 0.50 uIU/mL ( @ 16:49)

## 2018-02-01 NOTE — PROGRESS NOTE ADULT - ASSESSMENT
HPI:  p ([id number])     HPI: 36 yo with a known diagnosis of Su-Danlos and Arnold Chiari malformation presenting with chest pain. She was evaluated by a cardiologist today who performed and EKG and sent her to the ED to get evaluated for her chest pain and syncope. She had a syncopal event whiile going down the stairs a week ago. At that time she went to [facility] where she was evaluated for trauma/fractures. She also had a negative stress echo on 1/29. She had within the past year a negative coronary CT angiogram and a CT of the Chest showing emphysema. She is being followed up by Dr Murray for her Chiari malformation.    36 yo female admitted with neck and back pain.

## 2018-02-02 PROCEDURE — 93306 TTE W/DOPPLER COMPLETE: CPT | Mod: 26

## 2018-02-02 RX ADMIN — Medication 0.5 MILLIGRAM(S): at 13:08

## 2018-02-02 RX ADMIN — Medication 12.5 MILLIGRAM(S): at 21:57

## 2018-02-02 RX ADMIN — ENOXAPARIN SODIUM 40 MILLIGRAM(S): 100 INJECTION SUBCUTANEOUS at 17:28

## 2018-02-02 RX ADMIN — Medication 100 MILLIGRAM(S): at 17:28

## 2018-02-02 RX ADMIN — Medication 100 MILLIGRAM(S): at 05:06

## 2018-02-02 RX ADMIN — DULOXETINE HYDROCHLORIDE 30 MILLIGRAM(S): 30 CAPSULE, DELAYED RELEASE ORAL at 12:39

## 2018-02-02 RX ADMIN — Medication 0.5 MILLIGRAM(S): at 21:57

## 2018-02-02 NOTE — PROGRESS NOTE ADULT - ASSESSMENT
36 yo with a known diagnosis of Su-Danlos and Arnold Chiari malformation presenting with chest pain. She was evaluated by a cardiologist today who performed an EKG and sent her to the ED to get evaluated for her chest pain and syncope. She had a syncopal event whiile going down the stairs a week ago. At that time she went to [facility] where she was evaluated for trauma/fractures. She also had a negative stress echo on . She had within the past year a negative coronary CT angiogram and a CT of the Chest showing emphysema. She is being followed up by Dr Murray for her Chiari malformation.    PAST MEDICAL HISTORY   Tethered cord syndrome  EDS (Su-Danlos syndrome)  Chiari I malformation  Irritable bowel syndrome without diarrhea  Cervical disc disease  Low back pain  Asthma    PAST SURGICAL HISTORY   S/P myelogram  History of tonsillectomy  History of cholecystectomy  H/O:   S/P lumbar laminectomy    Bee Stings (Anaphylaxis)  Keflex (Anaphylaxis)  latex (Rash)  penicillins (Anaphylaxis)        PLAN:  Neuro:   - plan for posterior fossa decompression  on 18   - pain control- continue dilaudid prn, cyclobenzaprine prn spasm  - anxiety- continue duloxetine, xanax prn  Respiratory:   - encouraged incentive spirometry  CV:  - continue metoprolol   DVT ppx:   - venodynes, chemoprophylaxis  PT/OT:  TBD after surgery  [id number]

## 2018-02-03 RX ORDER — FAMOTIDINE 10 MG/ML
20 INJECTION INTRAVENOUS DAILY
Qty: 0 | Refills: 0 | Status: DISCONTINUED | OUTPATIENT
Start: 2018-02-03 | End: 2018-02-05

## 2018-02-03 RX ADMIN — Medication 100 MILLIGRAM(S): at 05:57

## 2018-02-03 RX ADMIN — Medication 100 MILLIGRAM(S): at 21:43

## 2018-02-03 RX ADMIN — Medication 12.5 MILLIGRAM(S): at 21:43

## 2018-02-03 RX ADMIN — HYDROMORPHONE HYDROCHLORIDE 2 MILLIGRAM(S): 2 INJECTION INTRAMUSCULAR; INTRAVENOUS; SUBCUTANEOUS at 09:45

## 2018-02-03 RX ADMIN — SENNA PLUS 2 TABLET(S): 8.6 TABLET ORAL at 21:43

## 2018-02-03 RX ADMIN — Medication 0.5 MILLIGRAM(S): at 10:35

## 2018-02-03 RX ADMIN — HYDROMORPHONE HYDROCHLORIDE 2 MILLIGRAM(S): 2 INJECTION INTRAMUSCULAR; INTRAVENOUS; SUBCUTANEOUS at 08:54

## 2018-02-03 RX ADMIN — Medication 5 MILLIGRAM(S): at 09:39

## 2018-02-03 RX ADMIN — FAMOTIDINE 20 MILLIGRAM(S): 10 INJECTION INTRAVENOUS at 09:39

## 2018-02-03 RX ADMIN — Medication 0.5 MILLIGRAM(S): at 21:44

## 2018-02-03 RX ADMIN — DULOXETINE HYDROCHLORIDE 30 MILLIGRAM(S): 30 CAPSULE, DELAYED RELEASE ORAL at 09:39

## 2018-02-03 RX ADMIN — ENOXAPARIN SODIUM 40 MILLIGRAM(S): 100 INJECTION SUBCUTANEOUS at 21:44

## 2018-02-03 NOTE — PROGRESS NOTE ADULT - ASSESSMENT
HPI: 36 yo with a known diagnosis of Su-Danlos and Arnold Chiari malformation presenting with chest pain. She was evaluated by a cardiologist today who performed and EKG and sent her to the ED to get evaluated for her chest pain and syncope. She had a syncopal event whiile going down the stairs a week ago. At that time she went to [facility] where she was evaluated for trauma/fractures. She also had a negative stress echo on 1/29. She had within the past year a negative coronary CT angiogram and a CT of the Chest showing emphysema. She is being followed up by Dr Murray for her Chiari malformation.    PLAN:  -Neuro: Pre op  for posterior fossa decompression  on Monday. Pre op labs and pregnancy test ordered for tomorrow morning.   -Dilaudid for pain control. Flexeril for muscle spams.   -Encouraged incentive spirometry.   -Metoprolol for hypertension.   -Continue duloxetine, xanax for anxiety.  -DVT ppx: venodynes and sql (dc sunday in prep for surgery monday)  -Dulcolax and senna for bowel regimen     Will [facility] above [id number] HPI: 38 yo with a known diagnosis of Su-Danlos and Arnold Chiari malformation presenting with chest pain. She was evaluated by a cardiologist today who performed and EKG and sent her to the ED to get evaluated for her chest pain and syncope. She had a syncopal event whiile going down the stairs a week ago. At that time she went to [facility] where she was evaluated for trauma/fractures. She also had a negative stress echo on 1/29. She had within the past year a negative coronary CT angiogram and a CT of the Chest showing emphysema. She is being followed up by Dr Murray for her Chiari malformation.    PLAN:  -Neuro: Pre op  for posterior fossa decompression  on Monday. Pre op labs and pregnancy test ordered for tomorrow morning.   -Dilaudid for pain control. Flexeril for muscle spams.   -Pt had orthostatic bp few days ago which seem to have improved with normal orthostatic vitals. TTE from 2/2 was wnl with EF 60%  -Encouraged incentive spirometry.   -Metoprolol for hypertension.   -Continue duloxetine, xanax for anxiety.  -DVT ppx: venodynes and sql (dc sunday in prep for surgery monday)  -Dulcolax and senna for bowel regimen     Will [facility] above [facility] Murray[id number]

## 2018-02-03 NOTE — PROGRESS NOTE ADULT - ASSESSMENT
SYNCOPE  POTS    normal orthostatic vitals   cont current meds  normal echo     PreOp  Based on current ACC/AHA guidelines, patient history and physical exam, the patient is considered to have low risk  no objection to proceed to OR

## 2018-02-04 LAB
ANION GAP SERPL CALC-SCNC: 14 MMOL/L — SIGNIFICANT CHANGE UP (ref 5–17)
APTT BLD: 34.4 SEC — SIGNIFICANT CHANGE UP (ref 27.5–37.4)
BLD GP AB SCN SERPL QL: NEGATIVE — SIGNIFICANT CHANGE UP
BUN SERPL-MCNC: 14 MG/DL — SIGNIFICANT CHANGE UP (ref 7–23)
CALCIUM SERPL-MCNC: 9.3 MG/DL — SIGNIFICANT CHANGE UP (ref 8.4–10.5)
CHLORIDE SERPL-SCNC: 107 MMOL/L — SIGNIFICANT CHANGE UP (ref 96–108)
CO2 SERPL-SCNC: 19 MMOL/L — LOW (ref 22–31)
CREAT SERPL-MCNC: 0.71 MG/DL — SIGNIFICANT CHANGE UP (ref 0.5–1.3)
GLUCOSE SERPL-MCNC: 103 MG/DL — HIGH (ref 70–99)
HCG SERPL-ACNC: <1 MIU/ML — SIGNIFICANT CHANGE UP
HCT VFR BLD CALC: 44.3 % — SIGNIFICANT CHANGE UP (ref 34.5–45)
HGB BLD-MCNC: 14.7 G/DL — SIGNIFICANT CHANGE UP (ref 11.5–15.5)
INR BLD: 0.91 RATIO — SIGNIFICANT CHANGE UP (ref 0.88–1.16)
MCHC RBC-ENTMCNC: 30.6 PG — SIGNIFICANT CHANGE UP (ref 27–34)
MCHC RBC-ENTMCNC: 33.2 GM/DL — SIGNIFICANT CHANGE UP (ref 32–36)
MCV RBC AUTO: 92.1 FL — SIGNIFICANT CHANGE UP (ref 80–100)
PLATELET # BLD AUTO: 265 K/UL — SIGNIFICANT CHANGE UP (ref 150–400)
POTASSIUM SERPL-MCNC: 4.3 MMOL/L — SIGNIFICANT CHANGE UP (ref 3.5–5.3)
POTASSIUM SERPL-SCNC: 4.3 MMOL/L — SIGNIFICANT CHANGE UP (ref 3.5–5.3)
PROTHROM AB SERPL-ACNC: 10.3 SEC — SIGNIFICANT CHANGE UP (ref 10–13.1)
RBC # BLD: 4.81 M/UL — SIGNIFICANT CHANGE UP (ref 3.8–5.2)
RBC # FLD: 13.8 % — SIGNIFICANT CHANGE UP (ref 10.3–14.5)
RH IG SCN BLD-IMP: POSITIVE — SIGNIFICANT CHANGE UP
SODIUM SERPL-SCNC: 140 MMOL/L — SIGNIFICANT CHANGE UP (ref 135–145)
WBC # BLD: 8.84 K/UL — SIGNIFICANT CHANGE UP (ref 3.8–10.5)
WBC # FLD AUTO: 8.84 K/UL — SIGNIFICANT CHANGE UP (ref 3.8–10.5)

## 2018-02-04 RX ORDER — DEXTROSE MONOHYDRATE, SODIUM CHLORIDE, AND POTASSIUM CHLORIDE 50; .745; 4.5 G/1000ML; G/1000ML; G/1000ML
1000 INJECTION, SOLUTION INTRAVENOUS
Qty: 0 | Refills: 0 | Status: DISCONTINUED | OUTPATIENT
Start: 2018-02-04 | End: 2018-02-05

## 2018-02-04 RX ORDER — ALPRAZOLAM 0.25 MG
1 TABLET ORAL EVERY 8 HOURS
Qty: 0 | Refills: 0 | Status: DISCONTINUED | OUTPATIENT
Start: 2018-02-04 | End: 2018-02-05

## 2018-02-04 RX ORDER — CALCIUM CARBONATE 500(1250)
1 TABLET ORAL THREE TIMES A DAY
Qty: 0 | Refills: 0 | Status: DISCONTINUED | OUTPATIENT
Start: 2018-02-04 | End: 2018-02-05

## 2018-02-04 RX ADMIN — Medication 100 MILLIGRAM(S): at 15:57

## 2018-02-04 RX ADMIN — Medication 1 TABLET(S): at 23:00

## 2018-02-04 RX ADMIN — Medication 12.5 MILLIGRAM(S): at 21:42

## 2018-02-04 RX ADMIN — Medication 0.5 MILLIGRAM(S): at 09:16

## 2018-02-04 RX ADMIN — Medication 100 MILLIGRAM(S): at 06:17

## 2018-02-04 RX ADMIN — FAMOTIDINE 20 MILLIGRAM(S): 10 INJECTION INTRAVENOUS at 11:25

## 2018-02-04 RX ADMIN — DULOXETINE HYDROCHLORIDE 30 MILLIGRAM(S): 30 CAPSULE, DELAYED RELEASE ORAL at 11:25

## 2018-02-04 RX ADMIN — HYDROMORPHONE HYDROCHLORIDE 2 MILLIGRAM(S): 2 INJECTION INTRAMUSCULAR; INTRAVENOUS; SUBCUTANEOUS at 21:46

## 2018-02-04 RX ADMIN — ONDANSETRON 4 MILLIGRAM(S): 8 TABLET, FILM COATED ORAL at 22:44

## 2018-02-04 RX ADMIN — Medication 1 MILLIGRAM(S): at 15:57

## 2018-02-04 RX ADMIN — Medication 10 MILLIGRAM(S): at 12:24

## 2018-02-04 NOTE — PROGRESS NOTE ADULT - ASSESSMENT
HPI: 38 yo with a known diagnosis of Su-Danlos and Arnold Chiari malformation presenting with chest pain. She was evaluated by a cardiologist today who performed and EKG and sent her to the ED to get evaluated for her chest pain and syncope. She had a syncopal event whiile going down the stairs a week ago. At that time she went to [facility] where she was evaluated for trauma/fractures. She also had a negative stress echo on 1/29. She had within the past year a negative coronary CT angiogram and a CT of the Chest showing emphysema. She is being followed up by Dr Murray for her Chiari malformation.    PLAN:  -Neuro: Pre op  for posterior fossa decompression tomorrow. Pre-op labs reviewed, WNL.    -Dilaudid for pain control. Flexeril for muscle spams.   -Pt had orthostatic bp few days ago which seem to have improved with normal orthostatic vitals. TTE from 2/2 was wnl with EF 60%  -Encouraged incentive spirometry.   -Metoprolol for hypertension.   -Continue duloxetine, xanax for anxiety.  -DVT ppx: venodynes, SQL DC'd   -Dulcolax and senna for bowel regimen   -Consent in Chart   -Medically cleared for OR  - NPO at [place]

## 2018-02-05 ENCOUNTER — TRANSCRIPTION ENCOUNTER (OUTPATIENT)
Age: 38
End: 2018-02-05

## 2018-02-05 ENCOUNTER — APPOINTMENT (OUTPATIENT)
Dept: SPINE | Facility: HOSPITAL | Age: 38
End: 2018-02-05

## 2018-02-05 LAB
ANION GAP SERPL CALC-SCNC: 9 MMOL/L — SIGNIFICANT CHANGE UP (ref 5–17)
BUN SERPL-MCNC: 11 MG/DL — SIGNIFICANT CHANGE UP (ref 7–23)
CALCIUM SERPL-MCNC: 7.5 MG/DL — LOW (ref 8.4–10.5)
CHLORIDE SERPL-SCNC: 110 MMOL/L — HIGH (ref 96–108)
CO2 SERPL-SCNC: 17 MMOL/L — LOW (ref 22–31)
CREAT SERPL-MCNC: 0.58 MG/DL — SIGNIFICANT CHANGE UP (ref 0.5–1.3)
GLUCOSE SERPL-MCNC: 137 MG/DL — HIGH (ref 70–99)
HCT VFR BLD CALC: 34.7 % — SIGNIFICANT CHANGE UP (ref 34.5–45)
HGB BLD-MCNC: 12.1 G/DL — SIGNIFICANT CHANGE UP (ref 11.5–15.5)
MAGNESIUM SERPL-MCNC: 1.8 MG/DL — SIGNIFICANT CHANGE UP (ref 1.6–2.6)
MCHC RBC-ENTMCNC: 32.7 PG — SIGNIFICANT CHANGE UP (ref 27–34)
MCHC RBC-ENTMCNC: 34.8 GM/DL — SIGNIFICANT CHANGE UP (ref 32–36)
MCV RBC AUTO: 94.1 FL — SIGNIFICANT CHANGE UP (ref 80–100)
PHOSPHATE SERPL-MCNC: 3.6 MG/DL — SIGNIFICANT CHANGE UP (ref 2.5–4.5)
PLATELET # BLD AUTO: 210 K/UL — SIGNIFICANT CHANGE UP (ref 150–400)
POTASSIUM SERPL-MCNC: 3.7 MMOL/L — SIGNIFICANT CHANGE UP (ref 3.5–5.3)
POTASSIUM SERPL-SCNC: 3.7 MMOL/L — SIGNIFICANT CHANGE UP (ref 3.5–5.3)
RBC # BLD: 3.68 M/UL — LOW (ref 3.8–5.2)
RBC # FLD: 12 % — SIGNIFICANT CHANGE UP (ref 10.3–14.5)
SODIUM SERPL-SCNC: 136 MMOL/L — SIGNIFICANT CHANGE UP (ref 135–145)
WBC # BLD: 9.6 K/UL — SIGNIFICANT CHANGE UP (ref 3.8–10.5)
WBC # FLD AUTO: 9.6 K/UL — SIGNIFICANT CHANGE UP (ref 3.8–10.5)

## 2018-02-05 PROCEDURE — 61783 SCAN PROC SPINAL: CPT

## 2018-02-05 PROCEDURE — 22590 ARTHRD PST TQ CRANIOCERVICAL: CPT | Mod: 80

## 2018-02-05 PROCEDURE — 61343 CRNEC SOPL CRV LAM DCMPRN: CPT | Mod: 62

## 2018-02-05 PROCEDURE — 22840 INSERT SPINE FIXATION DEVICE: CPT

## 2018-02-05 PROCEDURE — 22590 ARTHRD PST TQ CRANIOCERVICAL: CPT | Mod: 62

## 2018-02-05 RX ORDER — ONDANSETRON 8 MG/1
4 TABLET, FILM COATED ORAL ONCE
Qty: 0 | Refills: 0 | Status: DISCONTINUED | OUTPATIENT
Start: 2018-02-05 | End: 2018-02-06

## 2018-02-05 RX ORDER — NALOXONE HYDROCHLORIDE 4 MG/.1ML
0.1 SPRAY NASAL
Qty: 0 | Refills: 0 | Status: DISCONTINUED | OUTPATIENT
Start: 2018-02-05 | End: 2018-02-07

## 2018-02-05 RX ORDER — DEXTROSE MONOHYDRATE, SODIUM CHLORIDE, AND POTASSIUM CHLORIDE 50; .745; 4.5 G/1000ML; G/1000ML; G/1000ML
1000 INJECTION, SOLUTION INTRAVENOUS
Qty: 0 | Refills: 0 | Status: DISCONTINUED | OUTPATIENT
Start: 2018-02-05 | End: 2018-02-08

## 2018-02-05 RX ORDER — HYDROMORPHONE HYDROCHLORIDE 2 MG/ML
0.5 INJECTION INTRAMUSCULAR; INTRAVENOUS; SUBCUTANEOUS
Qty: 0 | Refills: 0 | Status: DISCONTINUED | OUTPATIENT
Start: 2018-02-05 | End: 2018-02-07

## 2018-02-05 RX ORDER — HYDROMORPHONE HYDROCHLORIDE 2 MG/ML
30 INJECTION INTRAMUSCULAR; INTRAVENOUS; SUBCUTANEOUS
Qty: 0 | Refills: 0 | Status: DISCONTINUED | OUTPATIENT
Start: 2018-02-05 | End: 2018-02-07

## 2018-02-05 RX ORDER — ONDANSETRON 8 MG/1
4 TABLET, FILM COATED ORAL EVERY 4 HOURS
Qty: 0 | Refills: 0 | Status: DISCONTINUED | OUTPATIENT
Start: 2018-02-05 | End: 2018-02-06

## 2018-02-05 RX ORDER — DIAZEPAM 5 MG
5 TABLET ORAL EVERY 8 HOURS
Qty: 0 | Refills: 0 | Status: DISCONTINUED | OUTPATIENT
Start: 2018-02-05 | End: 2018-02-08

## 2018-02-05 RX ORDER — DOCUSATE SODIUM 100 MG
100 CAPSULE ORAL THREE TIMES A DAY
Qty: 0 | Refills: 0 | Status: DISCONTINUED | OUTPATIENT
Start: 2018-02-05 | End: 2018-02-09

## 2018-02-05 RX ORDER — ONDANSETRON 8 MG/1
4 TABLET, FILM COATED ORAL EVERY 6 HOURS
Qty: 0 | Refills: 0 | Status: DISCONTINUED | OUTPATIENT
Start: 2018-02-05 | End: 2018-02-07

## 2018-02-05 RX ORDER — SENNA PLUS 8.6 MG/1
2 TABLET ORAL AT BEDTIME
Qty: 0 | Refills: 0 | Status: DISCONTINUED | OUTPATIENT
Start: 2018-02-05 | End: 2018-02-09

## 2018-02-05 RX ORDER — FAMOTIDINE 10 MG/ML
20 INJECTION INTRAVENOUS EVERY 12 HOURS
Qty: 0 | Refills: 0 | Status: DISCONTINUED | OUTPATIENT
Start: 2018-02-05 | End: 2018-02-09

## 2018-02-05 RX ORDER — ENOXAPARIN SODIUM 100 MG/ML
40 INJECTION SUBCUTANEOUS AT BEDTIME
Qty: 0 | Refills: 0 | Status: DISCONTINUED | OUTPATIENT
Start: 2018-02-06 | End: 2018-02-06

## 2018-02-05 RX ORDER — VANCOMYCIN HCL 1 G
1000 VIAL (EA) INTRAVENOUS EVERY 12 HOURS
Qty: 0 | Refills: 0 | Status: COMPLETED | OUTPATIENT
Start: 2018-02-05 | End: 2018-02-07

## 2018-02-05 RX ADMIN — FAMOTIDINE 20 MILLIGRAM(S): 10 INJECTION INTRAVENOUS at 11:09

## 2018-02-05 RX ADMIN — HYDROMORPHONE HYDROCHLORIDE 2 MILLIGRAM(S): 2 INJECTION INTRAMUSCULAR; INTRAVENOUS; SUBCUTANEOUS at 11:08

## 2018-02-05 RX ADMIN — Medication 1 MILLIGRAM(S): at 00:11

## 2018-02-05 RX ADMIN — HYDROMORPHONE HYDROCHLORIDE 30 MILLILITER(S): 2 INJECTION INTRAMUSCULAR; INTRAVENOUS; SUBCUTANEOUS at 23:00

## 2018-02-05 RX ADMIN — HYDROMORPHONE HYDROCHLORIDE 0.5 MILLIGRAM(S): 2 INJECTION INTRAMUSCULAR; INTRAVENOUS; SUBCUTANEOUS at 22:15

## 2018-02-05 RX ADMIN — DULOXETINE HYDROCHLORIDE 30 MILLIGRAM(S): 30 CAPSULE, DELAYED RELEASE ORAL at 11:08

## 2018-02-05 RX ADMIN — DEXTROSE MONOHYDRATE, SODIUM CHLORIDE, AND POTASSIUM CHLORIDE 80 MILLILITER(S): 50; .745; 4.5 INJECTION, SOLUTION INTRAVENOUS at 22:00

## 2018-02-05 RX ADMIN — Medication 1 MILLIGRAM(S): at 08:33

## 2018-02-05 RX ADMIN — Medication 100 MILLIGRAM(S): at 05:10

## 2018-02-05 RX ADMIN — HYDROMORPHONE HYDROCHLORIDE 0.5 MILLIGRAM(S): 2 INJECTION INTRAMUSCULAR; INTRAVENOUS; SUBCUTANEOUS at 22:30

## 2018-02-05 RX ADMIN — HYDROMORPHONE HYDROCHLORIDE 2 MILLIGRAM(S): 2 INJECTION INTRAMUSCULAR; INTRAVENOUS; SUBCUTANEOUS at 11:39

## 2018-02-06 ENCOUNTER — TRANSCRIPTION ENCOUNTER (OUTPATIENT)
Age: 38
End: 2018-02-06

## 2018-02-06 LAB
ANION GAP SERPL CALC-SCNC: 6 MMOL/L — SIGNIFICANT CHANGE UP (ref 5–17)
APTT BLD: 30.1 SEC — SIGNIFICANT CHANGE UP (ref 27.5–37.4)
BLD GP AB SCN SERPL QL: NEGATIVE — SIGNIFICANT CHANGE UP
BUN SERPL-MCNC: 12 MG/DL — SIGNIFICANT CHANGE UP (ref 7–23)
CA-I FLD-SCNC: 1.18 MMOL/L — SIGNIFICANT CHANGE UP
CALCIUM IONIZED, FLUID: 1.18 MMOL/L — SIGNIFICANT CHANGE UP
CALCIUM SERPL-MCNC: 8.2 MG/DL — LOW (ref 8.4–10.5)
CHLORIDE SERPL-SCNC: 109 MMOL/L — HIGH (ref 96–108)
CO2 SERPL-SCNC: 24 MMOL/L — SIGNIFICANT CHANGE UP (ref 22–31)
CREAT SERPL-MCNC: 0.51 MG/DL — SIGNIFICANT CHANGE UP (ref 0.5–1.3)
GLUCOSE SERPL-MCNC: 123 MG/DL — HIGH (ref 70–99)
HCG SERPL-ACNC: <2 MIU/ML — LOW (ref 5–24)
HCT VFR BLD CALC: 34 % — LOW (ref 34.5–45)
HGB BLD-MCNC: 11.8 G/DL — SIGNIFICANT CHANGE UP (ref 11.5–15.5)
INR BLD: 1.06 RATIO — SIGNIFICANT CHANGE UP (ref 0.88–1.16)
MAGNESIUM SERPL-MCNC: 1.8 MG/DL — SIGNIFICANT CHANGE UP (ref 1.6–2.6)
MCHC RBC-ENTMCNC: 33.3 PG — SIGNIFICANT CHANGE UP (ref 27–34)
MCHC RBC-ENTMCNC: 34.8 GM/DL — SIGNIFICANT CHANGE UP (ref 32–36)
MCV RBC AUTO: 95.7 FL — SIGNIFICANT CHANGE UP (ref 80–100)
PHOSPHATE SERPL-MCNC: 2.5 MG/DL — SIGNIFICANT CHANGE UP (ref 2.5–4.5)
PLATELET # BLD AUTO: 186 K/UL — SIGNIFICANT CHANGE UP (ref 150–400)
POTASSIUM SERPL-MCNC: 4.1 MMOL/L — SIGNIFICANT CHANGE UP (ref 3.5–5.3)
POTASSIUM SERPL-SCNC: 4.1 MMOL/L — SIGNIFICANT CHANGE UP (ref 3.5–5.3)
PROTHROM AB SERPL-ACNC: 11.6 SEC — SIGNIFICANT CHANGE UP (ref 9.8–12.7)
RBC # BLD: 3.55 M/UL — LOW (ref 3.8–5.2)
RBC # FLD: 11.9 % — SIGNIFICANT CHANGE UP (ref 10.3–14.5)
RH IG SCN BLD-IMP: POSITIVE — SIGNIFICANT CHANGE UP
SODIUM SERPL-SCNC: 139 MMOL/L — SIGNIFICANT CHANGE UP (ref 135–145)
WBC # BLD: 11.5 K/UL — HIGH (ref 3.8–10.5)
WBC # FLD AUTO: 11.5 K/UL — HIGH (ref 3.8–10.5)

## 2018-02-06 PROCEDURE — 72125 CT NECK SPINE W/O DYE: CPT | Mod: 26

## 2018-02-06 RX ORDER — DULOXETINE HYDROCHLORIDE 30 MG/1
30 CAPSULE, DELAYED RELEASE ORAL DAILY
Qty: 0 | Refills: 0 | Status: DISCONTINUED | OUTPATIENT
Start: 2018-02-06 | End: 2018-02-09

## 2018-02-06 RX ORDER — ALPRAZOLAM 0.25 MG
0.25 TABLET ORAL ONCE
Qty: 0 | Refills: 0 | Status: DISCONTINUED | OUTPATIENT
Start: 2018-02-06 | End: 2018-02-06

## 2018-02-06 RX ORDER — ACETAMINOPHEN 500 MG
650 TABLET ORAL EVERY 6 HOURS
Qty: 0 | Refills: 0 | Status: DISCONTINUED | OUTPATIENT
Start: 2018-02-06 | End: 2018-02-09

## 2018-02-06 RX ORDER — TOPIRAMATE 25 MG
100 TABLET ORAL EVERY 12 HOURS
Qty: 0 | Refills: 0 | Status: DISCONTINUED | OUTPATIENT
Start: 2018-02-06 | End: 2018-02-09

## 2018-02-06 RX ORDER — MAGNESIUM SULFATE 500 MG/ML
2 VIAL (ML) INJECTION ONCE
Qty: 0 | Refills: 0 | Status: COMPLETED | OUTPATIENT
Start: 2018-02-06 | End: 2018-02-07

## 2018-02-06 RX ADMIN — HYDROMORPHONE HYDROCHLORIDE 30 MILLILITER(S): 2 INJECTION INTRAMUSCULAR; INTRAVENOUS; SUBCUTANEOUS at 07:43

## 2018-02-06 RX ADMIN — HYDROMORPHONE HYDROCHLORIDE 30 MILLILITER(S): 2 INJECTION INTRAMUSCULAR; INTRAVENOUS; SUBCUTANEOUS at 22:52

## 2018-02-06 RX ADMIN — Medication 100 MILLIGRAM(S): at 18:13

## 2018-02-06 RX ADMIN — DULOXETINE HYDROCHLORIDE 30 MILLIGRAM(S): 30 CAPSULE, DELAYED RELEASE ORAL at 14:55

## 2018-02-06 RX ADMIN — Medication 250 MILLIGRAM(S): at 14:57

## 2018-02-06 RX ADMIN — Medication 650 MILLIGRAM(S): at 14:00

## 2018-02-06 RX ADMIN — Medication 650 MILLIGRAM(S): at 14:45

## 2018-02-06 RX ADMIN — Medication 5 MILLIGRAM(S): at 11:16

## 2018-02-06 RX ADMIN — Medication 100 MILLIGRAM(S): at 05:40

## 2018-02-06 RX ADMIN — Medication 5 MILLIGRAM(S): at 18:13

## 2018-02-06 RX ADMIN — Medication 250 MILLIGRAM(S): at 03:02

## 2018-02-06 RX ADMIN — ONDANSETRON 4 MILLIGRAM(S): 8 TABLET, FILM COATED ORAL at 19:35

## 2018-02-06 RX ADMIN — HYDROMORPHONE HYDROCHLORIDE 30 MILLILITER(S): 2 INJECTION INTRAMUSCULAR; INTRAVENOUS; SUBCUTANEOUS at 13:42

## 2018-02-06 RX ADMIN — Medication 650 MILLIGRAM(S): at 23:39

## 2018-02-06 RX ADMIN — Medication 5 MILLIGRAM(S): at 03:07

## 2018-02-06 RX ADMIN — Medication 650 MILLIGRAM(S): at 23:02

## 2018-02-06 RX ADMIN — HYDROMORPHONE HYDROCHLORIDE 30 MILLILITER(S): 2 INJECTION INTRAMUSCULAR; INTRAVENOUS; SUBCUTANEOUS at 18:03

## 2018-02-06 RX ADMIN — Medication 0.25 MILLIGRAM(S): at 23:33

## 2018-02-06 RX ADMIN — FAMOTIDINE 20 MILLIGRAM(S): 10 INJECTION INTRAVENOUS at 20:13

## 2018-02-06 RX ADMIN — Medication 100 MILLIGRAM(S): at 14:09

## 2018-02-06 RX ADMIN — Medication 100 MILLIGRAM(S): at 21:38

## 2018-02-06 RX ADMIN — SENNA PLUS 2 TABLET(S): 8.6 TABLET ORAL at 21:38

## 2018-02-06 NOTE — PROGRESS NOTE ADULT - ASSESSMENT
Summary: POD#0 PFD, occiput to C2 fusion    NEURO:  q1h neuro checks, pain/muscle spasm control, CT in am; cont home psych meds    CARDS:  -150    PULM:  sat > 92%, IS    RENAL:  IVL once tolerating adequate PO    GASTRO:  advance as tolerated  ---> Stress ulcer prophylaxis:  PPI not indicated    HEME:  monitor H/H    ---> DVT prophylaxis: SCDs, hold anticoagulation, fresh post op    ENDO:  euglycemia    ID:  afebrile, velma op abx    Code status:  Full code  Disposition:  ICU    This patient was at high risk of neurologic deterioration and/or death due to: hemorrhage, brain compression    Time spent:  45 minutes

## 2018-02-07 ENCOUNTER — APPOINTMENT (OUTPATIENT)
Dept: NEUROLOGY | Facility: CLINIC | Age: 38
End: 2018-02-07

## 2018-02-07 LAB
APTT BLD: 30.4 SEC — SIGNIFICANT CHANGE UP (ref 27.5–37.4)
BLD GP AB SCN SERPL QL: NEGATIVE — SIGNIFICANT CHANGE UP
HCG SERPL-ACNC: <2 MIU/ML — LOW (ref 5–24)
INR BLD: 1.05 RATIO — SIGNIFICANT CHANGE UP (ref 0.88–1.16)
PROTHROM AB SERPL-ACNC: 11.5 SEC — SIGNIFICANT CHANGE UP (ref 9.8–12.7)
RH IG SCN BLD-IMP: POSITIVE — SIGNIFICANT CHANGE UP

## 2018-02-07 RX ORDER — HYDROMORPHONE HYDROCHLORIDE 2 MG/ML
30 INJECTION INTRAMUSCULAR; INTRAVENOUS; SUBCUTANEOUS
Qty: 0 | Refills: 0 | Status: DISCONTINUED | OUTPATIENT
Start: 2018-02-07 | End: 2018-02-07

## 2018-02-07 RX ORDER — HYDROMORPHONE HYDROCHLORIDE 2 MG/ML
4 INJECTION INTRAMUSCULAR; INTRAVENOUS; SUBCUTANEOUS EVERY 4 HOURS
Qty: 0 | Refills: 0 | Status: DISCONTINUED | OUTPATIENT
Start: 2018-02-07 | End: 2018-02-09

## 2018-02-07 RX ORDER — ENOXAPARIN SODIUM 100 MG/ML
40 INJECTION SUBCUTANEOUS AT BEDTIME
Qty: 0 | Refills: 0 | Status: DISCONTINUED | OUTPATIENT
Start: 2018-02-07 | End: 2018-02-09

## 2018-02-07 RX ORDER — HYDROMORPHONE HYDROCHLORIDE 2 MG/ML
2 INJECTION INTRAMUSCULAR; INTRAVENOUS; SUBCUTANEOUS EVERY 4 HOURS
Qty: 0 | Refills: 0 | Status: DISCONTINUED | OUTPATIENT
Start: 2018-02-07 | End: 2018-02-07

## 2018-02-07 RX ORDER — HYDROMORPHONE HYDROCHLORIDE 2 MG/ML
0.5 INJECTION INTRAMUSCULAR; INTRAVENOUS; SUBCUTANEOUS ONCE
Qty: 0 | Refills: 0 | Status: DISCONTINUED | OUTPATIENT
Start: 2018-02-07 | End: 2018-02-07

## 2018-02-07 RX ORDER — HYDROMORPHONE HYDROCHLORIDE 2 MG/ML
0.5 INJECTION INTRAMUSCULAR; INTRAVENOUS; SUBCUTANEOUS EVERY 4 HOURS
Qty: 0 | Refills: 0 | Status: DISCONTINUED | OUTPATIENT
Start: 2018-02-07 | End: 2018-02-07

## 2018-02-07 RX ORDER — HYDROMORPHONE HYDROCHLORIDE 2 MG/ML
0.5 INJECTION INTRAMUSCULAR; INTRAVENOUS; SUBCUTANEOUS
Qty: 0 | Refills: 0 | Status: DISCONTINUED | OUTPATIENT
Start: 2018-02-07 | End: 2018-02-07

## 2018-02-07 RX ORDER — NALOXONE HYDROCHLORIDE 4 MG/.1ML
0.1 SPRAY NASAL
Qty: 0 | Refills: 0 | Status: DISCONTINUED | OUTPATIENT
Start: 2018-02-07 | End: 2018-02-09

## 2018-02-07 RX ORDER — HYDROMORPHONE HYDROCHLORIDE 2 MG/ML
2 INJECTION INTRAMUSCULAR; INTRAVENOUS; SUBCUTANEOUS EVERY 4 HOURS
Qty: 0 | Refills: 0 | Status: DISCONTINUED | OUTPATIENT
Start: 2018-02-07 | End: 2018-02-09

## 2018-02-07 RX ORDER — HYDROMORPHONE HYDROCHLORIDE 2 MG/ML
4 INJECTION INTRAMUSCULAR; INTRAVENOUS; SUBCUTANEOUS EVERY 4 HOURS
Qty: 0 | Refills: 0 | Status: DISCONTINUED | OUTPATIENT
Start: 2018-02-07 | End: 2018-02-07

## 2018-02-07 RX ORDER — ACETAMINOPHEN 500 MG
650 TABLET ORAL ONCE
Qty: 0 | Refills: 0 | Status: COMPLETED | OUTPATIENT
Start: 2018-02-07 | End: 2018-02-07

## 2018-02-07 RX ADMIN — FAMOTIDINE 20 MILLIGRAM(S): 10 INJECTION INTRAVENOUS at 12:56

## 2018-02-07 RX ADMIN — Medication 100 MILLIGRAM(S): at 05:59

## 2018-02-07 RX ADMIN — HYDROMORPHONE HYDROCHLORIDE 0.5 MILLIGRAM(S): 2 INJECTION INTRAMUSCULAR; INTRAVENOUS; SUBCUTANEOUS at 23:45

## 2018-02-07 RX ADMIN — Medication 100 MILLIGRAM(S): at 18:49

## 2018-02-07 RX ADMIN — Medication 100 MILLIGRAM(S): at 16:13

## 2018-02-07 RX ADMIN — Medication 650 MILLIGRAM(S): at 18:52

## 2018-02-07 RX ADMIN — DULOXETINE HYDROCHLORIDE 30 MILLIGRAM(S): 30 CAPSULE, DELAYED RELEASE ORAL at 12:57

## 2018-02-07 RX ADMIN — Medication 100 MILLIGRAM(S): at 05:47

## 2018-02-07 RX ADMIN — Medication 650 MILLIGRAM(S): at 09:00

## 2018-02-07 RX ADMIN — HYDROMORPHONE HYDROCHLORIDE 4 MILLIGRAM(S): 2 INJECTION INTRAMUSCULAR; INTRAVENOUS; SUBCUTANEOUS at 20:46

## 2018-02-07 RX ADMIN — Medication 5 MILLIGRAM(S): at 18:51

## 2018-02-07 RX ADMIN — Medication 62.5 MILLIMOLE(S): at 05:47

## 2018-02-07 RX ADMIN — ENOXAPARIN SODIUM 40 MILLIGRAM(S): 100 INJECTION SUBCUTANEOUS at 20:32

## 2018-02-07 RX ADMIN — Medication 5 MILLIGRAM(S): at 12:56

## 2018-02-07 RX ADMIN — Medication 50 GRAM(S): at 05:47

## 2018-02-07 RX ADMIN — Medication 650 MILLIGRAM(S): at 03:54

## 2018-02-07 RX ADMIN — SENNA PLUS 2 TABLET(S): 8.6 TABLET ORAL at 20:32

## 2018-02-07 RX ADMIN — Medication 100 MILLIGRAM(S): at 20:32

## 2018-02-07 RX ADMIN — Medication 250 MILLIGRAM(S): at 03:33

## 2018-02-07 RX ADMIN — Medication 5 MILLIGRAM(S): at 03:32

## 2018-02-07 RX ADMIN — Medication 650 MILLIGRAM(S): at 18:22

## 2018-02-07 NOTE — DIETITIAN INITIAL EVALUATION ADULT. - OTHER INFO
Patient seen for LOS on 4 duffy. Patient is a 37 year old female with history of Chiari malformation and Us Danlos syndrome s/p suboccipital craniectomy, C1 laminectomy, and occipital condyle-C2 posterior craniocervical instrumentation and fusion on 2/5/18. Post-op MRI pending. Limited interview conducted, patient falling asleep/ uninterested in interview. Patient with limited po intake and currently on a Full liquid diet, however per family patient does not like dairy products. Patient denied any difficulties chewing or swallowing. Reported feeling constipated. Patient reported last BM: 2/4.  Last BM per flow sheet: 1/31. Patient on bowel regimen. Patient reported usually weight of ~175 pounds  and recent weight gain. Patient however unable to provide time frame, but is aware she gain weight due to being inactive. Current weight: 202 pounds.

## 2018-02-07 NOTE — PHYSICAL THERAPY INITIAL EVALUATION ADULT - PERTINENT HX OF CURRENT PROBLEM, REHAB EVAL
36 yo with a known diagnosis of Su-Danlos and Arnold Chiari malformation presenting with chest pain. Pt evaluated by a cardiologist who performed and EKG & sent her to the ED to get evaluated for chest pain &svyncope. She had a syncopal event while going down the stairs. Pt went to [facility] where she was evaluated for trauma/fractures. She also had a negative stress echo on 1/29. continued below

## 2018-02-07 NOTE — PHYSICAL THERAPY INITIAL EVALUATION ADULT - ASSISTIVE DEVICE FOR TRANSFER: GAIT, REHAB EVAL
Pt ambulated 100 feet with RW, requested to walk without RW and ambulated an additional 50 feet with supervision/rolling walker

## 2018-02-07 NOTE — DIETITIAN INITIAL EVALUATION ADULT. - ORAL INTAKE PTA
Patient reported good appetite, consuming 3 meals a day- unable to obtain diet recall patient falling asleep during interview.. NKFA. Patient endorsed not taking any vitamins or nutritional supplements./good

## 2018-02-07 NOTE — PHYSICAL THERAPY INITIAL EVALUATION ADULT - ADDITIONAL COMMENTS
CT C-Spine: Interval suboccipital craniectomy and surgical removal of the posterior C1 arch, with interval placement of posterior approach craniocervical fusion with bilateral screws/rods involving the occipital condylar region and C1-C2. No acute fracture or subluxation.No associated cervical cord syrinx. Chest XR: (-) Pt lives with , daughter and pets in a house with 3 stairs to enter and 13 stairs to the bedrooms on 2nd floor. Family available to assist if needed.      CT C-Spine: Interval suboccipital craniectomy and surgical removal of the posterior C1 arch, with interval placement of posterior approach craniocervical fusion with bilateral screws/rods involving the occipital condylar region and C1-C2. No acute fracture or subluxation.No associated cervical cord syrinx. Chest XR: (-) Pt lives with , daughter and pets in a house with 3 stairs to enter and 13 stairs to the bedrooms on 2nd floor. Family available to assist if needed.  Pt reports using SC for all functional mobility and ADLs prior to admit. Pt owns SC and RW.    CT C-Spine: Interval suboccipital craniectomy and surgical removal of the posterior C1 arch, with interval placement of posterior approach craniocervical fusion with bilateral screws/rods involving the occipital condylar region and C1-C2. No acute fracture or subluxation.No associated cervical cord syrinx. Chest XR: (-)

## 2018-02-07 NOTE — DIETITIAN INITIAL EVALUATION ADULT. - ENERGY NEEDS
Ht: 62 inches Wt:  202 pounds BMI: 36.9 kg/m2 IBW: 115 (+/-10%) %IBW: 175%    Edema: no edema. Skin: intact. no pressure ulcers noted. Ht: 62 inches Wt:  202 pounds BMI: 36.9 kg/m2 IBW: 110 (+/-10%) %IBW: 184%    Edema: no edema. Skin: intact. no pressure ulcers noted.

## 2018-02-07 NOTE — PHYSICAL THERAPY INITIAL EVALUATION ADULT - GAIT DEVIATIONS NOTED, PT EVAL
decreased velocity of limb motion/decreased stride length/decreased roel/increased stride width/decreased step length/decreased weight-shifting ability

## 2018-02-07 NOTE — PHYSICAL THERAPY INITIAL EVALUATION ADULT - DIAGNOSIS, PT EVAL
Pt presents with decreased strength, decreased endurance, decreased ability to perform ADLs, & decreased ability to participate in work/leisure activities.

## 2018-02-07 NOTE — PHYSICAL THERAPY INITIAL EVALUATION ADULT - GENERAL OBSERVATIONS, REHAB EVAL
Pt received semisupine in bed, in NAD, +IV, +hemovac, +PCA pump. Pt c/o pain in head and neck, POONAM Syed aware

## 2018-02-07 NOTE — PHYSICAL THERAPY INITIAL EVALUATION ADULT - PRECAUTIONS/LIMITATIONS, REHAB EVAL
surgical precautions/fall precautions/She had within the past year a (-)coronary CT angiogram and a CT of the Chest showing emphysema. She is being followed up by Dr Murray for her Chiari malformation. 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 continued below fall precautions/She had within the past year a (-)coronary CT angiogram and a CT of the Chest showing emphysema. She is being followed up by Dr Murray for her Chiari malformation. 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 continued below/spinal precautions/surgical precautions

## 2018-02-07 NOTE — PROGRESS NOTE ADULT - ASSESSMENT
HPI: 38 yo with a known diagnosis of Su-Danlos and Arnold Chiari malformation presenting with chest pain. She was evaluated by a cardiologist today who performed and EKG and sent her to the ED to get evaluated for her chest pain and syncope. She had a syncopal event whiile going down the stairs a week ago. At that time she went to [facility] where she was evaluated for trauma/fractures. She also had a negative stress echo on . She had within the past year a negative coronary CT angiogram and a CT of the Chest showing emphysema. She is being followed up by Dr Murray for her Chiari malformation.    PAST MEDICAL HISTORY   Tethered cord syndrome  EDS (Su-Danlos syndrome)  Chiari I malformation  Irritable bowel syndrome without diarrhea  Cervical disc disease  Low back pain  Asthma    PAST SURGICAL HISTORY   S/P myelogram  History of tonsillectomy  History of cholecystectomy  H/O:   S/P lumbar laminectomy    Bee Stings (Anaphylaxis)  Keflex (Anaphylaxis)  latex (Rash)  penicillins (Anaphylaxis)      PROCEDURE:  18 s/p occiput to C2 fusion    PLAN:  Neuro: cont PCA, IVF, drain, instructed on shoulder shrugs/rolls and use of heat/ice packs, increase OOB  Respiratory: patient instructed on incentive spirometer         DVT ppx: [] SQH [x] SQL and venodynes bilaterally  GI: bowel regimen  awaiting post op MRI  CT imaging reviewed by surgeons with no further surgical intervention recommended  PT/OT: outpatient PT, needs stair clearance prior to d/c    Will discuss with Dr Adams  [facility] # [id number]

## 2018-02-07 NOTE — PHYSICAL THERAPY INITIAL EVALUATION ADULT - DID THE PATIENT HAVE SURGERY?
Suboccipital craniectomy and C1 laminectomy. Stereotactic placement of bilateral occipital condyle-C2 posterior craniocervical instrumentation and fusion.Intraoperative reduction of clivoaxial angle./yes

## 2018-02-08 PROCEDURE — 72141 MRI NECK SPINE W/O DYE: CPT | Mod: 26

## 2018-02-08 RX ORDER — POLYETHYLENE GLYCOL 3350 17 G/17G
17 POWDER, FOR SOLUTION ORAL
Qty: 0 | Refills: 0 | Status: DISCONTINUED | OUTPATIENT
Start: 2018-02-08 | End: 2018-02-09

## 2018-02-08 RX ORDER — DIAZEPAM 5 MG
5 TABLET ORAL EVERY 8 HOURS
Qty: 0 | Refills: 0 | Status: DISCONTINUED | OUTPATIENT
Start: 2018-02-08 | End: 2018-02-09

## 2018-02-08 RX ORDER — CYCLOBENZAPRINE HYDROCHLORIDE 10 MG/1
5 TABLET, FILM COATED ORAL THREE TIMES A DAY
Qty: 0 | Refills: 0 | Status: DISCONTINUED | OUTPATIENT
Start: 2018-02-08 | End: 2018-02-09

## 2018-02-08 RX ORDER — HYDROMORPHONE HYDROCHLORIDE 2 MG/ML
0.5 INJECTION INTRAMUSCULAR; INTRAVENOUS; SUBCUTANEOUS ONCE
Qty: 0 | Refills: 0 | Status: DISCONTINUED | OUTPATIENT
Start: 2018-02-08 | End: 2018-02-08

## 2018-02-08 RX ADMIN — HYDROMORPHONE HYDROCHLORIDE 4 MILLIGRAM(S): 2 INJECTION INTRAMUSCULAR; INTRAVENOUS; SUBCUTANEOUS at 02:28

## 2018-02-08 RX ADMIN — Medication 100 MILLIGRAM(S): at 22:39

## 2018-02-08 RX ADMIN — HYDROMORPHONE HYDROCHLORIDE 4 MILLIGRAM(S): 2 INJECTION INTRAMUSCULAR; INTRAVENOUS; SUBCUTANEOUS at 11:21

## 2018-02-08 RX ADMIN — HYDROMORPHONE HYDROCHLORIDE 4 MILLIGRAM(S): 2 INJECTION INTRAMUSCULAR; INTRAVENOUS; SUBCUTANEOUS at 06:28

## 2018-02-08 RX ADMIN — Medication 5 MILLIGRAM(S): at 02:28

## 2018-02-08 RX ADMIN — Medication 5 MILLIGRAM(S): at 22:39

## 2018-02-08 RX ADMIN — Medication 100 MILLIGRAM(S): at 14:00

## 2018-02-08 RX ADMIN — Medication 100 MILLIGRAM(S): at 06:28

## 2018-02-08 RX ADMIN — Medication 100 MILLIGRAM(S): at 17:12

## 2018-02-08 RX ADMIN — SENNA PLUS 2 TABLET(S): 8.6 TABLET ORAL at 22:39

## 2018-02-08 RX ADMIN — CYCLOBENZAPRINE HYDROCHLORIDE 5 MILLIGRAM(S): 10 TABLET, FILM COATED ORAL at 09:19

## 2018-02-08 RX ADMIN — Medication 5 MILLIGRAM(S): at 14:00

## 2018-02-08 RX ADMIN — HYDROMORPHONE HYDROCHLORIDE 4 MILLIGRAM(S): 2 INJECTION INTRAMUSCULAR; INTRAVENOUS; SUBCUTANEOUS at 03:07

## 2018-02-08 RX ADMIN — HYDROMORPHONE HYDROCHLORIDE 0.5 MILLIGRAM(S): 2 INJECTION INTRAMUSCULAR; INTRAVENOUS; SUBCUTANEOUS at 00:00

## 2018-02-08 RX ADMIN — HYDROMORPHONE HYDROCHLORIDE 4 MILLIGRAM(S): 2 INJECTION INTRAMUSCULAR; INTRAVENOUS; SUBCUTANEOUS at 23:00

## 2018-02-08 RX ADMIN — HYDROMORPHONE HYDROCHLORIDE 0.5 MILLIGRAM(S): 2 INJECTION INTRAMUSCULAR; INTRAVENOUS; SUBCUTANEOUS at 08:52

## 2018-02-08 RX ADMIN — HYDROMORPHONE HYDROCHLORIDE 4 MILLIGRAM(S): 2 INJECTION INTRAMUSCULAR; INTRAVENOUS; SUBCUTANEOUS at 17:13

## 2018-02-08 RX ADMIN — ENOXAPARIN SODIUM 40 MILLIGRAM(S): 100 INJECTION SUBCUTANEOUS at 22:39

## 2018-02-08 RX ADMIN — DULOXETINE HYDROCHLORIDE 30 MILLIGRAM(S): 30 CAPSULE, DELAYED RELEASE ORAL at 11:21

## 2018-02-08 RX ADMIN — HYDROMORPHONE HYDROCHLORIDE 4 MILLIGRAM(S): 2 INJECTION INTRAMUSCULAR; INTRAVENOUS; SUBCUTANEOUS at 22:39

## 2018-02-08 NOTE — PROGRESS NOTE ADULT - ATTENDING COMMENTS
I saw and evaluated the patient.  The patient is a 37-year-old female with a history of Chiari 1 malformation and Su-Danlos syndrome s/p suboccipital craniectomy, C1 laminectomy, and occipital condyle-C2 posterior craniocervical instrumentation and fusion on 2/5/18.  The patient is currently neurologically stable.  Post-operative MRI pending.  Continue Hemovac drain.  Begin mobilization OOB with PT and disposition planning.
I saw and evaluated the patient.  The patient is a 37-year-old female with a history of Chiari 1 malformation and Su-Danlos syndrome s/p suboccipital craniectomy, C1 laminectomy, and occipital condyle-C2 posterior craniocervical instrumentation and fusion on 2/5/18.  The patient is currently neurologically stable.  Post-operative MRI pending.  Continue Hemovac drain.  D/C Borja catheter when more ambulatory/OOB.  Begin mobilization OOB with PT and disposition planning.
I saw and evaluated the patient.  The patient is a 37-year-old female with a history of Chiari 1 malformation and Su-Danlos syndrome s/p suboccipital craniectomy, C1 laminectomy, and occipital condyle-C2 posterior craniocervical instrumentation and fusion on 2/5/18.  The patient is currently neurologically stable.  Post-operative MRI pending.  D/C Hemovac drain prior to discharge, likely tomorrow.  Continue mobilization OOB with PT, and plan for disposition home with outpatient PT, likely tomorrow, pending clearance on stairs.
I saw and evaluated the patient.  The patient is a 37-year-old female with a history of Chiari 1 malformation and Su-Danlos syndrome who presents with syncopal episodes, which seem to occur upon hyperflexion of her craniovertebral junction, such as when she looks downwards while descending a flight of stairs.  Her flexion and extension MRIs of her cervical spine confirm craniovertebral instability, with a CXA of 124 degrees in flexion and 147 degrees in extension.     I would recommend continuing her Cardiology and Neurology work-ups for syncope for now.  If no other causes for her syncopal episodes are found with non-surgical solutions, the patient may benefit from a suboccipital craniectomy, C1 laminectomy, and occipital condyle-C2 posterior craniocervical instrumentation and fusion for decompression of her Chiari 1 malformation and stabilization of her craniovertebral instability.  The surgery is tentatively planned for Monday afternoon with both Dr. Murray and myself.  The risks, benefits, and alternatives to surgery were discussed with the patient, who expressed understanding, and wishes to proceed with surgery, if no other causes are found.
I saw and evaluated the patient.  The patient is a 37-year-old female with a history of Chiari 1 malformation and Su-Danlos syndrome who presents with syncopal episodes, which seem to occur upon hyperflexion of her craniovertebral junction, such as when she looks downwards while descending a flight of stairs.  Her flexion and extension MRIs of her cervical spine confirm craniovertebral instability, with a CXA of 124 degrees in flexion and 147 degrees in extension.  Her images reviewed by Dr. Maulik Castillo, who is in agreement.    As the patient's Cardiology work-up remains negative, I believe the patient may benefit from a suboccipital craniectomy, C1 laminectomy, and occipital condyle-C2 posterior craniocervical instrumentation and fusion for decompression of her Chiari 1 malformation and stabilization of her craniovertebral instability.  The surgery is planned for today with both Dr. Murray and myself.  The risks, benefits, and alternatives to surgery were discussed with both the patient and her , who both expressed understanding, and both wish to proceed with surgery.
I saw and evaluated the patient.  The patient is a 37-year-old female with a history of Chiari 1 malformation and Su-Danlos syndrome who presents with syncopal episodes, which seem to occur upon hyperflexion of her craniovertebral junction, such as when she looks downwards while descending a flight of stairs.  Her flexion and extension MRIs of her cervical spine confirm craniovertebral instability, with a CXA of 124 degrees in flexion and 147 degrees in extension.  Her images reviewed by Dr. Maulik Castillo, who is in agreement.    Cardiology work-up negative to date.  If no other causes for her syncopal episodes are found with non-surgical solutions, I belive the patient may benefit from a suboccipital craniectomy, C1 laminectomy, and occipital condyle-C2 posterior craniocervical instrumentation and fusion for decompression of her Chiari 1 malformation and stabilization of her craniovertebral instability.  The surgery is tentatively planned for Monday afternoon with both Dr. Murray and myself.  The risks, benefits, and alternatives to surgery were discussed with both the patient and her , who both expressed understanding, and both wish to proceed with surgery, if no other causes are found.  Dr. Castillo, who is in agreement with the plan, will also speak to the patient Monday morning.
I saw and evaluated the patient.  The patient is a 37-year-old female with a history of Chiari 1 malformation and Su-Danlos syndrome who presents with syncopal episodes, which seem to occur upon hyperflexion of her craniovertebral junction, such as when she looks downwards while descending a flight of stairs.  Her flexion and extension MRIs of her cervical spine confirm craniovertebral instability, with a CXA of 124 degrees in flexion and 147 degrees in extension.  Her images reviewed by Dr. Maulik Castillo, who is in agreement.    I would recommend continuing her Cardiology and Neurology work-ups for syncope for now.  If no other causes for her syncopal episodes are found with non-surgical solutions, the patient may benefit from a suboccipital craniectomy, C1 laminectomy, and occipital condyle-C2 posterior craniocervical instrumentation and fusion for decompression of her Chiari 1 malformation and stabilization of her craniovertebral instability.  The surgery is tentatively planned for Monday afternoon with both Dr. Murray and myself.  The risks, benefits, and alternatives to surgery were discussed with the patient, who expressed understanding, and wishes to proceed with surgery, if no other causes are found.  Dr. Castillo, who is in agreement with the plan, will also speak to the patient Monday morning.

## 2018-02-08 NOTE — PROGRESS NOTE ADULT - ASSESSMENT
HPI:  p ([id number])     HPI: 38 yo with a known diagnosis of Su-Danlos and Arnold Chiari malformation presenting with chest pain. She was evaluated by a cardiologist today who performed and EKG and sent her to the ED to get evaluated for her chest pain and syncope. She had a syncopal event whiile going down the stairs a week ago. At that time she went to [facility] where she was evaluated for trauma/fractures. She also had a negative stress echo on 1/29. She had within the past year a negative coronary CT angiogram and a CT of the Chest showing emphysema. She is being followed up by Dr Murray for her Chiari malformation.    s/p PFD and occiput to c2 fusion -2/5

## 2018-02-08 NOTE — PROGRESS NOTE ADULT - ASSESSMENT
SYNCOPE  POTS    normal orthostatic vitals   cont current meds  normal echo   CV stable   OOB to chair as tolerated

## 2018-02-09 ENCOUNTER — TRANSCRIPTION ENCOUNTER (OUTPATIENT)
Age: 38
End: 2018-02-09

## 2018-02-09 VITALS
DIASTOLIC BLOOD PRESSURE: 75 MMHG | TEMPERATURE: 98 F | RESPIRATION RATE: 18 BRPM | SYSTOLIC BLOOD PRESSURE: 117 MMHG | HEART RATE: 87 BPM | OXYGEN SATURATION: 99 %

## 2018-02-09 LAB
ANION GAP SERPL CALC-SCNC: 12 MMOL/L — SIGNIFICANT CHANGE UP (ref 5–17)
BUN SERPL-MCNC: 9 MG/DL — SIGNIFICANT CHANGE UP (ref 7–23)
CALCIUM SERPL-MCNC: 8.2 MG/DL — LOW (ref 8.4–10.5)
CHLORIDE SERPL-SCNC: 110 MMOL/L — HIGH (ref 96–108)
CO2 SERPL-SCNC: 18 MMOL/L — LOW (ref 22–31)
CREAT SERPL-MCNC: 0.33 MG/DL — LOW (ref 0.5–1.3)
GLUCOSE SERPL-MCNC: 84 MG/DL — SIGNIFICANT CHANGE UP (ref 70–99)
HCT VFR BLD CALC: 32.5 % — LOW (ref 34.5–45)
HGB BLD-MCNC: 10.1 G/DL — LOW (ref 11.5–15.5)
MCHC RBC-ENTMCNC: 29.2 PG — SIGNIFICANT CHANGE UP (ref 27–34)
MCHC RBC-ENTMCNC: 31.1 GM/DL — LOW (ref 32–36)
MCV RBC AUTO: 93.9 FL — SIGNIFICANT CHANGE UP (ref 80–100)
PLATELET # BLD AUTO: 245 K/UL — SIGNIFICANT CHANGE UP (ref 150–400)
POTASSIUM SERPL-MCNC: 3.4 MMOL/L — LOW (ref 3.5–5.3)
POTASSIUM SERPL-SCNC: 3.4 MMOL/L — LOW (ref 3.5–5.3)
RBC # BLD: 3.46 M/UL — LOW (ref 3.8–5.2)
RBC # FLD: 13.5 % — SIGNIFICANT CHANGE UP (ref 10.3–14.5)
SODIUM SERPL-SCNC: 140 MMOL/L — SIGNIFICANT CHANGE UP (ref 135–145)
WBC # BLD: 7.45 K/UL — SIGNIFICANT CHANGE UP (ref 3.8–10.5)
WBC # FLD AUTO: 7.45 K/UL — SIGNIFICANT CHANGE UP (ref 3.8–10.5)

## 2018-02-09 PROCEDURE — C1713: CPT

## 2018-02-09 PROCEDURE — 81003 URINALYSIS AUTO W/O SCOPE: CPT

## 2018-02-09 PROCEDURE — 84100 ASSAY OF PHOSPHORUS: CPT

## 2018-02-09 PROCEDURE — 83690 ASSAY OF LIPASE: CPT

## 2018-02-09 PROCEDURE — 97162 PT EVAL MOD COMPLEX 30 MIN: CPT

## 2018-02-09 PROCEDURE — 85730 THROMBOPLASTIN TIME PARTIAL: CPT

## 2018-02-09 PROCEDURE — 80048 BASIC METABOLIC PNL TOTAL CA: CPT

## 2018-02-09 PROCEDURE — 83735 ASSAY OF MAGNESIUM: CPT

## 2018-02-09 PROCEDURE — 82550 ASSAY OF CK (CPK): CPT

## 2018-02-09 PROCEDURE — 72125 CT NECK SPINE W/O DYE: CPT

## 2018-02-09 PROCEDURE — 76000 FLUOROSCOPY <1 HR PHYS/QHP: CPT

## 2018-02-09 PROCEDURE — 99285 EMERGENCY DEPT VISIT HI MDM: CPT | Mod: 25

## 2018-02-09 PROCEDURE — 86140 C-REACTIVE PROTEIN: CPT

## 2018-02-09 PROCEDURE — 80053 COMPREHEN METABOLIC PANEL: CPT

## 2018-02-09 PROCEDURE — C1889: CPT

## 2018-02-09 PROCEDURE — 97116 GAIT TRAINING THERAPY: CPT

## 2018-02-09 PROCEDURE — 84702 CHORIONIC GONADOTROPIN TEST: CPT

## 2018-02-09 PROCEDURE — 86923 COMPATIBILITY TEST ELECTRIC: CPT

## 2018-02-09 PROCEDURE — 36430 TRANSFUSION BLD/BLD COMPNT: CPT

## 2018-02-09 PROCEDURE — 84443 ASSAY THYROID STIM HORMONE: CPT

## 2018-02-09 PROCEDURE — 93306 TTE W/DOPPLER COMPLETE: CPT

## 2018-02-09 PROCEDURE — P9011: CPT

## 2018-02-09 PROCEDURE — 82553 CREATINE MB FRACTION: CPT

## 2018-02-09 PROCEDURE — 86850 RBC ANTIBODY SCREEN: CPT

## 2018-02-09 PROCEDURE — 82330 ASSAY OF CALCIUM: CPT

## 2018-02-09 PROCEDURE — C1769: CPT

## 2018-02-09 PROCEDURE — 85027 COMPLETE CBC AUTOMATED: CPT

## 2018-02-09 PROCEDURE — 86901 BLOOD TYPING SEROLOGIC RH(D): CPT

## 2018-02-09 PROCEDURE — 84484 ASSAY OF TROPONIN QUANT: CPT

## 2018-02-09 PROCEDURE — 71046 X-RAY EXAM CHEST 2 VIEWS: CPT

## 2018-02-09 PROCEDURE — 86900 BLOOD TYPING SEROLOGIC ABO: CPT

## 2018-02-09 PROCEDURE — 85610 PROTHROMBIN TIME: CPT

## 2018-02-09 PROCEDURE — 72141 MRI NECK SPINE W/O DYE: CPT

## 2018-02-09 PROCEDURE — 84703 CHORIONIC GONADOTROPIN ASSAY: CPT

## 2018-02-09 RX ORDER — DIAZEPAM 5 MG
1 TABLET ORAL
Qty: 15 | Refills: 0 | OUTPATIENT
Start: 2018-02-09 | End: 2018-02-13

## 2018-02-09 RX ORDER — ACETAMINOPHEN 500 MG
2 TABLET ORAL
Qty: 0 | Refills: 0 | COMMUNITY
Start: 2018-02-09

## 2018-02-09 RX ORDER — HYDROMORPHONE HYDROCHLORIDE 2 MG/ML
1 INJECTION INTRAMUSCULAR; INTRAVENOUS; SUBCUTANEOUS
Qty: 0 | Refills: 0 | COMMUNITY

## 2018-02-09 RX ORDER — HYDROMORPHONE HYDROCHLORIDE 2 MG/ML
1 INJECTION INTRAMUSCULAR; INTRAVENOUS; SUBCUTANEOUS
Qty: 20 | Refills: 0 | OUTPATIENT
Start: 2018-02-09 | End: 2018-02-13

## 2018-02-09 RX ORDER — POTASSIUM CHLORIDE 20 MEQ
40 PACKET (EA) ORAL ONCE
Qty: 0 | Refills: 0 | Status: COMPLETED | OUTPATIENT
Start: 2018-02-09 | End: 2018-02-09

## 2018-02-09 RX ORDER — HYDROMORPHONE HYDROCHLORIDE 2 MG/ML
1 INJECTION INTRAMUSCULAR; INTRAVENOUS; SUBCUTANEOUS
Qty: 30 | Refills: 0 | OUTPATIENT
Start: 2018-02-09 | End: 2018-02-13

## 2018-02-09 RX ORDER — METOPROLOL TARTRATE 50 MG
0.5 TABLET ORAL
Qty: 0 | Refills: 0 | COMMUNITY

## 2018-02-09 RX ORDER — SENNA PLUS 8.6 MG/1
2 TABLET ORAL
Qty: 0 | Refills: 0 | COMMUNITY
Start: 2018-02-09

## 2018-02-09 RX ADMIN — CYCLOBENZAPRINE HYDROCHLORIDE 5 MILLIGRAM(S): 10 TABLET, FILM COATED ORAL at 11:20

## 2018-02-09 RX ADMIN — Medication 5 MILLIGRAM(S): at 06:35

## 2018-02-09 RX ADMIN — Medication 40 MILLIEQUIVALENT(S): at 10:12

## 2018-02-09 RX ADMIN — DULOXETINE HYDROCHLORIDE 30 MILLIGRAM(S): 30 CAPSULE, DELAYED RELEASE ORAL at 11:20

## 2018-02-09 RX ADMIN — Medication 100 MILLIGRAM(S): at 06:35

## 2018-02-09 RX ADMIN — HYDROMORPHONE HYDROCHLORIDE 4 MILLIGRAM(S): 2 INJECTION INTRAMUSCULAR; INTRAVENOUS; SUBCUTANEOUS at 08:14

## 2018-02-09 RX ADMIN — POLYETHYLENE GLYCOL 3350 17 GRAM(S): 17 POWDER, FOR SOLUTION ORAL at 06:35

## 2018-02-09 NOTE — DISCHARGE NOTE ADULT - CARE PLAN
Principal Discharge DX:	Chiari I malformation  Goal:	increase activity  Assessment and plan of treatment:	Follow up with Dr. Murray in 7-10 days. Please call office to confirm appointment-Neurosurgeon   Follow up with PMD in 10 days   Follow up with personal neurologist Dr. Heredia in 10 days

## 2018-02-09 NOTE — PROGRESS NOTE ADULT - PROBLEM SELECTOR PLAN 1
1 Dr Walsh to see   2 out of bed   3 check orthostatic vitals   4 echo :p   5 appreciate cardio consult   6 continue topamax, klonipin   7 continue valium   8 dvt ppx sql scds   9 continue metoprolol   10 dispo :p
s/p pfd   1 Out of bed   2 continue pt   3 prn pain meds   4 dvt ppx sql scds   5 muscle relaxants   6 continue cymbalta   7 regular diet   8 stool softeners   9 dc home today
s/p pfd   1 Out of bed   2 continue pt   3 prn pain meds   4 dvt ppx sql scds   5 muscle relaxants   6 continue cymbalta   7 regular diet   8 stool softeners   9 dispo:p

## 2018-02-09 NOTE — DISCHARGE NOTE ADULT - NS AS ACTIVITY OBS
No Heavy lifting/straining/Walking-Outdoors allowed/Stairs allowed/Showering allowed/Walking-Indoors allowed/Do not make important decisions/Do not drive or operate machinery

## 2018-02-09 NOTE — DISCHARGE NOTE ADULT - CARE PROVIDERS DIRECT ADDRESSES
[email or web address]

## 2018-02-09 NOTE — DISCHARGE NOTE ADULT - REASON FOR ADMISSION
Patient was admitted on 1/31 with history of chiari malformation. Patient had posterior fossa decompression and occiput to c2 fusion 2/5

## 2018-02-09 NOTE — DISCHARGE NOTE ADULT - PATIENT PORTAL LINK FT
You can access the [email or web address] Patient Portal, offered by [facility], by registering with the following website: [email or web address]

## 2018-02-09 NOTE — DISCHARGE NOTE ADULT - MEDICATION SUMMARY - MEDICATIONS TO TAKE
I will START or STAY ON the medications listed below when I get home from the hospital:    acetaminophen 325 mg oral tablet  -- 2 tab(s) by mouth every 6 hours, As needed, Mild Pain (1 - 3)  -- Indication: For pain    HYDROmorphone 2 mg oral tablet  -- 1 tab(s) by mouth every 4 hours, As needed, Moderate Pain (4 - 6) MDD:6  -- Indication: For pain    HYDROmorphone 4 mg oral tablet  -- 1 tab(s) by mouth every 6 hours, As Needed -Severe Pain (7 - 10) MDD:4  -- Indication: For pain    diazePAM 5 mg oral tablet  -- 1 tab(s) by mouth every 8 hours as needed for muscle spasm MDD:3  -- Indication: For muscle spasm    Topamax 100 mg oral tablet  -- 1 tab(s) by mouth 2 times a day  -- Indication: For mood     Cymbalta 30 mg oral delayed release capsule  -- 1 cap(s) by mouth once a day  -- Indication: For mood     Ventolin HFA 90 mcg/inh inhalation aerosol  -- 2 puff(s) inhaled , As Needed  -- Indication: For breathing aid     Colace 100 mg oral capsule  -- 1 - 3 cap(s) by mouth every other day  -- Indication: For Stool softener     bisacodyl 5 mg oral delayed release tablet  -- 1 to 3 tab(s) by mouth , As Needed  -- Indication: For Stool softner     senna oral tablet  -- 2 tab(s) by mouth once a day (at bedtime)  -- Indication: For Stool softener     Flexeril  -- 10 milligram(s) by mouth 3 times a day  -- Indication: For muscle relaxant I will START or STAY ON the medications listed below when I get home from the hospital:    acetaminophen 325 mg oral tablet  -- 2 tab(s) by mouth every 6 hours, As needed, Mild Pain (1 - 3)  -- Indication: For pain    HYDROmorphone 2 mg oral tablet  -- 1 tab(s) by mouth every 4 hours, As needed, Moderate Pain (4 - 6) MDD:6  -- Indication: For pain    HYDROmorphone 4 mg oral tablet  -- 1 tab(s) by mouth every 6 hours, As Needed -Severe Pain (7 - 10) MDD:4  -- Indication: For pain    diazePAM 5 mg oral tablet  -- 1 tab(s) by mouth every 8 hours as needed for muscle spasm MDD:3  -- Indication: For muscle spasm    Topamax 100 mg oral tablet  -- 1 tab(s) by mouth 2 times a day  -- Indication: For mood     Cymbalta 30 mg oral delayed release capsule  -- 1 cap(s) by mouth once a day  -- Indication: For mood     Ventolin HFA 90 mcg/inh inhalation aerosol  -- 2 puff(s) inhaled , As Needed  -- Indication: For breathing aid     Colace 100 mg oral capsule  -- 1 - 3 cap(s) by mouth every other day  -- Indication: For Stool softeners     bisacodyl 5 mg oral delayed release tablet  -- 1 to 3 tab(s) by mouth , As Needed  -- Indication: For Stool softeners     senna oral tablet  -- 2 tab(s) by mouth once a day (at bedtime)  -- Indication: For Stool softeners     Flexeril  -- 10 milligram(s) by mouth 3 times a day  -- Indication: For muscle spasm I will START or STAY ON the medications listed below when I get home from the hospital:    Outpatient physical therapy   -- 3 times a week for 4 weeks   start after office visit with neurosurgeon   -- Indication: For physical therapy     acetaminophen 325 mg oral tablet  -- 2 tab(s) by mouth every 6 hours, As needed, Mild Pain (1 - 3)  -- Indication: For pain    HYDROmorphone 2 mg oral tablet  -- 1 tab(s) by mouth every 4 hours, As needed, Moderate Pain (4 - 6) MDD:6  -- Indication: For pain    HYDROmorphone 4 mg oral tablet  -- 1 tab(s) by mouth every 6 hours, As Needed -Severe Pain (7 - 10) MDD:4  -- Indication: For pain    diazePAM 5 mg oral tablet  -- 1 tab(s) by mouth every 8 hours as needed for muscle spasm MDD:3  -- Indication: For muscle spasm    Topamax 100 mg oral tablet  -- 1 tab(s) by mouth 2 times a day  -- Indication: For mood     Cymbalta 30 mg oral delayed release capsule  -- 1 cap(s) by mouth once a day  -- Indication: For mood     Ventolin HFA 90 mcg/inh inhalation aerosol  -- 2 puff(s) inhaled , As Needed  -- Indication: For breathing aid     Colace 100 mg oral capsule  -- 1 - 3 cap(s) by mouth every other day  -- Indication: For Stool softeners     bisacodyl 5 mg oral delayed release tablet  -- 1 to 3 tab(s) by mouth , As Needed  -- Indication: For Stool softeners     senna oral tablet  -- 2 tab(s) by mouth once a day (at bedtime)  -- Indication: For Stool softeners     Flexeril  -- 10 milligram(s) by mouth 3 times a day  -- Indication: For muscle spasm

## 2018-02-09 NOTE — PROGRESS NOTE ADULT - ASSESSMENT
HPI:  p ([id number])     HPI: 38 yo with a known diagnosis of Su-Danlos and Arnold Chiari malformation presenting with chest pain. She was evaluated by a cardiologist today who performed and EKG and sent her to the ED to get evaluated for her chest pain and syncope. She had a syncopal event whiile going down the stairs a week ago. At that time she went to [facility] where she was evaluated for trauma/fractures. She also had a negative stress echo on 1/29. She had within the past year a negative coronary CT angiogram and a CT of the Chest showing emphysema. She is being followed up by Dr Murray for her Chiari malformation.    s/p PFD and occiput to c2 fusion -2/5

## 2018-02-09 NOTE — DISCHARGE NOTE ADULT - MEDICATION SUMMARY - MEDICATIONS TO STOP TAKING
I will STOP taking the medications listed below when I get home from the hospital:    predniSONE 20 mg oral tablet  -- 2 tab(s) by mouth once a day  -- It is very important that you take or use this exactly as directed.  Do not skip doses or discontinue unless directed by your doctor.  Obtain medical advice before taking any non-prescription drugs as some may affect the action of this medication.  Take with food or milk.

## 2018-02-09 NOTE — DISCHARGE NOTE ADULT - CARE PROVIDER_API CALL
Jennifer Murray (), Neurological Surgery  [address]  [address]  [place] [address]  Phone: [phone]  Fax: [phone]    Ruperto Heredia), Neurology  [address]  [place] [address]  Phone: [phone]  Fax: [phone]

## 2018-02-09 NOTE — DISCHARGE NOTE ADULT - ADDITIONAL INSTRUCTIONS
May take shower 4 days after surgery-Let water run over the surgical site and pat dry after shower.   If notices any severe headache, weakness, fever , numbness or tingling then please come back to hospital. May take shower 4 days after surgery-Let water run over the surgical site and pat dry after shower.   If notices any severe headache, weakness, fever , numbness or tingling then please come back to hospital.  please continue metoprolol at home

## 2018-02-09 NOTE — DISCHARGE NOTE ADULT - PLAN OF CARE
increase activity Follow up with Dr. Murray in 7-10 days. Please call office to confirm appointment-Neurosurgeon   Follow up with PMD in 10 days   Follow up with personal neurologist Dr. Heredia in 10 days

## 2018-02-09 NOTE — PROGRESS NOTE ADULT - PROVIDER SPECIALTY LIST ADULT
Anesthesia
Cardiology
NSICU
Neurosurgery

## 2018-02-09 NOTE — DISCHARGE NOTE ADULT - HOSPITAL COURSE
Patient had posterior fossa decompression and fusion/ occiput to c2 fusion done on 2/5. Post surgery patient was admitted under icu care and was watched. Patient was given pain meds, ivf and was started on routine home meds. Patient was transferred to floor subsequently Patient was seen by physical therapy and was cleared for discharge. Patient's drain was removed. Patient was also seen by cardiologist for clearance. No other deficits noted. Patient was discharged home with follow up instruction.

## 2018-02-09 NOTE — PROGRESS NOTE ADULT - SUBJECTIVE AND OBJECTIVE BOX
SUBJECTIVE: Patient was seen and evaluated at bedside. Patient is resting comfortably in bed and is in no new acute distress.     OVERNIGHT EVENTS: none     Vital Signs Last 24 Hrs  T(C): 36.8 (01 Feb 2018 07:39), Max: 37 (31 Jan 2018 19:25)  T(F): 98.2 (01 Feb 2018 07:39), Max: 98.6 (31 Jan 2018 19:25)  HR: 82 (01 Feb 2018 07:39) (77 - 112)  BP: 96/66 (01 Feb 2018 07:39) (96/66 - 143/73)  BP(mean): --  RR: 18 (01 Feb 2018 07:39) (15 - 18)  SpO2: 98% (01 Feb 2018 07:39) (98% - 100%)    PHYSICAL EXAM:    General: No Acute Distress     Neurological: Awake, alert oriented to person, place and time, Following Commands, PERRL, EOMI, Face Symmetrical, Speech Fluent, Moving all extremities, Muscle Strength normal in all four extremities, No Drift, Sensation to Light Touch Intact    Pulmonary: Clear to Auscultation, No Rales, No Rhonchi, No Wheezes     Cardiovascular: S1, S2, Regular Rate and Rhythm     Gastrointestinal: Soft, Nontender, Nondistended     Extremities: No calf tenderness     Incision:     LABS:                        14.9   9.3   )-----------( 267      ( 31 Jan 2018 13:56 )             42.6    01-31    140  |  107  |  11  ----------------------------<  93  4.1   |  24  |  0.75    Ca    9.3      31 Jan 2018 13:56    TPro  6.8  /  Alb  4.0  /  TBili  0.3  /  DBili  x   /  AST  14  /  ALT  12  /  AlkPhos  73  01-31 01-31 @ 07:01  -  02-01 @ 07:00  --------------------------------------------------------  IN: 480 mL / OUT: 0 mL / NET: 480 mL      DRAINS:     MEDICATIONS:  Antibiotics:    Neuro:  acetaminophen   Tablet. 650 milliGRAM(s) Oral every 6 hours PRN Mild Pain (1 - 3)  clonazePAM Tablet 1 milliGRAM(s) Oral at bedtime PRN INSOMNIA/ANXIETY  cyclobenzaprine 10 milliGRAM(s) Oral three times a day PRN Muscle Spasm  DULoxetine 30 milliGRAM(s) Oral daily  HYDROmorphone   Tablet 2 milliGRAM(s) Oral every 4 hours PRN Moderate Pain (4 - 6)  ondansetron   Disintegrating Tablet 4 milliGRAM(s) Oral every 6 hours PRN Nausea  topiramate 100 milliGRAM(s) Oral two times a day    Cardiac:  metoprolol     tartrate 12.5 milliGRAM(s) Oral at bedtime    Pulm:    GI/:  bisacodyl 5 milliGRAM(s) Oral daily PRN Constipation  senna 2 Tablet(s) Oral at bedtime PRN Constipation    Other:   enoxaparin Injectable 40 milliGRAM(s) SubCutaneous every 24 hours  influenza   Vaccine 0.5 milliLiter(s) IntraMuscular once    DIET: [] Regular [] CCD [] Renal [] Puree [] Dysphagia [] Tube Feeds: regular     IMAGING:
CHIEF COMPLAINT: patient c/o incisional pain and muscle spasms, sitting in chair, denies n/n/visual changes  +PCA +IVF +HMVC    Vital Signs Last 24 Hrs  T(C): 36.6 (07 Feb 2018 16:08), Max: 36.8 (06 Feb 2018 20:00)  T(F): 97.8 (07 Feb 2018 16:08), Max: 98.2 (06 Feb 2018 20:00)  HR: 96 (07 Feb 2018 16:08) (66 - 103)  BP: 120/76 (07 Feb 2018 16:08) (96/61 - 121/78)  BP(mean): 78 (06 Feb 2018 22:00) (73 - 78)  RR: 18 (07 Feb 2018 16:08) (16 - 18)  SpO2: 100% (07 Feb 2018 16:08) (94% - 100%)    DRAINS: [] DENISE (cc/24h) [x] HMV (130cc/24h)    PHYSICAL EXAM:    General: No Acute Distress     Neurological: Awake, alert oriented to person, place and time, Following Commands, PERRL, EOMI, Face Symmetrical, Speech Fluent, Moving all extremities, Muscle Strength normal in all four extremities, No Drift, Sensation to Light Touch Intact    Pulmonary: Clear to Auscultation, No Rales, No Rhonchi, No Wheezes     Cardiovascular: S1, S2, Regular Rate and Rhythm     Gastrointestinal: Soft, Nontender, Nondistended     Extremities: No calf tenderness     Incision: +dressing c/d/i +drain;  no fluctuance noted    LABS:                        11.8   11.5  )-----------( 186      ( 06 Feb 2018 22:14 )             34.0    02-06    139  |  109<H>  |  12  ----------------------------<  123<H>  4.1   |  24  |  0.51    Ca    8.2<L>      06 Feb 2018 22:14  Phos  2.5     02-06  Mg     1.8     02-06    PT/INR - ( 07 Feb 2018 06:36 )   PT: 11.5 sec;   INR: 1.05 ratio         PTT - ( 07 Feb 2018 06:36 )  PTT:30.4 sec      02-06 @ 07:01  -  02-07 @ 07:00  --------------------------------------------------------  IN: 2230 mL / OUT: 830 mL / NET: 1400 mL    02-07 @ 07:01 - 02-07 @ 16:52  --------------------------------------------------------  IN: 300 mL / OUT: 60 mL / NET: 240 mL        MEDICATIONS:  Anticoagulation:  enoxaparin Injectable 40 milliGRAM(s) SubCutaneous at bedtime    Neuro:  acetaminophen   Tablet. 650 milliGRAM(s) Oral every 6 hours PRN Mild Pain (1 - 3)  diazepam    Tablet 5 milliGRAM(s) Oral every 8 hours  DULoxetine 30 milliGRAM(s) Oral daily  HYDROmorphone PCA (1 mG/mL) 30 milliLiter(s) PCA Continuous PCA Continuous  HYDROmorphone PCA (1 mG/mL) Rescue Clinician Bolus 0.5 milliGRAM(s) IV Push every 15 minutes PRN for Pain Scale GREATER THAN 6  topiramate 100 milliGRAM(s) Oral every 12 hours    GI/:  docusate sodium 100 milliGRAM(s) Oral three times a day  famotidine    Tablet 20 milliGRAM(s) Oral every 12 hours PRN Dyspepsia  senna 2 Tablet(s) Oral at bedtime    Other:   influenza   Vaccine 0.5 milliLiter(s) IntraMuscular once  naloxone Injectable 0.1 milliGRAM(s) IV Push every 3 minutes PRN For ANY of the following changes in patient status:  A. RR LESS THAN 10 breaths per minute, B. Oxygen saturation LESS THAN 90%, C. Sedation score of 6  sodium chloride 0.9% with potassium chloride 20 mEq/L 1000 milliLiter(s) IV Continuous <Continuous>    DIET: [x] Regular [] CCD [] Renal [] Puree [] Dysphagia [] Tube Feeds:     IMAGING:   < from: CT Cervical Spine No Cont (02.06.18 @ 09:48) >  Interval suboccipital craniectomy and surgical removal of the posterior   C1 arch, with interval placement of posterior approach craniocervical   fusion with bilateral screws/rods involving the occipital condylar region   and C1-C2. No acute fracture or subluxation.    Expected soft tissue postsurgical changes.    < end of copied text >
Day 1 of Anesthesia Pain Management Service    SUBJECTIVE: Patient is doing well with IV PCA    Pain Scale Score:	[X] Refer to charted pain scores    THERAPY:    [ ] IV PCA Morphine		[ ] 5 mg/mL	[ ] 1 mg/mL  [X] IV PCA Hydromorphone	[ ] 5 mg/mL	[X] 1 mg/mL  [ ] IV PCA Fentanyl		[ ] 50 micrograms/mL    Demand dose: 0.2 mg     Lockout: 6 minutes   Continuous Rate: 0 mg/hr  4 Hour Limit: 4 mg    MEDICATIONS  (STANDING):  diazepam    Tablet 5 milliGRAM(s) Oral every 8 hours  docusate sodium 100 milliGRAM(s) Oral three times a day  DULoxetine 30 milliGRAM(s) Oral daily  enoxaparin Injectable 40 milliGRAM(s) SubCutaneous at bedtime  HYDROmorphone PCA (1 mG/mL) 30 milliLiter(s) PCA Continuous PCA Continuous  influenza   Vaccine 0.5 milliLiter(s) IntraMuscular once  senna 2 Tablet(s) Oral at bedtime  sodium chloride 0.9% with potassium chloride 20 mEq/L 1000 milliLiter(s) (80 mL/Hr) IV Continuous <Continuous>  topiramate 100 milliGRAM(s) Oral every 12 hours  vancomycin  IVPB 1000 milliGRAM(s) IV Intermittent every 12 hours    MEDICATIONS  (PRN):  famotidine    Tablet 20 milliGRAM(s) Oral every 12 hours PRN Dyspepsia  HYDROmorphone  Injectable 0.5 milliGRAM(s) IV Push every 10 minutes PRN Moderate Pain (4 - 6)  HYDROmorphone PCA (1 mG/mL) Rescue Clinician Bolus 0.5 milliGRAM(s) IV Push every 15 minutes PRN for Pain Scale GREATER THAN 6  naloxone Injectable 0.1 milliGRAM(s) IV Push every 3 minutes PRN For ANY of the following changes in patient status:  A. RR LESS THAN 10 breaths per minute, B. Oxygen saturation LESS THAN 90%, C. Sedation score of 6  ondansetron Injectable 4 milliGRAM(s) IV Push every 6 hours PRN Nausea      OBJECTIVE:    Sedation Score:	[ X] Alert	[ ] Drowsy 	[ ] Arousable	[ ] Asleep	[ ] Unresponsive    Side Effects:	[X ] None	[ ] Nausea	[ ] Vomiting	[ ] Pruritus  		[ ] Other:    Vital Signs Last 24 Hrs  T(C): 36 (06 Feb 2018 07:00), Max: 36.7 (05 Feb 2018 13:25)  T(F): 96.8 (06 Feb 2018 07:00), Max: 98.1 (05 Feb 2018 13:25)  HR: 99 (06 Feb 2018 08:00) (68 - 99)  BP: 102/58 (06 Feb 2018 08:00) (92/54 - 135/80)  BP(mean): 75 (06 Feb 2018 08:00) (68 - 103)  RR: 16 (06 Feb 2018 08:00) (14 - 19)  SpO2: 94% (06 Feb 2018 08:00) (94% - 100%)    ASSESSMENT/ PLAN    Therapy to  be:               [X] Continued   [ ] Discontinued   [ ] Changed to PRN Analgesics    Documentation and Verification of current medications:   [X] Done	[ ] Not done, not eligible    Comments: Using 3-4x/hr. Recommend IV Ofirmev
Day _2_ of Anesthesia Pain Management Service    SUBJECTIVE: Patient is doing well with IV PCA    Pain Scale Score:	[X] Refer to charted pain scores    THERAPY:    [ ] IV PCA Morphine		[ ] 5 mg/mL	[ ] 1 mg/mL  [X] IV PCA Hydromorphone	[ ] 5 mg/mL	[X] 1 mg/mL  [ ] IV PCA Fentanyl		[ ] 50 micrograms/mL    Demand dose: 0.25 mg     Lockout: 6 minutes   Continuous Rate: 0 mg/hr  4 Hour Limit: 4 mg    MEDICATIONS  (STANDING):  diazepam    Tablet 5 milliGRAM(s) Oral every 8 hours  docusate sodium 100 milliGRAM(s) Oral three times a day  DULoxetine 30 milliGRAM(s) Oral daily  enoxaparin Injectable 40 milliGRAM(s) SubCutaneous at bedtime  HYDROmorphone PCA (1 mG/mL) 30 milliLiter(s) PCA Continuous PCA Continuous  influenza   Vaccine 0.5 milliLiter(s) IntraMuscular once  senna 2 Tablet(s) Oral at bedtime  sodium chloride 0.9% with potassium chloride 20 mEq/L 1000 milliLiter(s) (80 mL/Hr) IV Continuous <Continuous>  topiramate 100 milliGRAM(s) Oral every 12 hours    MEDICATIONS  (PRN):  acetaminophen   Tablet. 650 milliGRAM(s) Oral every 6 hours PRN Mild Pain (1 - 3)  famotidine    Tablet 20 milliGRAM(s) Oral every 12 hours PRN Dyspepsia  HYDROmorphone PCA (1 mG/mL) Rescue Clinician Bolus 0.5 milliGRAM(s) IV Push every 15 minutes PRN for Pain Scale GREATER THAN 6  naloxone Injectable 0.1 milliGRAM(s) IV Push every 3 minutes PRN For ANY of the following changes in patient status:  A. RR LESS THAN 10 breaths per minute, B. Oxygen saturation LESS THAN 90%, C. Sedation score of 6      OBJECTIVE:    Sedation Score:	[ X] Alert	[ ] Drowsy 	[ ] Arousable	[ ] Asleep	[ ] Unresponsive    Side Effects:	[X ] None	[ ] Nausea	[ ] Vomiting	[ ] Pruritus  		[ ] Other:    Vital Signs Last 24 Hrs  T(C): 33.9 (07 Feb 2018 09:01), Max: 36.8 (06 Feb 2018 13:00)  T(F): 93 (07 Feb 2018 09:01), Max: 98.2 (06 Feb 2018 13:00)  HR: 80 (07 Feb 2018 09:01) (80 - 112)  BP: 110/68 (07 Feb 2018 09:01) (93/64 - 121/78)  BP(mean): 78 (06 Feb 2018 22:00) (70 - 78)  RR: 17 (07 Feb 2018 09:01) (14 - 18)  SpO2: 95% (07 Feb 2018 09:01) (94% - 100%)    ASSESSMENT/ PLAN    Therapy to  be:               [X] Continued   [ ] Discontinued   [ ] Changed to PRN Analgesics    Documentation and Verification of current medications:   [X] Done	[ ] Not done, not eligible    Comments:
HPI: 38 yo with a known diagnosis of Su-Danlos and Arnold Chiari malformation presenting with chest pain. She was evaluated by a cardiologist today who performed and EKG and sent her to the ED to get evaluated for her chest pain and syncope. She had a syncopal event whiile going down the stairs a week ago. At that time she went to [facility] where she was evaluated for trauma/fractures. She also had a negative stress echo on . She had within the past year a negative coronary CT angiogram and a CT of the Chest showing emphysema. She is being followed up by Dr Murray for her Chiari malformation.    PAST MEDICAL HISTORY   Tethered cord syndrome  EDS (Su-Danlos syndrome)  Chiari I malformation  Irritable bowel syndrome without diarrhea  Cervical disc disease  Low back pain  Asthma    PAST SURGICAL HISTORY   S/P myelogram  History of tonsillectomy  History of cholecystectomy  H/O:   S/P lumbar laminectomy    VITALS:  T(C): , Max: 36.7 (18 @ 05:00)  HR:  (68 - 90)  BP:  (92/54 - 135/80)  ABP:  (95/56 - 154/81)  RR:  (14 - 19)  SpO2:  (94% - 100%)  Wt(kg): --    18 @ 07:01  -  18 @ 07:00  --------------------------------------------------------  IN: 1430 mL / OUT: 0 mL / NET: 1430 mL    18 @ 07:01  -  18 @ 02:24  --------------------------------------------------------  IN: 400 mL / OUT: 415 mL / NET: -15 mL    LABS:  Na: 136 ( @ 22:37), 140 ( @ 08:34)  K: 3.7 ( @ 22:37), 4.3 (:34)  Cl: 110 ( 22:37), 107 (:34)  CO2: 17 (:37), 19 (:)  BUN: 11 ( 22:37), 14 (:34)  Cr: 0.58 (:37), 0.71 (:)  Glu: 137(:37), 103(:)    Hgb: 12.1 (:37), 14.7 (:44)  Hct: 34.7 (:), 44.3 (:44)  WBC: 9.6 (:37), 8.84 (:44)  Plt: 210 (:), 265 (:)      IMAGING:   Recent imaging studies were reviewed.    MEDICATIONS:  diazepam    Tablet 5 milliGRAM(s) Oral every 8 hours  docusate sodium 100 milliGRAM(s) Oral three times a day  enoxaparin Injectable 40 milliGRAM(s) SubCutaneous at bedtime  famotidine    Tablet 20 milliGRAM(s) Oral every 12 hours PRN  HYDROmorphone  Injectable 0.5 milliGRAM(s) IV Push every 10 minutes PRN  HYDROmorphone PCA (1 mG/mL) 30 milliLiter(s) PCA Continuous PCA Continuous  HYDROmorphone PCA (1 mG/mL) Rescue Clinician Bolus 0.5 milliGRAM(s) IV Push every 15 minutes PRN  influenza   Vaccine 0.5 milliLiter(s) IntraMuscular once  naloxone Injectable 0.1 milliGRAM(s) IV Push every 3 minutes PRN  ondansetron Injectable 4 milliGRAM(s) IV Push every 6 hours PRN  senna 2 Tablet(s) Oral at bedtime  sodium chloride 0.9% with potassium chloride 20 mEq/L 1000 milliLiter(s) IV Continuous <Continuous>  vancomycin  IVPB 1000 milliGRAM(s) IV Intermittent every 12 hours    EXAMINATION:  General:  calm  HEENT:  MMM  Neuro:  awake, alert, oriented x 3, follows commands, moves all extremities full strength, except for RLE 4+/5 at baseline   Cards:  RRR  Respiratory:  no respiratory distress  Adomen:  soft  Extremities:  no edema  Skin:  warm/dry
Pain Management Attending Addendum    SUBJECTIVE: Patient doing well with IV PCA    Therapy:    [X] IV PCA         [ ] PRN Analgesics    OBJECTIVE:   [X] Pain appropriately controlled    [ ] Other:    Side Effects:  [X] None	             [ ] Nausea              [ ] Pruritis                	[ ] Other:    ASSESSMENT/PLAN: Continue current therapy    Comments:
Pain Management Attending Addendum    SUBJECTIVE: Patient doing well with IV PCA    Therapy:    [X] IV PCA         [ ] PRN Analgesics    OBJECTIVE:   [X] Pain appropriately controlled    [ ] Other:    Side Effects:  [X] None	             [ ] Nausea              [ ] Pruritis                	[ ] Other:    ASSESSMENT/PLAN: Continue current therapy    Comments:
SUBJECTIVE: Patient seen and examined at bedside and offers no complaints at this time.     OVERNIGHT EVENTS: none     Vital Signs Last 24 Hrs  T(C): 36.4 (03 Feb 2018 09:05), Max: 36.7 (02 Feb 2018 12:05)  T(F): 97.5 (03 Feb 2018 09:05), Max: 98 (02 Feb 2018 12:05)  HR: 82 (03 Feb 2018 09:05) (80 - 113)  BP: 97/63 (03 Feb 2018 09:05) (97/63 - 132/85)  BP(mean): --  RR: 18 (03 Feb 2018 09:05) (18 - 20)  SpO2: 100% (03 Feb 2018 09:05) (98% - 100%)    PHYSICAL EXAM:    General: No Acute Distress     Neurological: Awake, alert oriented to person, place and time, Following Commands, PERRL, EOMI, Face Symmetrical, Speech Fluent, Moving all extremities, RHF 4/5 (old) No Drift, Sensation to Light Touch Intact    Pulmonary: Clear to Auscultation, No Rales, No Rhonchi, No Wheezes     Cardiovascular: S1, S2, Regular Rate and Rhythm     Gastrointestinal: Soft, Nontender, Nondistended     Extremities: No calf tenderness       LABS: no new labs        DIET: regular      IMAGING: no new imaging
SUBJECTIVE: Patient seen and examined at bedside. Reports some anxiety about surgery tomorrow. Otherwise not acute complaints.     OVERNIGHT EVENTS: none     Vital Signs Last 24 Hrs  T(C): 36.3 (04 Feb 2018 09:19), Max: 36.9 (04 Feb 2018 00:31)  T(F): 97.4 (04 Feb 2018 09:19), Max: 98.5 (04 Feb 2018 00:31)  HR: 90 (04 Feb 2018 09:19) (86 - 98)  BP: 96/65 (04 Feb 2018 09:19) (96/65 - 113/75)  BP(mean): --  RR: 18 (04 Feb 2018 09:19) (18 - 18)  SpO2: 97% (04 Feb 2018 09:19) (96% - 100%)    PHYSICAL EXAM:    General: No Acute Distress     Neurological: Awake, alert oriented to person, place and time, Following Commands, PERRL, EOMI, Face Symmetrical, Speech Fluent, Moving all extremities, RHF 4/5 (old) No Drift, Sensation to Light Touch Intact     Gastrointestinal: Soft, Nontender, Nondistended     Extremities: No calf tenderness       LABS:                         14.7   8.84  )-----------( 265      ( 04 Feb 2018 08:44 )             44.3   02-04    140  |  107  |  14  ----------------------------<  103<H>  4.3   |  19<L>  |  0.71    Ca    9.3      04 Feb 2018 08:34      PT/INR - ( 04 Feb 2018 08:44 )   PT: 10.3 sec;   INR: 0.91 ratio         PTT - ( 04 Feb 2018 08:44 )  PTT:34.4 sec    HCG Quantitative, Serum (02.04.18 @ 08:51)    HCG Quantitative, Serum: <1    DIET: Regular, NPO after MN    IMAGING: no new imaging
SUBJECTIVE: Patient was seen and evaluated at bedside. Patient is resting comfortably in bed and is in no new acute distress.     OVERNIGHT EVENTS: none     Vital Signs Last 24 Hrs  T(C): 36.7 (09 Feb 2018 05:13), Max: 37.1 (08 Feb 2018 21:00)  T(F): 98 (09 Feb 2018 05:13), Max: 98.8 (08 Feb 2018 21:00)  HR: 99 (09 Feb 2018 05:13) (99 - 109)  BP: 124/70 (09 Feb 2018 05:13) (112/75 - 124/70)  BP(mean): --  RR: 18 (09 Feb 2018 05:13) (16 - 18)  SpO2: 98% (09 Feb 2018 05:13) (97% - 99%)    PHYSICAL EXAM:    General: No Acute Distress     Neurological: Awake, alert oriented to person, place and time, Following Commands, PERRL, EOMI, Face Symmetrical, Speech Fluent, Moving all extremities, Muscle Strength normal in all four extremities, No Drift, Sensation to Light Touch Intact    Pulmonary: Clear to Auscultation, No Rales, No Rhonchi, No Wheezes     Cardiovascular: S1, S2, Regular Rate and Rhythm     Gastrointestinal: Soft, Nontender, Nondistended     Extremities: No calf tenderness     Incision: clean and dry     LABS:                        10.1   7.45  )-----------( 245      ( 09 Feb 2018 07:54 )             32.5    02-09    140  |  110<H>  |  9   ----------------------------<  84  3.4<L>   |  18<L>  |  0.33<L>    Ca    8.2<L>      09 Feb 2018 07:45          02-08 @ 07:01  -  02-09 @ 07:00  --------------------------------------------------------  IN: 900 mL / OUT: 21 mL / NET: 879 mL      DRAINS:     MEDICATIONS:  Antibiotics:    Neuro:  acetaminophen   Tablet. 650 milliGRAM(s) Oral every 6 hours PRN Mild Pain (1 - 3)  cyclobenzaprine 5 milliGRAM(s) Oral three times a day PRN Muscle Spasm  diazepam    Tablet 5 milliGRAM(s) Oral every 8 hours  DULoxetine 30 milliGRAM(s) Oral daily  HYDROmorphone   Tablet 2 milliGRAM(s) Oral every 4 hours PRN Moderate Pain (4 - 6)  HYDROmorphone   Tablet 4 milliGRAM(s) Oral every 4 hours PRN Severe Pain (7 - 10)  topiramate 100 milliGRAM(s) Oral every 12 hours    Cardiac:    Pulm:    GI/:  docusate sodium 100 milliGRAM(s) Oral three times a day  famotidine    Tablet 20 milliGRAM(s) Oral every 12 hours PRN Dyspepsia  polyethylene glycol 3350 17 Gram(s) Oral two times a day  senna 2 Tablet(s) Oral at bedtime    Other:   enoxaparin Injectable 40 milliGRAM(s) SubCutaneous at bedtime  influenza   Vaccine 0.5 milliLiter(s) IntraMuscular once  naloxone Injectable 0.1 milliGRAM(s) IV Push every 3 minutes PRN For ANY of the following changes in patient status:  A. RR LESS THAN 10 breaths per minute, B. Oxygen saturation LESS THAN 90%, C. Sedation score of 6    DIET: [] Regular [] CCD [] Renal [] Puree [] Dysphagia [] Tube Feeds: regular     IMAGING: no new imaging
SUBJECTIVE: Patient was seen and evaluated at bedside. Patient is resting comfortably in bed and is in no new acute distress.     OVERNIGHT EVENTS: none     Vital Signs Last 24 Hrs  T(C): 36.9 (08 Feb 2018 08:41), Max: 37.1 (08 Feb 2018 04:40)  T(F): 98.5 (08 Feb 2018 08:41), Max: 98.8 (08 Feb 2018 04:40)  HR: 109 (08 Feb 2018 08:41) (66 - 109)  BP: 109/75 (08 Feb 2018 08:41) (105/69 - 130/83)  BP(mean): --  RR: 20 (08 Feb 2018 08:41) (16 - 20)  SpO2: 98% (08 Feb 2018 08:41) (94% - 100%)    PHYSICAL EXAM:    General: No Acute Distress     Neurological: Awake, alert oriented to person, place and time, Following Commands, PERRL, EOMI, Face Symmetrical, Speech Fluent, Moving all extremities, Muscle Strength normal in all four extremities, No Drift, Sensation to Light Touch Intact    Pulmonary: Clear to Auscultation, No Rales, No Rhonchi, No Wheezes     Cardiovascular: S1, S2, Regular Rate and Rhythm     Gastrointestinal: Soft, Nontender, Nondistended     Extremities: No calf tenderness     Incision: clean and dry     LABS:                        11.8   11.5  )-----------( 186      ( 06 Feb 2018 22:14 )             34.0    02-06    139  |  109<H>  |  12  ----------------------------<  123<H>  4.1   |  24  |  0.51    Ca    8.2<L>      06 Feb 2018 22:14  Phos  2.5     02-06  Mg     1.8     02-06    PT/INR - ( 07 Feb 2018 06:36 )   PT: 11.5 sec;   INR: 1.05 ratio         PTT - ( 07 Feb 2018 06:36 )  PTT:30.4 sec      02-07 @ 07:01  -  02-08 @ 07:00  --------------------------------------------------------  IN: 820 mL / OUT: 75 mL / NET: 745 mL      DRAINS:     MEDICATIONS:  Antibiotics:    Neuro:  acetaminophen   Tablet. 650 milliGRAM(s) Oral every 6 hours PRN Mild Pain (1 - 3)  cyclobenzaprine 5 milliGRAM(s) Oral three times a day PRN Muscle Spasm  diazepam    Tablet 5 milliGRAM(s) Oral every 8 hours  DULoxetine 30 milliGRAM(s) Oral daily  HYDROmorphone   Tablet 2 milliGRAM(s) Oral every 4 hours PRN Moderate Pain (4 - 6)  HYDROmorphone   Tablet 4 milliGRAM(s) Oral every 4 hours PRN Severe Pain (7 - 10)  HYDROmorphone  Injectable 0.5 milliGRAM(s) IV Push once  topiramate 100 milliGRAM(s) Oral every 12 hours    Cardiac:    Pulm:    GI/:  docusate sodium 100 milliGRAM(s) Oral three times a day  famotidine    Tablet 20 milliGRAM(s) Oral every 12 hours PRN Dyspepsia  senna 2 Tablet(s) Oral at bedtime    Other:   enoxaparin Injectable 40 milliGRAM(s) SubCutaneous at bedtime  influenza   Vaccine 0.5 milliLiter(s) IntraMuscular once  naloxone Injectable 0.1 milliGRAM(s) IV Push every 3 minutes PRN For ANY of the following changes in patient status:  A. RR LESS THAN 10 breaths per minute, B. Oxygen saturation LESS THAN 90%, C. Sedation score of 6    DIET: [] Regular [] CCD [] Renal [] Puree [] Dysphagia [] Tube Feeds: regular     IMAGING: mri brain :p
SUBJECTIVE: Pt seen and examined, sitting up in bed, asking to go off floor with , reports feeling anxious about waiting for surgery     OVERNIGHT EVENTS: none    Vital Signs Last 24 Hrs  T(C): 36.7 (02 Feb 2018 12:05), Max: 36.9 (01 Feb 2018 15:55)  T(F): 98 (02 Feb 2018 12:05), Max: 98.5 (01 Feb 2018 15:55)  HR: 80 (02 Feb 2018 12:05) (74 - 101)  BP: 103/68 (02 Feb 2018 12:05) (87/54 - 121/84)  BP(mean): --  RR: 20 (02 Feb 2018 12:05) (18 - 20)  SpO2: 98% (02 Feb 2018 12:05) (95% - 98%)    PHYSICAL EXAM:    General: No Acute Distress     Neurological: Awake, alert oriented to person, place and time, Following Commands, PERRL, EOMI, Face Symmetrical, Speech Fluent, Moving all extremities, right HF 4/5 (old) otherwise intact,  No Drift, Sensation to Light Touch Intact    Pulmonary: Clear to Auscultation, No Rales, No Rhonchi, No Wheezes     Cardiovascular: S1, S2, Regular Rate and Rhythm     Gastrointestinal: Soft, Nontender, Nondistended     Extremities: No calf tenderness     Incision: none    LABS:                        14.9   9.3   )-----------( 267      ( 31 Jan 2018 13:56 )             42.6    01-31    140  |  107  |  11  ----------------------------<  93  4.1   |  24  |  0.75    Ca    9.3      31 Jan 2018 13:56    TPro  6.8  /  Alb  4.0  /  TBili  0.3  /  DBili  x   /  AST  14  /  ALT  12  /  AlkPhos  73  01-31 02-01 @ 07:01  -  02-02 @ 07:00  --------------------------------------------------------  IN: 1720 mL / OUT: 1 mL / NET: 1719 mL    02-02 @ 07:01 - 02-02 @ 12:41  --------------------------------------------------------  IN: 400 mL / OUT: 0 mL / NET: 400 mL      DRAINS: none    MEDICATIONS:  Antibiotics:    Neuro:  acetaminophen   Tablet. 650 milliGRAM(s) Oral every 6 hours PRN Mild Pain (1 - 3)  ALPRAZolam 0.5 milliGRAM(s) Oral every 8 hours PRN anxiety  cyclobenzaprine 10 milliGRAM(s) Oral three times a day PRN Muscle Spasm  DULoxetine 30 milliGRAM(s) Oral daily  HYDROmorphone   Tablet 2 milliGRAM(s) Oral every 4 hours PRN Moderate Pain (4 - 6)  ondansetron   Disintegrating Tablet 4 milliGRAM(s) Oral every 6 hours PRN Nausea  topiramate 100 milliGRAM(s) Oral two times a day    Cardiac:  metoprolol     tartrate 12.5 milliGRAM(s) Oral at bedtime    Pulm:    GI/:  bisacodyl 5 milliGRAM(s) Oral daily PRN Constipation  senna 2 Tablet(s) Oral at bedtime PRN Constipation    Other:   enoxaparin Injectable 40 milliGRAM(s) SubCutaneous every 24 hours  influenza   Vaccine 0.5 milliLiter(s) IntraMuscular once    DIET: [x] Regular [] CCD [] Renal [] Puree [] Dysphagia [] Tube Feeds:     IMAGING:   < from: MR Cervical Spine No Cont (01.31.18 @ 17:29) >  COMPARISON: No prior MRI examinations of the cervical spine are available   for comparison. Comparison is made with a prior cervical spine CT   examination dated 1/25/2018. Comparison is made to a prior MRI   examination of the brain from 9/21/2017.    FINDINGS: There is redemonstration of cerebellar tonsillar ectopia   measuring up to 7 mm. The cerebellar tonsils have a pointed   configuration. No syrinx is notable within the cervicalcord. There is   minimal retroflexion of the dens with the medulla minimally draping over   the craniovertebral junction. Findings appear unchanged when compared to   the prior MRI examination of the brain. There is no hydrocephalus.    On flexion images: The clivoaxial angle measures 136. The pB C2 line   measures 6 mm.      On neutral images: The clivoaxial angle measures 142 degrees. The pB C2   line measures 5.5 mm.      On extension images: The clivoaxial angle measures 164 degrees. The pB C2   line measures 4 mm.      IMPRESSION: Redemonstration of a Chiari I malformation with associated   platybasia. No associated cervical cord syrinx.    Clivoaxial and pB-C2 measurements in flexion, neutral, extension   positions, as detailed.    < end of copied text >
Subjective: Patient seen and examined. No new events except as noted.     SUBJECTIVE/ROS:  No chest pain, dyspnea, palpitation, or dizziness.       MEDICATIONS:  MEDICATIONS  (STANDING):  DULoxetine 30 milliGRAM(s) Oral daily  enoxaparin Injectable 40 milliGRAM(s) SubCutaneous every 24 hours  famotidine    Tablet 20 milliGRAM(s) Oral daily  influenza   Vaccine 0.5 milliLiter(s) IntraMuscular once  metoprolol     tartrate 12.5 milliGRAM(s) Oral at bedtime  topiramate 100 milliGRAM(s) Oral two times a day      PHYSICAL EXAM:  T(C): 36.4 (02-03-18 @ 09:05), Max: 36.7 (02-02-18 @ 16:05)  HR: 82 (02-03-18 @ 09:05) (82 - 113)  BP: 97/63 (02-03-18 @ 09:05) (97/63 - 132/85)  RR: 18 (02-03-18 @ 09:05) (18 - 18)  SpO2: 100% (02-03-18 @ 09:05) (99% - 100%)  Wt(kg): --  I&O's Summary    02 Feb 2018 07:01  -  03 Feb 2018 07:00  --------------------------------------------------------  IN: 1830 mL / OUT: 0 mL / NET: 1830 mL          Appearance: Normal	  HEENT:   Normal oral mucosa, PERRL, EOMI	  Cardiovascular: Normal S1 S2,    Murmur:   Neck: JVP normal  Respiratory: Lungs clear to auscultation  Gastrointestinal:  Soft, Non-tender, + BS	  Skin: normal   Neuro: No gross deficits.   Psychiatry:  Mood & affect appropriate  Ext: No edema      LABS/DATA:    CARDIAC MARKERS:  CARDIAC MARKERS ( 31 Jan 2018 13:56 )  x     / <0.01 ng/mL / 78 U/L / x     / 1.8 ng/mL                  proBNP:   Lipid Profile:   HgA1c:   TSH:     TELE:  EKG:
Subjective: Patient seen and examined. No new events except as noted.     SUBJECTIVE/ROS:  No chest pain, dyspnea, palpitation, or dizziness.       MEDICATIONS:  MEDICATIONS  (STANDING):  DULoxetine 30 milliGRAM(s) Oral daily  enoxaparin Injectable 40 milliGRAM(s) SubCutaneous every 24 hours  influenza   Vaccine 0.5 milliLiter(s) IntraMuscular once  metoprolol     tartrate 12.5 milliGRAM(s) Oral at bedtime  topiramate 100 milliGRAM(s) Oral two times a day      PHYSICAL EXAM:  T(C): 36.7 (02-02-18 @ 16:05), Max: 36.8 (02-01-18 @ 20:01)  HR: 83 (02-02-18 @ 16:05) (74 - 101)  BP: 108/69 (02-02-18 @ 16:05) (87/54 - 115/73)  RR: 18 (02-02-18 @ 16:05) (18 - 20)  SpO2: 99% (02-02-18 @ 16:05) (97% - 99%)  Wt(kg): --  I&O's Summary    01 Feb 2018 07:01  -  02 Feb 2018 07:00  --------------------------------------------------------  IN: 1720 mL / OUT: 1 mL / NET: 1719 mL    02 Feb 2018 07:01  -  02 Feb 2018 17:30  --------------------------------------------------------  IN: 800 mL / OUT: 0 mL / NET: 800 mL    < from: Transthoracic Echocardiogram (02.02.18 @ 09:17) >  1. Increased relative wall thickness with normal left  ventricular mass index, consistent with concentric left  ventricular remodeling.  2. Normal left ventricular systolic function. No segmental  wall motion abnormalities.  *** No previous Echo exam.    < end of copied text >        Appearance: Normal	  HEENT:   Normal oral mucosa, PERRL, EOMI	  Cardiovascular: Normal S1 S2,    Murmur:   Neck: JVP normal  Respiratory: Lungs clear to auscultation  Gastrointestinal:  Soft, Non-tender, + BS	  Skin: normal   Neuro: No gross deficits.   Psychiatry:  Mood & affect appropriate  Ext: No edema      LABS/DATA:    CARDIAC MARKERS:  CARDIAC MARKERS ( 31 Jan 2018 13:56 )  x     / <0.01 ng/mL / 78 U/L / x     / 1.8 ng/mL                  proBNP:   Lipid Profile:   HgA1c:   TSH:     TELE:  EKG:
Subjective: Patient seen and examined. No new events except as noted.     SUBJECTIVE/ROS:  No chest pain, dyspnea, palpitation, or dizziness.       MEDICATIONS:  MEDICATIONS  (STANDING):  DULoxetine 30 milliGRAM(s) Oral daily  famotidine    Tablet 20 milliGRAM(s) Oral daily  influenza   Vaccine 0.5 milliLiter(s) IntraMuscular once  metoprolol     tartrate 12.5 milliGRAM(s) Oral at bedtime  sodium chloride 0.9% with potassium chloride 20 mEq/L 1000 milliLiter(s) (75 mL/Hr) IV Continuous <Continuous>  topiramate 100 milliGRAM(s) Oral two times a day      PHYSICAL EXAM:  T(C): 36.6 (02-05-18 @ 08:06), Max: 36.7 (02-04-18 @ 14:16)  HR: 77 (02-05-18 @ 05:00) (77 - 106)  BP: 95/66 (02-05-18 @ 05:00) (95/66 - 123/85)  RR: 18 (02-05-18 @ 08:06) (18 - 18)  SpO2: 100% (02-05-18 @ 08:06) (97% - 100%)  Wt(kg): --  I&O's Summary    04 Feb 2018 07:01  -  05 Feb 2018 07:00  --------------------------------------------------------  IN: 1430 mL / OUT: 0 mL / NET: 1430 mL      Height (cm): 157.48 (02-05 @ 07:37)  Weight (kg): 91.8 (02-05 @ 07:33)  BMI (kg/m2): 37 (02-05 @ 07:37)  BSA (m2): 1.92 (02-05 @ 07:37)    Appearance: Normal	  HEENT:   Normal oral mucosa, PERRL, EOMI	  Cardiovascular: Normal S1 S2,    Murmur:   Neck: JVP normal  Respiratory: Lungs clear to auscultation  Gastrointestinal:  Soft, Non-tender, + BS	  Skin: normal   Neuro: No gross deficits.   Psychiatry:  Mood & affect appropriate  Ext: No edema      LABS/DATA:    CARDIAC MARKERS:                                14.7   8.84  )-----------( 265      ( 04 Feb 2018 08:44 )             44.3     02-04    140  |  107  |  14  ----------------------------<  103<H>  4.3   |  19<L>  |  0.71    Ca    9.3      04 Feb 2018 08:34      proBNP:   Lipid Profile:   HgA1c:   TSH:     TELE:  EKG:
Subjective: Patient seen and examined. No new events except as noted.     SUBJECTIVE/ROS:  No chest pain, dyspnea, palpitation, or dizziness.       MEDICATIONS:  MEDICATIONS  (STANDING):  diazepam    Tablet 5 milliGRAM(s) Oral every 8 hours  docusate sodium 100 milliGRAM(s) Oral three times a day  DULoxetine 30 milliGRAM(s) Oral daily  enoxaparin Injectable 40 milliGRAM(s) SubCutaneous at bedtime  influenza   Vaccine 0.5 milliLiter(s) IntraMuscular once  polyethylene glycol 3350 17 Gram(s) Oral two times a day  senna 2 Tablet(s) Oral at bedtime  topiramate 100 milliGRAM(s) Oral every 12 hours      PHYSICAL EXAM:  T(C): 36.7 (02-09-18 @ 07:56), Max: 37.1 (02-08-18 @ 21:00)  HR: 87 (02-09-18 @ 07:56) (87 - 109)  BP: 117/75 (02-09-18 @ 07:56) (116/78 - 124/70)  RR: 18 (02-09-18 @ 07:56) (16 - 18)  SpO2: 99% (02-09-18 @ 07:56) (97% - 99%)  Wt(kg): --  I&O's Summary    08 Feb 2018 07:01  -  09 Feb 2018 07:00  --------------------------------------------------------  IN: 900 mL / OUT: 21 mL / NET: 879 mL    09 Feb 2018 07:01  -  09 Feb 2018 13:58  --------------------------------------------------------  IN: 280 mL / OUT: 0 mL / NET: 280 mL          Appearance: Normal	  HEENT:   Normal oral mucosa, PERRL, EOMI	  Cardiovascular: Normal S1 S2,    Murmur:   Neck: JVP normal  Respiratory: Lungs clear to auscultation  Gastrointestinal:  Soft, Non-tender, + BS	  Skin: normal   Neuro: No gross deficits.   Psychiatry:  Mood & affect appropriate  Ext: No edema      LABS/DATA:    CARDIAC MARKERS:                                10.1   7.45  )-----------( 245      ( 09 Feb 2018 07:54 )             32.5     02-09    140  |  110<H>  |  9   ----------------------------<  84  3.4<L>   |  18<L>  |  0.33<L>    Ca    8.2<L>      09 Feb 2018 07:45      proBNP:   Lipid Profile:   HgA1c:   TSH:     TELE:  EKG:
Subjective: Patient seen and examined. No new events except as noted.     SUBJECTIVE/ROS:  No chest pain, dyspnea, palpitation, or dizziness.       MEDICATIONS:  MEDICATIONS  (STANDING):  diazepam    Tablet 5 milliGRAM(s) Oral every 8 hours  docusate sodium 100 milliGRAM(s) Oral three times a day  DULoxetine 30 milliGRAM(s) Oral daily  enoxaparin Injectable 40 milliGRAM(s) SubCutaneous at bedtime  influenza   Vaccine 0.5 milliLiter(s) IntraMuscular once  polyethylene glycol 3350 17 Gram(s) Oral two times a day  senna 2 Tablet(s) Oral at bedtime  topiramate 100 milliGRAM(s) Oral every 12 hours      PHYSICAL EXAM:  T(C): 36.9 (02-08-18 @ 16:02), Max: 37.1 (02-08-18 @ 04:40)  HR: 101 (02-08-18 @ 16:02) (96 - 109)  BP: 118/79 (02-08-18 @ 16:02) (109/64 - 130/83)  RR: 16 (02-08-18 @ 16:02) (16 - 20)  SpO2: 97% (02-08-18 @ 16:02) (94% - 98%)  Wt(kg): --  I&O's Summary    07 Feb 2018 07:01  -  08 Feb 2018 07:00  --------------------------------------------------------  IN: 820 mL / OUT: 75 mL / NET: 745 mL    08 Feb 2018 07:01  -  08 Feb 2018 16:32  --------------------------------------------------------  IN: 400 mL / OUT: 1 mL / NET: 399 mL        JVP: Normal  Neck: supple  Lung: clear   CV: S1 S2 , Murmur:  Abd: soft  Ext: No edema  neuro: Awake / alert  Psych: flat affect  Skin: normal     LABS/DATA:    CARDIAC MARKERS:                                11.8   11.5  )-----------( 186      ( 06 Feb 2018 22:14 )             34.0     02-06    139  |  109<H>  |  12  ----------------------------<  123<H>  4.1   |  24  |  0.51    Ca    8.2<L>      06 Feb 2018 22:14  Phos  2.5     02-06  Mg     1.8     02-06      proBNP:   Lipid Profile:   HgA1c:   TSH:     TELE:  EKG:
Subjective: Patient seen and examined. No new events except as noted.     SUBJECTIVE/ROS:  feels well       MEDICATIONS:  MEDICATIONS  (STANDING):  DULoxetine 30 milliGRAM(s) Oral daily  famotidine    Tablet 20 milliGRAM(s) Oral daily  influenza   Vaccine 0.5 milliLiter(s) IntraMuscular once  metoprolol     tartrate 12.5 milliGRAM(s) Oral at bedtime  sodium chloride 0.9% with potassium chloride 20 mEq/L 1000 milliLiter(s) (75 mL/Hr) IV Continuous <Continuous>  topiramate 100 milliGRAM(s) Oral two times a day      PHYSICAL EXAM:  T(C): 36.3 (02-04-18 @ 09:19), Max: 36.9 (02-04-18 @ 00:31)  HR: 90 (02-04-18 @ 09:19) (86 - 98)  BP: 96/65 (02-04-18 @ 09:19) (96/65 - 113/75)  RR: 18 (02-04-18 @ 09:19) (18 - 18)  SpO2: 97% (02-04-18 @ 09:19) (96% - 100%)  Wt(kg): --  I&O's Summary    03 Feb 2018 07:01  -  04 Feb 2018 07:00  --------------------------------------------------------  IN: 1750 mL / OUT: 0 mL / NET: 1750 mL          Appearance: Normal	  HEENT:   Normal oral mucosa, PERRL, EOMI	  Cardiovascular: Normal S1 S2,    Murmur:   Neck: JVP normal  Respiratory: Lungs clear to auscultation  Gastrointestinal:  Soft, Non-tender, + BS	  Skin: normal   Neuro: No gross deficits.   Psychiatry:  Mood & affect appropriate  Ext: No edema      LABS/DATA:    CARDIAC MARKERS:                                14.7   8.84  )-----------( 265      ( 04 Feb 2018 08:44 )             44.3     02-04    140  |  107  |  14  ----------------------------<  103<H>  4.3   |  19<L>  |  0.71    Ca    9.3      04 Feb 2018 08:34      proBNP:   Lipid Profile:   HgA1c:   TSH:     TELE:  EKG:
Subjective: Patient seen and examined. No new events except as noted.     SUBJECTIVE/ROS:  seen in PACU  No chest pain, dyspnea, palpitation, or dizziness.       MEDICATIONS:  MEDICATIONS  (STANDING):  diazepam    Tablet 5 milliGRAM(s) Oral every 8 hours  docusate sodium 100 milliGRAM(s) Oral three times a day  DULoxetine 30 milliGRAM(s) Oral daily  enoxaparin Injectable 40 milliGRAM(s) SubCutaneous at bedtime  HYDROmorphone PCA (1 mG/mL) 30 milliLiter(s) PCA Continuous PCA Continuous  influenza   Vaccine 0.5 milliLiter(s) IntraMuscular once  senna 2 Tablet(s) Oral at bedtime  sodium chloride 0.9% with potassium chloride 20 mEq/L 1000 milliLiter(s) (80 mL/Hr) IV Continuous <Continuous>  topiramate 100 milliGRAM(s) Oral every 12 hours  vancomycin  IVPB 1000 milliGRAM(s) IV Intermittent every 12 hours      PHYSICAL EXAM:  T(C): 36.3 (02-06-18 @ 14:00), Max: 36.8 (02-06-18 @ 13:00)  HR: 95 (02-06-18 @ 16:00) (68 - 112)  BP: 95/58 (02-06-18 @ 16:00) (92/54 - 135/80)  RR: 17 (02-06-18 @ 16:00) (14 - 19)  SpO2: 98% (02-06-18 @ 16:00) (94% - 100%)  Wt(kg): --  I&O's Summary    05 Feb 2018 07:01  -  06 Feb 2018 07:00  --------------------------------------------------------  IN: 1190 mL / OUT: 605 mL / NET: 585 mL    06 Feb 2018 07:01  -  06 Feb 2018 16:41  --------------------------------------------------------  IN: 1110 mL / OUT: 280 mL / NET: 830 mL          Appearance: Normal	  HEENT:   Normal oral mucosa, PERRL, EOMI	  Cardiovascular: Normal S1 S2,    Murmur:   Neck: JVP normal  Respiratory: Lungs clear to auscultation  Gastrointestinal:  Soft, Non-tender, + BS	  Skin: normal   Neuro: No gross deficits.   Psychiatry:  Mood & affect appropriate  Ext: No edema      LABS/DATA:    CARDIAC MARKERS:                                12.1   9.6   )-----------( 210      ( 05 Feb 2018 22:37 )             34.7     02-05    136  |  110<H>  |  11  ----------------------------<  137<H>  3.7   |  17<L>  |  0.58    Ca    7.5<L>      05 Feb 2018 22:37  Phos  3.6     02-05  Mg     1.8     02-05      proBNP:   Lipid Profile:   HgA1c:   TSH:     TELE:  EKG:

## 2018-02-13 ENCOUNTER — EMERGENCY (EMERGENCY)
Facility: HOSPITAL | Age: 38
LOS: 1 days | Discharge: HOME | End: 2018-02-13
Attending: EMERGENCY MEDICINE
Payer: MEDICARE

## 2018-02-13 VITALS
SYSTOLIC BLOOD PRESSURE: 127 MMHG | OXYGEN SATURATION: 98 % | HEART RATE: 83 BPM | RESPIRATION RATE: 14 BRPM | TEMPERATURE: 98 F | DIASTOLIC BLOOD PRESSURE: 84 MMHG

## 2018-02-13 VITALS
TEMPERATURE: 98 F | SYSTOLIC BLOOD PRESSURE: 142 MMHG | DIASTOLIC BLOOD PRESSURE: 83 MMHG | HEART RATE: 112 BPM | RESPIRATION RATE: 20 BRPM | OXYGEN SATURATION: 100 % | HEIGHT: 62 IN | WEIGHT: 179.9 LBS

## 2018-02-13 DIAGNOSIS — Z98.89 OTHER SPECIFIED POSTPROCEDURAL STATES: Chronic | ICD-10-CM

## 2018-02-13 DIAGNOSIS — Z98.890 OTHER SPECIFIED POSTPROCEDURAL STATES: Chronic | ICD-10-CM

## 2018-02-13 LAB
ALBUMIN SERPL ELPH-MCNC: 3.7 G/DL — SIGNIFICANT CHANGE UP (ref 3.3–5)
ALP SERPL-CCNC: 132 U/L — HIGH (ref 40–120)
ALT FLD-CCNC: 67 U/L RC — HIGH (ref 10–45)
ANION GAP SERPL CALC-SCNC: 15 MMOL/L — SIGNIFICANT CHANGE UP (ref 5–17)
AST SERPL-CCNC: 16 U/L — SIGNIFICANT CHANGE UP (ref 10–40)
BASOPHILS # BLD AUTO: 0 K/UL — SIGNIFICANT CHANGE UP (ref 0–0.2)
BASOPHILS NFR BLD AUTO: 0.5 % — SIGNIFICANT CHANGE UP (ref 0–2)
BILIRUB SERPL-MCNC: 0.2 MG/DL — SIGNIFICANT CHANGE UP (ref 0.2–1.2)
BLD GP AB SCN SERPL QL: NEGATIVE — SIGNIFICANT CHANGE UP
BUN SERPL-MCNC: 6 MG/DL — LOW (ref 7–23)
CALCIUM SERPL-MCNC: 9.1 MG/DL — SIGNIFICANT CHANGE UP (ref 8.4–10.5)
CHLORIDE SERPL-SCNC: 103 MMOL/L — SIGNIFICANT CHANGE UP (ref 96–108)
CO2 SERPL-SCNC: 23 MMOL/L — SIGNIFICANT CHANGE UP (ref 22–31)
CREAT SERPL-MCNC: 0.59 MG/DL — SIGNIFICANT CHANGE UP (ref 0.5–1.3)
EOSINOPHIL # BLD AUTO: 0.2 K/UL — SIGNIFICANT CHANGE UP (ref 0–0.5)
EOSINOPHIL NFR BLD AUTO: 2.6 % — SIGNIFICANT CHANGE UP (ref 0–6)
ERYTHROCYTE [SEDIMENTATION RATE] IN BLOOD: 28 MM/HR — HIGH (ref 0–15)
GAS PNL BLDV: SIGNIFICANT CHANGE UP
GLUCOSE SERPL-MCNC: 86 MG/DL — SIGNIFICANT CHANGE UP (ref 70–99)
HCT VFR BLD CALC: 34.9 % — SIGNIFICANT CHANGE UP (ref 34.5–45)
HGB BLD-MCNC: 12.1 G/DL — SIGNIFICANT CHANGE UP (ref 11.5–15.5)
INR BLD: 1.07 RATIO — SIGNIFICANT CHANGE UP (ref 0.88–1.16)
LYMPHOCYTES # BLD AUTO: 2.3 K/UL — SIGNIFICANT CHANGE UP (ref 1–3.3)
LYMPHOCYTES # BLD AUTO: 24 % — SIGNIFICANT CHANGE UP (ref 13–44)
MCHC RBC-ENTMCNC: 32.6 PG — SIGNIFICANT CHANGE UP (ref 27–34)
MCHC RBC-ENTMCNC: 34.7 GM/DL — SIGNIFICANT CHANGE UP (ref 32–36)
MCV RBC AUTO: 93.7 FL — SIGNIFICANT CHANGE UP (ref 80–100)
MONOCYTES # BLD AUTO: 0.6 K/UL — SIGNIFICANT CHANGE UP (ref 0–0.9)
MONOCYTES NFR BLD AUTO: 6.4 % — SIGNIFICANT CHANGE UP (ref 2–14)
NEUTROPHILS # BLD AUTO: 6.4 K/UL — SIGNIFICANT CHANGE UP (ref 1.8–7.4)
NEUTROPHILS NFR BLD AUTO: 66.5 % — SIGNIFICANT CHANGE UP (ref 43–77)
PLATELET # BLD AUTO: 345 K/UL — SIGNIFICANT CHANGE UP (ref 150–400)
POTASSIUM SERPL-MCNC: 3.2 MMOL/L — LOW (ref 3.5–5.3)
POTASSIUM SERPL-SCNC: 3.2 MMOL/L — LOW (ref 3.5–5.3)
PROT SERPL-MCNC: 6.9 G/DL — SIGNIFICANT CHANGE UP (ref 6–8.3)
PROTHROM AB SERPL-ACNC: 11.6 SEC — SIGNIFICANT CHANGE UP (ref 9.8–12.7)
RBC # BLD: 3.73 M/UL — LOW (ref 3.8–5.2)
RBC # FLD: 12.1 % — SIGNIFICANT CHANGE UP (ref 10.3–14.5)
RH IG SCN BLD-IMP: POSITIVE — SIGNIFICANT CHANGE UP
SODIUM SERPL-SCNC: 141 MMOL/L — SIGNIFICANT CHANGE UP (ref 135–145)
WBC # BLD: 9.7 K/UL — SIGNIFICANT CHANGE UP (ref 3.8–10.5)
WBC # FLD AUTO: 9.7 K/UL — SIGNIFICANT CHANGE UP (ref 3.8–10.5)

## 2018-02-13 PROCEDURE — 84295 ASSAY OF SERUM SODIUM: CPT

## 2018-02-13 PROCEDURE — 87040 BLOOD CULTURE FOR BACTERIA: CPT

## 2018-02-13 PROCEDURE — 85652 RBC SED RATE AUTOMATED: CPT

## 2018-02-13 PROCEDURE — 72125 CT NECK SPINE W/O DYE: CPT | Mod: 26

## 2018-02-13 PROCEDURE — 96375 TX/PRO/DX INJ NEW DRUG ADDON: CPT

## 2018-02-13 PROCEDURE — 82435 ASSAY OF BLOOD CHLORIDE: CPT

## 2018-02-13 PROCEDURE — 70450 CT HEAD/BRAIN W/O DYE: CPT | Mod: 26

## 2018-02-13 PROCEDURE — 70450 CT HEAD/BRAIN W/O DYE: CPT

## 2018-02-13 PROCEDURE — 82330 ASSAY OF CALCIUM: CPT

## 2018-02-13 PROCEDURE — 86850 RBC ANTIBODY SCREEN: CPT

## 2018-02-13 PROCEDURE — 99284 EMERGENCY DEPT VISIT MOD MDM: CPT | Mod: 25

## 2018-02-13 PROCEDURE — 82947 ASSAY GLUCOSE BLOOD QUANT: CPT

## 2018-02-13 PROCEDURE — 82803 BLOOD GASES ANY COMBINATION: CPT

## 2018-02-13 PROCEDURE — 86901 BLOOD TYPING SEROLOGIC RH(D): CPT

## 2018-02-13 PROCEDURE — 72125 CT NECK SPINE W/O DYE: CPT

## 2018-02-13 PROCEDURE — 96374 THER/PROPH/DIAG INJ IV PUSH: CPT

## 2018-02-13 PROCEDURE — 85610 PROTHROMBIN TIME: CPT

## 2018-02-13 PROCEDURE — 83605 ASSAY OF LACTIC ACID: CPT

## 2018-02-13 PROCEDURE — 99284 EMERGENCY DEPT VISIT MOD MDM: CPT

## 2018-02-13 PROCEDURE — 86900 BLOOD TYPING SEROLOGIC ABO: CPT

## 2018-02-13 PROCEDURE — 85014 HEMATOCRIT: CPT

## 2018-02-13 PROCEDURE — 85027 COMPLETE CBC AUTOMATED: CPT

## 2018-02-13 PROCEDURE — 84132 ASSAY OF SERUM POTASSIUM: CPT

## 2018-02-13 PROCEDURE — 80053 COMPREHEN METABOLIC PANEL: CPT

## 2018-02-13 RX ORDER — ACETAMINOPHEN 500 MG
1000 TABLET ORAL ONCE
Qty: 0 | Refills: 0 | Status: COMPLETED | OUTPATIENT
Start: 2018-02-13 | End: 2018-02-13

## 2018-02-13 RX ORDER — DIAZEPAM 5 MG
5 TABLET ORAL ONCE
Qty: 0 | Refills: 0 | Status: DISCONTINUED | OUTPATIENT
Start: 2018-02-13 | End: 2018-02-13

## 2018-02-13 RX ORDER — MORPHINE SULFATE 50 MG/1
4 CAPSULE, EXTENDED RELEASE ORAL ONCE
Qty: 0 | Refills: 0 | Status: DISCONTINUED | OUTPATIENT
Start: 2018-02-13 | End: 2018-02-13

## 2018-02-13 RX ORDER — POTASSIUM CHLORIDE 20 MEQ
40 PACKET (EA) ORAL ONCE
Qty: 0 | Refills: 0 | Status: COMPLETED | OUTPATIENT
Start: 2018-02-13 | End: 2018-02-13

## 2018-02-13 RX ORDER — SODIUM CHLORIDE 9 MG/ML
1000 INJECTION INTRAMUSCULAR; INTRAVENOUS; SUBCUTANEOUS ONCE
Qty: 0 | Refills: 0 | Status: COMPLETED | OUTPATIENT
Start: 2018-02-13 | End: 2018-02-13

## 2018-02-13 RX ADMIN — Medication 40 MILLIEQUIVALENT(S): at 13:35

## 2018-02-13 RX ADMIN — Medication 1000 MILLIGRAM(S): at 13:22

## 2018-02-13 RX ADMIN — Medication 400 MILLIGRAM(S): at 12:50

## 2018-02-13 RX ADMIN — MORPHINE SULFATE 4 MILLIGRAM(S): 50 CAPSULE, EXTENDED RELEASE ORAL at 14:23

## 2018-02-13 RX ADMIN — Medication 100 MILLIGRAM(S): at 13:19

## 2018-02-13 RX ADMIN — SODIUM CHLORIDE 2000 MILLILITER(S): 9 INJECTION INTRAMUSCULAR; INTRAVENOUS; SUBCUTANEOUS at 12:41

## 2018-02-13 RX ADMIN — Medication 5 MILLIGRAM(S): at 13:18

## 2018-02-13 NOTE — ED PROVIDER NOTE - ATTENDING CONTRIBUTION TO CARE
38 yo F s/p recent pfd and occ-c2 fusion on with Dr. Murray 2/2 Tethered cord syndrome and Chiari malformation, Su-Danlos syndrome presents with pain to the site slight redness to staples, NVI b/l ue, pain control in ER at this time attained, NS consult, want CT cspine, dispo pending

## 2018-02-13 NOTE — CONSULT NOTE ADULT - SUBJECTIVE AND OBJECTIVE BOX
p ([id number])     HPI: Dilma Augustin 38 yo female sp pfd and occ-c2 fusion on , discharged the following friday. Comes in with superficial tenderness at wound site.  Nothing expressed. Wound site appears crusted and erythematous and tender to palpation. Denies any fevers, n, v, falls. CT c spine/occiput stable     PAST MEDICAL HISTORY   Tethered cord syndrome  EDS (Su-Danlos syndrome)  Chiari I malformation  Irritable bowel syndrome without diarrhea  Cervical disc disease  Low back pain  Asthma    PAST SURGICAL HISTORY   S/P myelogram  History of tonsillectomy  History of cholecystectomy  H/O:   S/P lumbar laminectomy    Bee Stings (Anaphylaxis)  Keflex (Anaphylaxis)  latex (Rash)  penicillins (Anaphylaxis)      MEDICATIONS:  Antibiotics:    Neuro:    Anticoagulation:    Other:      SOCIAL HISTORY:   Occupation:   Marital Status:     FAMILY HISTORY:  Family history of coronary artery disease (Sibling)  Family history of hepatitis C  Family history of drug abuse      REVIEW OF SYSTEMS:  Check here if all are normal other than Neurological [x]  General:  Eyes:  ENT:  Cardiac:  Respiratory:  GI:  Musculoskeletal:   Skin:  Neurologic:   Psychiatric:     PHYSICAL EXAMINATION:   T(C): 37.3 (18 @ 11:59), Max: 37.3 (18 @ 11:59)  HR: 113 (18 @ 11:59) (112 - 113)  BP: 137/97 (18 @ 11:59) (137/97 - 142/83)  RR: 18 (18 @ 11:59) (18 - 20)  SpO2: 100% (18 @ 11:59) (100% - 100%)  Wt(kg): --Height (cm): 157.48 ( @ 11:38)  Weight (kg): 81.6 ( @ 11:38)    General Examination:     Neurologic Examination:           PHYSICAL EXAM:    Constitutional: No Acute Distress     Neurological: AOx3, Following Commands    Motor exam:          Upper extremity                         Delt     Bicep     Tricep    HG                                                 R         5/5        5/5        5/5       5/5                                               L          5/5        5/5        5/5       5/5          Lower extremity                        HF         KF        KE       DF         PF                                                  R        5/5        5/5        5/5       5/5         5/5                                               L         5/5        5/5       5/5       5/5          5/5                                                 Sensation: [x] intact to light touch  [] decreased:     No clonus/hoffmans    Incision:  Nothing expressed. Wound site appears crusted and erythematous and tender to palpation. Denies any fevers, n, v, falls.    LABS:                RADIOLOGY & ADDITIONAL STUDIES:

## 2018-02-13 NOTE — ED ADULT NURSE NOTE - OBJECTIVE STATEMENT
36 y/o female A&Ox4, PMH Kyari malformation; 8 days post op for spinal fusion and decompression, POTS, Elhers Danlows Syndrome, presents to ED with c/o persistent headaches, neck stiffness and redness at incision site. Pt denies sunjective fevers/chills. Patient rates pain in neck and head as 7-8/10, tylenol taken at home, mild relief found. Upon further assessment, airway patent and intact, denies chest pain/SOB. ABD soft nontender, denies n/v/d. Skin intact. Peripheral pulses strong and normal baseline sensation present x4. Safety and comfort measures maintained.

## 2018-02-13 NOTE — H&P ADULT - ASSESSMENT
37 year old female sp pfd occ-c2 fusion 2/5.  In ed with erythema, pain, and crusted wound.  Denies fevers but has been taking tylenol regularly. 37 year old female sp pfd occ-c2 fusion 2/5.  In ed with erythema, pain, and crusted wound.  Denies fevers but has been taking tylenol regularly.     -admit latefi  -coags, TWO separate t/s, bmp, cbc, npo  -ct occiput/c spine pending 37 year old female sp pfd occ-c2 fusion 2/5.  In ed with erythema, pain, and crusted wound.  Denies fevers but has been taking tylenol regularly.     -admit latefi  -coags, t/s, bmp, cbc, npo, pregnancy test  -ct occiput/c spine pending

## 2018-02-13 NOTE — ED PROVIDER NOTE - OBJECTIVE STATEMENT
38 yo F s/p recent pfd and occ-c2 fusion on with Dr. Murray 2/2 Tethered cord syndrome and Chiari malformation, Su-Danlos syndrome, discharged the following Friday presents with worsening headache, neck pain, and erythema around surgical site over C-spine. Denies any fevers, chills, LOC, trauma/falls, N/V, CP, SOB, abdominal pain, focal neurologic deficit.

## 2018-02-13 NOTE — CONSULT NOTE ADULT - ASSESSMENT
37 year old female sp pfd occ-c2 fusion 2/5.  In ed with erythema, pain, and crusted wound.  Denies fevers but has been taking tylenol regularly.  CT c spine/occiput/head stable, spoke with Dr. Nolasco.    -no acute neurosurgical intervention  -ID rec clindamycin in setting of her keflex allergy  -may followup with Dr saini in 1 week

## 2018-02-13 NOTE — ED PROVIDER NOTE - PHYSICAL EXAMINATION
A&Ox3 moderate distress. CN 2-12 grossly intact without focal neurologic defict. Superficial tenderness at wound site without discharge or bleeding. Wound site appears crusted and erythematous and tender to palpation. PERRLA, EOMI. Heart RRR no murmur. No JVD. No peripheral edema. Lungs CTA b/l no wheezes, rhonchi, rales. Abdomen soft nontender nondistended. Peripheral pulses present and equal b/l. Head NCAT. Skin normal for race.

## 2018-02-13 NOTE — ED PROVIDER NOTE - CARE PLAN
Principal Discharge DX:	Post-op pain  Assessment and plan of treatment:	- Follow up with Dr. Murray in 1 week  - Take clindamycin 4 times per day for 10 days (at your pharmacy)  - Take Tylenol 1000mg every 6 hours as needed for pain  - Take prescription pain medications as prescribed  - Return to ER if you experience worsening pain, numbness, tingling, paralysis, loss of consciousness, or sign of infection such as high fever

## 2018-02-13 NOTE — ED PROVIDER NOTE - PLAN OF CARE
- Follow up with Dr. Murray in 1 week  - Take clindamycin 4 times per day for 10 days (at your pharmacy)  - Take Tylenol 1000mg every 6 hours as needed for pain  - Take prescription pain medications as prescribed  - Return to ER if you experience worsening pain, numbness, tingling, paralysis, loss of consciousness, or sign of infection such as high fever

## 2018-02-13 NOTE — H&P ADULT - HISTORY OF PRESENT ILLNESS
p ([id number])     HPI: Dilma Augustin 36 yo female sp pfd and occ-c2 fusion on , discharged the following friday. Comes in with superficial tenderness at wound site.  Nothing expressed. Wound site appears crusted and erythematous and tender to palpation. Denies any fevers, n, v, falls.    PAST MEDICAL HISTORY   Tethered cord syndrome  EDS (Su-Danlos syndrome)  Chiari I malformation  Irritable bowel syndrome without diarrhea  Cervical disc disease  Low back pain  Asthma    PAST SURGICAL HISTORY   S/P myelogram  History of tonsillectomy  History of cholecystectomy  H/O:   S/P lumbar laminectomy    Bee Stings (Anaphylaxis)  Keflex (Anaphylaxis)  latex (Rash)  penicillins (Anaphylaxis)      MEDICATIONS:  Antibiotics:    Neuro:    Anticoagulation:    Other:      SOCIAL HISTORY:   Occupation:   Marital Status:     FAMILY HISTORY:  Family history of coronary artery disease (Sibling)  Family history of hepatitis C  Family history of drug abuse      REVIEW OF SYSTEMS:  Check here if all are normal other than Neurological [x]  General:  Eyes:  ENT:  Cardiac:  Respiratory:  GI:  Musculoskeletal:   Skin:  Neurologic:   Psychiatric:     PHYSICAL EXAMINATION:   T(C): 37.3 (18 @ 11:59), Max: 37.3 (18 @ 11:59)  HR: 113 (18 @ 11:59) (112 - 113)  BP: 137/97 (18 @ 11:59) (137/97 - 142/83)  RR: 18 (18 @ 11:59) (18 - 20)  SpO2: 100% (18 @ 11:59) (100% - 100%)  Wt(kg): --Height (cm): 157.48 ( @ 11:38)  Weight (kg): 81.6 ( @ 11:38)    General Examination:     Neurologic Examination:           PHYSICAL EXAM:    Constitutional: No Acute Distress     Neurological: AOx3, Following Commands    Motor exam:          Upper extremity                         Delt     Bicep     Tricep    HG                                                 R         5/5        5/5        5/5       5/5                                               L          5/5        5/5        5/5       5/5          Lower extremity                        HF         KF        KE       DF         PF                                                  R        5/5        5/5        5/5       5/5         5/5                                               L         5/5        5/5       5/5       5/5          5/5                                                 Sensation: [x] intact to light touch  [] decreased:     No clonus/hoffmans    Incision:  Nothing expressed. Wound site appears crusted and erythematous and tender to palpation. Denies any fevers, n, v, falls.    LABS:                RADIOLOGY & ADDITIONAL STUDIES:

## 2018-02-13 NOTE — ED ADULT NURSE NOTE - CHPI ED SYMPTOMS NEG
no confusion/no vomiting/no blurred vision/no change in level of consciousness/no loss of consciousness/no dizziness/no fever/no nausea/no numbness

## 2018-02-18 LAB
CULTURE RESULTS: SIGNIFICANT CHANGE UP
CULTURE RESULTS: SIGNIFICANT CHANGE UP
SPECIMEN SOURCE: SIGNIFICANT CHANGE UP
SPECIMEN SOURCE: SIGNIFICANT CHANGE UP

## 2018-02-21 ENCOUNTER — APPOINTMENT (OUTPATIENT)
Dept: SPINE | Facility: CLINIC | Age: 38
End: 2018-02-21
Payer: MEDICARE

## 2018-02-21 ENCOUNTER — OTHER (OUTPATIENT)
Age: 38
End: 2018-02-21

## 2018-02-21 VITALS
HEIGHT: 62 IN | DIASTOLIC BLOOD PRESSURE: 70 MMHG | WEIGHT: 172 LBS | SYSTOLIC BLOOD PRESSURE: 118 MMHG | BODY MASS INDEX: 31.65 KG/M2

## 2018-02-21 PROCEDURE — 99024 POSTOP FOLLOW-UP VISIT: CPT

## 2018-02-25 ENCOUNTER — INPATIENT (INPATIENT)
Facility: HOSPITAL | Age: 38
LOS: 2 days | Discharge: HOME | DRG: 908 | End: 2018-02-28
Attending: NEUROLOGICAL SURGERY | Admitting: NEUROLOGICAL SURGERY
Payer: MEDICARE

## 2018-02-25 VITALS
OXYGEN SATURATION: 100 % | TEMPERATURE: 98 F | HEART RATE: 122 BPM | SYSTOLIC BLOOD PRESSURE: 134 MMHG | WEIGHT: 175.05 LBS | DIASTOLIC BLOOD PRESSURE: 83 MMHG | RESPIRATION RATE: 18 BRPM

## 2018-02-25 DIAGNOSIS — Z98.890 OTHER SPECIFIED POSTPROCEDURAL STATES: Chronic | ICD-10-CM

## 2018-02-25 DIAGNOSIS — Z98.89 OTHER SPECIFIED POSTPROCEDURAL STATES: Chronic | ICD-10-CM

## 2018-02-25 DIAGNOSIS — T81.31XA DISRUPTION OF EXTERNAL OPERATION (SURGICAL) WOUND, NOT ELSEWHERE CLASSIFIED, INITIAL ENCOUNTER: ICD-10-CM

## 2018-02-25 DIAGNOSIS — K58.1 IRRITABLE BOWEL SYNDROME WITH CONSTIPATION: ICD-10-CM

## 2018-02-25 DIAGNOSIS — G93.5 COMPRESSION OF BRAIN: ICD-10-CM

## 2018-02-25 DIAGNOSIS — J45.20 MILD INTERMITTENT ASTHMA, UNCOMPLICATED: ICD-10-CM

## 2018-02-25 DIAGNOSIS — R00.0 TACHYCARDIA, UNSPECIFIED: ICD-10-CM

## 2018-02-25 LAB
ALBUMIN SERPL ELPH-MCNC: 3.6 G/DL — SIGNIFICANT CHANGE UP (ref 3.3–5)
ALP SERPL-CCNC: 100 U/L — SIGNIFICANT CHANGE UP (ref 40–120)
ALT FLD-CCNC: 24 U/L RC — SIGNIFICANT CHANGE UP (ref 10–45)
ANION GAP SERPL CALC-SCNC: 12 MMOL/L — SIGNIFICANT CHANGE UP (ref 5–17)
APTT BLD: 30.7 SEC — SIGNIFICANT CHANGE UP (ref 27.5–37.4)
AST SERPL-CCNC: 19 U/L — SIGNIFICANT CHANGE UP (ref 10–40)
BASOPHILS # BLD AUTO: 0 K/UL — SIGNIFICANT CHANGE UP (ref 0–0.2)
BASOPHILS NFR BLD AUTO: 0.4 % — SIGNIFICANT CHANGE UP (ref 0–2)
BILIRUB SERPL-MCNC: 0.1 MG/DL — LOW (ref 0.2–1.2)
BLD GP AB SCN SERPL QL: NEGATIVE — SIGNIFICANT CHANGE UP
BLD GP AB SCN SERPL QL: NEGATIVE — SIGNIFICANT CHANGE UP
BUN SERPL-MCNC: 10 MG/DL — SIGNIFICANT CHANGE UP (ref 7–23)
CALCIUM SERPL-MCNC: 8.8 MG/DL — SIGNIFICANT CHANGE UP (ref 8.4–10.5)
CHLORIDE SERPL-SCNC: 102 MMOL/L — SIGNIFICANT CHANGE UP (ref 96–108)
CO2 SERPL-SCNC: 23 MMOL/L — SIGNIFICANT CHANGE UP (ref 22–31)
CREAT SERPL-MCNC: 0.62 MG/DL — SIGNIFICANT CHANGE UP (ref 0.5–1.3)
EOSINOPHIL # BLD AUTO: 0.2 K/UL — SIGNIFICANT CHANGE UP (ref 0–0.5)
EOSINOPHIL NFR BLD AUTO: 2 % — SIGNIFICANT CHANGE UP (ref 0–6)
GLUCOSE SERPL-MCNC: 87 MG/DL — SIGNIFICANT CHANGE UP (ref 70–99)
HCG SERPL-ACNC: <2 MIU/ML — LOW (ref 5–24)
HCT VFR BLD CALC: 36.1 % — SIGNIFICANT CHANGE UP (ref 34.5–45)
HGB BLD-MCNC: 12.4 G/DL — SIGNIFICANT CHANGE UP (ref 11.5–15.5)
INR BLD: 1.02 RATIO — SIGNIFICANT CHANGE UP (ref 0.88–1.16)
LYMPHOCYTES # BLD AUTO: 2.1 K/UL — SIGNIFICANT CHANGE UP (ref 1–3.3)
LYMPHOCYTES # BLD AUTO: 20.6 % — SIGNIFICANT CHANGE UP (ref 13–44)
MCHC RBC-ENTMCNC: 31.5 PG — SIGNIFICANT CHANGE UP (ref 27–34)
MCHC RBC-ENTMCNC: 34.2 GM/DL — SIGNIFICANT CHANGE UP (ref 32–36)
MCV RBC AUTO: 92.1 FL — SIGNIFICANT CHANGE UP (ref 80–100)
MONOCYTES # BLD AUTO: 0.7 K/UL — SIGNIFICANT CHANGE UP (ref 0–0.9)
MONOCYTES NFR BLD AUTO: 7.1 % — SIGNIFICANT CHANGE UP (ref 2–14)
NEUTROPHILS # BLD AUTO: 7.2 K/UL — SIGNIFICANT CHANGE UP (ref 1.8–7.4)
NEUTROPHILS NFR BLD AUTO: 69.9 % — SIGNIFICANT CHANGE UP (ref 43–77)
PLATELET # BLD AUTO: 394 K/UL — SIGNIFICANT CHANGE UP (ref 150–400)
POTASSIUM SERPL-MCNC: 3.9 MMOL/L — SIGNIFICANT CHANGE UP (ref 3.5–5.3)
POTASSIUM SERPL-SCNC: 3.9 MMOL/L — SIGNIFICANT CHANGE UP (ref 3.5–5.3)
PROT SERPL-MCNC: 6.8 G/DL — SIGNIFICANT CHANGE UP (ref 6–8.3)
PROTHROM AB SERPL-ACNC: 11 SEC — SIGNIFICANT CHANGE UP (ref 9.8–12.7)
RBC # BLD: 3.92 M/UL — SIGNIFICANT CHANGE UP (ref 3.8–5.2)
RBC # FLD: 12.5 % — SIGNIFICANT CHANGE UP (ref 10.3–14.5)
RH IG SCN BLD-IMP: POSITIVE — SIGNIFICANT CHANGE UP
RH IG SCN BLD-IMP: POSITIVE — SIGNIFICANT CHANGE UP
SODIUM SERPL-SCNC: 137 MMOL/L — SIGNIFICANT CHANGE UP (ref 135–145)
WBC # BLD: 10.3 K/UL — SIGNIFICANT CHANGE UP (ref 3.8–10.5)
WBC # FLD AUTO: 10.3 K/UL — SIGNIFICANT CHANGE UP (ref 3.8–10.5)

## 2018-02-25 PROCEDURE — 99223 1ST HOSP IP/OBS HIGH 75: CPT

## 2018-02-25 PROCEDURE — 72125 CT NECK SPINE W/O DYE: CPT | Mod: 26

## 2018-02-25 PROCEDURE — 93010 ELECTROCARDIOGRAM REPORT: CPT

## 2018-02-25 PROCEDURE — 14301 TIS TRNFR ANY 30.1-60 SQ CM: CPT

## 2018-02-25 PROCEDURE — 99285 EMERGENCY DEPT VISIT HI MDM: CPT | Mod: GC

## 2018-02-25 PROCEDURE — 14302 TIS TRNFR ADDL 30 SQ CM: CPT

## 2018-02-25 PROCEDURE — 15734 MUSCLE-SKIN GRAFT TRUNK: CPT

## 2018-02-25 RX ORDER — DULOXETINE HYDROCHLORIDE 30 MG/1
30 CAPSULE, DELAYED RELEASE ORAL DAILY
Qty: 0 | Refills: 0 | Status: DISCONTINUED | OUTPATIENT
Start: 2018-02-25 | End: 2018-02-26

## 2018-02-25 RX ORDER — CYCLOBENZAPRINE HYDROCHLORIDE 10 MG/1
10 TABLET, FILM COATED ORAL THREE TIMES A DAY
Qty: 0 | Refills: 0 | Status: DISCONTINUED | OUTPATIENT
Start: 2018-02-25 | End: 2018-02-26

## 2018-02-25 RX ORDER — ACETAMINOPHEN 500 MG
650 TABLET ORAL EVERY 6 HOURS
Qty: 0 | Refills: 0 | Status: DISCONTINUED | OUTPATIENT
Start: 2018-02-25 | End: 2018-02-26

## 2018-02-25 RX ORDER — ONDANSETRON 8 MG/1
4 TABLET, FILM COATED ORAL EVERY 6 HOURS
Qty: 0 | Refills: 0 | Status: DISCONTINUED | OUTPATIENT
Start: 2018-02-25 | End: 2018-02-26

## 2018-02-25 RX ORDER — DOCUSATE SODIUM 100 MG
100 CAPSULE ORAL THREE TIMES A DAY
Qty: 0 | Refills: 0 | Status: DISCONTINUED | OUTPATIENT
Start: 2018-02-25 | End: 2018-02-26

## 2018-02-25 RX ORDER — DEXTROSE MONOHYDRATE, SODIUM CHLORIDE, AND POTASSIUM CHLORIDE 50; .745; 4.5 G/1000ML; G/1000ML; G/1000ML
1000 INJECTION, SOLUTION INTRAVENOUS
Qty: 0 | Refills: 0 | Status: DISCONTINUED | OUTPATIENT
Start: 2018-02-25 | End: 2018-02-26

## 2018-02-25 RX ORDER — TOPIRAMATE 25 MG
100 TABLET ORAL
Qty: 0 | Refills: 0 | Status: DISCONTINUED | OUTPATIENT
Start: 2018-02-25 | End: 2018-02-26

## 2018-02-25 RX ORDER — ALBUTEROL 90 UG/1
2 AEROSOL, METERED ORAL EVERY 6 HOURS
Qty: 0 | Refills: 0 | Status: DISCONTINUED | OUTPATIENT
Start: 2018-02-25 | End: 2018-02-26

## 2018-02-25 RX ORDER — SENNA PLUS 8.6 MG/1
2 TABLET ORAL AT BEDTIME
Qty: 0 | Refills: 0 | Status: DISCONTINUED | OUTPATIENT
Start: 2018-02-25 | End: 2018-02-26

## 2018-02-25 RX ORDER — INFLUENZA VIRUS VACCINE 15; 15; 15; 15 UG/.5ML; UG/.5ML; UG/.5ML; UG/.5ML
0.5 SUSPENSION INTRAMUSCULAR ONCE
Qty: 0 | Refills: 0 | Status: DISCONTINUED | OUTPATIENT
Start: 2018-02-25 | End: 2018-02-28

## 2018-02-25 RX ORDER — DIAZEPAM 5 MG
5 TABLET ORAL EVERY 8 HOURS
Qty: 0 | Refills: 0 | Status: DISCONTINUED | OUTPATIENT
Start: 2018-02-25 | End: 2018-02-26

## 2018-02-25 RX ADMIN — Medication 100 MILLIGRAM(S): at 21:07

## 2018-02-25 NOTE — H&P ADULT - ATTENDING COMMENTS
I saw and evaluated the patient. I reviewed the resident's note and agree. The patient is a 37-year-old female with a history of Chiari 1 malformation and Su-Danlos syndrome s/p suboccipital craniectomy, C1 laminectomy, and occipital condyle-C2 posterior craniocervical instrumentation and fusion on 2/5/18 who presents with redness around her surgical site and dehiscence of the middle portion of her wound.  The patient is currently neurologically stable.  The patient is planned for a wound washout and revision of her posterior craniocervical instrumentation and fusion with Plastics closure tomorrow.

## 2018-02-25 NOTE — CONSULT NOTE ADULT - SUBJECTIVE AND OBJECTIVE BOX
Edgardo Navas MD  ([facility] Hospitalist)  Page [phone]    *** INCOMPLETE. NOTE IN PROGRESS. ***    PMD:     HPI:  36yo Female with PMH/ PSH as below, including Chiari Malformation, Su Danlos s/p spinal fusion surgery (), p/w redness around surgical site and dehiscence of the central portion of her wound. Pt. denies fever chills HA CP SOB n/v/d. Patient denies new weakness, numbness, paresthesias.      PAST MEDICAL & SURGICAL HISTORY:  Negative coronary CT angiogram (Within the past year)  CT of the Chest showing emphysema  Asthma: no recent exacerbations  Tethered cord syndrome  EDS (Su-Danlos syndrome)  POTS, h/o syncope  Chiari I malformation  Irritable bowel syndrome without diarrhea: with constipation  Cervical disc disease  Low back pain: chronic x 2 1/2 years  S/P myelogram: c/b CSF leak, spinal headache, S/P blood patch  History of tonsillectomy: age 18  History of cholecystectomy: laparoscopic   H/O: : 2004  S/P lumbar laminectomy: , complicated with CSF leak, S/P blood patch      REVIEW OF SYMPTOMS:  CONSTITUTIONAL: No fever, weight loss, or fatigue  EYES: No eye pain, visual disturbances, or discharge  ENMT:  No difficulty hearing, tinnitus, vertigo; No sinus or throat pain  NECK: No pain or stiffness  BREASTS: No pain, masses, or nipple discharge  RESPIRATORY: No cough, wheezing, chills or hemoptysis; No shortness of breath  CARDIOVASCULAR: No chest pain, palpitations, dizziness, or leg swelling  GASTROINTESTINAL: No abdominal or epigastric pain. No nausea, vomiting, or hematemesis; No diarrhea or constipation. No melena or hematochezia.  GENITOURINARY: No dysuria, frequency, hematuria, or incontinence  NEUROLOGICAL: No headaches, memory loss, loss of strength, numbness, or tremors  SKIN: No itching, burning, rashes, or lesions   LYMPH NODES: No enlarged glands  ENDOCRINE: No heat or cold intolerance; No hair loss  MUSCULOSKELETAL: No joint pain or swelling; No muscle, back, or extremity pain  PSYCHIATRIC: No depression, anxiety, mood swings, or difficulty sleeping  HEME/LYMPH: No easy bruising, or bleeding gums  ALLERGIC AND IMMUNOLOGIC: No hives or eczema    ALLERGIES:  Bee Stings (Anaphylaxis)  Keflex (Anaphylaxis)  latex (Rash)  penicillins (Anaphylaxis)    SOCIAL HISTORY:   No tobacco, drugs or etoh    FAMILY HISTORY:  Family history of coronary artery disease (Sibling): sister has 4 stents dx age 38  Family history of hepatitis C  Family history of drug abuse    HOME MEDICATIONS:  dialudid 4 mg PO q6h PRN  diazepam 5 mg PO q8h PRN muscle spasm  topamax 100 mg PO BID  cymbalta 30 mg PO daily  flexeril 10 mg PO TID  Ventolin HFA 90 mcg/inh inhalation aerosol 2 puffs INH PRN  senna oral tablet 2 tabs PO qHS  colace 100 mg PO TID PRN  bisacodyl 5 mg oral delayed release tablet PO PRN    INPATIENT MEDICATIONS  (STANDING):  diazepam    Tablet 5 milliGRAM(s) Oral every 8 hours  docusate sodium 100 milliGRAM(s) Oral three times a day  DULoxetine 30 milliGRAM(s) Oral daily  sodium chloride 0.9% with potassium chloride 20 mEq/L 1000 milliLiter(s) (75 mL/Hr) IV Continuous <Continuous>  topiramate 100 milliGRAM(s) Oral two times a day    MEDICATIONS  (PRN):  acetaminophen   Tablet 650 milliGRAM(s) Oral every 6 hours PRN For Temp greater than 38 C (100.4 F)  acetaminophen   Tablet. 650 milliGRAM(s) Oral every 6 hours PRN Mild Pain (1 - 3)  ALBUTerol    90 MICROgram(s) HFA Inhaler 2 Puff(s) Inhalation every 6 hours PRN Shortness of Breath and/or Wheezing  bisacodyl 5 milliGRAM(s) Oral daily PRN Constipation  cyclobenzaprine 10 milliGRAM(s) Oral three times a day PRN Muscle Spasm  diazepam    Tablet 5 milliGRAM(s) Oral daily PRN Anxiety or muscle spasm  ondansetron Injectable 4 milliGRAM(s) IV Push every 6 hours PRN Nausea and/or Vomiting  senna 2 Tablet(s) Oral at bedtime PRN Constipation      Vital Signs Last 24 Hrs  T(C): 36.8 (2018 15:00), Max: 36.8 (2018 15:00)  T(F): 98.3 (2018 15:00), Max: 98.3 (2018 15:00)  HR: 120 (2018 15:00) (120 - 122)  BP: 125/82 (2018 15:00) (125/82 - 134/83)  BP(mean): --  RR: 20 (2018 15:00) (18 - 20)  SpO2: 99% (2018 15:00) (99% - 100%)  CAPILLARY BLOOD GLUCOSE      PHYSICAL EXAM:  GENERAL: NAD  HEENT:  Atraumatic, Normocephalic, MMM, neck supple, no JVD  EYES: EOMI, PERRLA, conjunctiva and sclera clear  CHEST/LUNG: Clear to auscultation bilaterally; No wheeze, rhonchi or rales.  HEART: S1, S2, rrr; No murmurs, rubs, or gallops  ABDOMEN: Soft, Nontender, Nondistended; Bowel sounds present  EXTREMITIES:  2+ Peripheral Pulses, No clubbing, cyanosis, or edema  PSYCH: calm  NEUROLOGY: non-focal, AOx3  SKIN: No rashes or lesions      LABS:                        12.4   10.3  )-----------( 394      ( 2018 14:33 )             36.1         137  |  102  |  10  ----------------------------<  87  3.9   |  23  |  0.62    Ca    8.8      2018 14:33    TPro  6.8  /  Alb  3.6  /  TBili  0.1<L>  /  DBili  x   /  AST  19  /  ALT  24  /  AlkPhos  100      PT/INR - ( 2018 14:33 )   PT: 11.0 sec;   INR: 1.02 ratio    PTT - ( 2018 14:33 )  PTT:30.7 sec      RADIOLOGY & ADDITIONAL TESTS:    Imaging Personally Reviewed:   CT Cervical Spine : "Redemonstration of spinal fusion hardware. The right occipital condyle screw does not traverse the right occipital condyle. This is unchanged when compared to the prior examination. No fracture or subluxation."  TTE : "Increased relative wall thickness with normal left ventricular mass index, consistent with concentric left ventricular remodeling. Normal left ventricular systolic function. No segmental wall motion abnormalities."    ECG reviewed and interpreted: NSR, no ischemia     Consultant(s) Notes Reviewed:  Neurosurgery    Care Discussed with Consultants/Other Providers: Neurosurgery re: pre-op care Edgardo Navas MD  ([facility] Hospitalist)  Page [phone])    *** INCOMPLETE. NOTE IN PROGRESS. ***    PMD:     HPI:  36yo Female with PMH/ PSH as below, including Chiari Malformation, Su Danlos, on / s/p suboccipital craniectomy, C1 laminectomy, and occipital condyle-C2 posterior craniocervical instrumentation and fusion. She is presenting with redness around surgical site and dehiscence of the central portion of her wound. The patient denies fever, chills, HA, CP, SOB, n/v/d. Patient denies new weakness, numbness, paresthesias.      PAST MEDICAL & SURGICAL HISTORY:  Negative coronary CT angiogram (Within the past year)  CT of the Chest showing emphysema  Asthma: no recent exacerbations  Tethered cord syndrome  EDS (Su-Danlos syndrome)  POTS, h/o syncope  Chiari I malformation  Irritable bowel syndrome without diarrhea: with constipation  Cervical disc disease  Low back pain: chronic x 2 1/2 years  S/P myelogram: c/b CSF leak, spinal headache, S/P blood patch  History of tonsillectomy: age 18  History of cholecystectomy: laparoscopic   H/O: :   S/P lumbar laminectomy: , complicated with CSF leak, S/P blood patch      REVIEW OF SYMPTOMS:  CONSTITUTIONAL: No fever, weight loss, or fatigue  EYES: No eye pain, visual disturbances, or discharge  ENMT:  No difficulty hearing, tinnitus, vertigo; No sinus or throat pain  NECK: No pain or stiffness  BREASTS: No pain, masses, or nipple discharge  RESPIRATORY: No cough, wheezing, chills or hemoptysis; No shortness of breath  CARDIOVASCULAR: No chest pain, palpitations, dizziness, or leg swelling  GASTROINTESTINAL: No abdominal or epigastric pain. No nausea, vomiting, or hematemesis; No diarrhea or constipation. No melena or hematochezia.  GENITOURINARY: No dysuria, frequency, hematuria, or incontinence  NEUROLOGICAL: No headaches, memory loss, loss of strength, numbness, or tremors  SKIN: No itching, burning, rashes, or lesions   LYMPH NODES: No enlarged glands  ENDOCRINE: No heat or cold intolerance; No hair loss  MUSCULOSKELETAL: No joint pain or swelling; No muscle, back, or extremity pain  PSYCHIATRIC: No depression, anxiety, mood swings, or difficulty sleeping  HEME/LYMPH: No easy bruising, or bleeding gums  ALLERGIC AND IMMUNOLOGIC: No hives or eczema    ALLERGIES:  Bee Stings (Anaphylaxis)  Keflex (Anaphylaxis)  latex (Rash)  penicillins (Anaphylaxis)    SOCIAL HISTORY:   No tobacco, drugs or etoh    FAMILY HISTORY:  Family history of coronary artery disease (Sibling): sister has 4 stents dx age 38  Family history of hepatitis C  Family history of drug abuse    HOME MEDICATIONS:  dialudid 4 mg PO q6h PRN  diazepam 5 mg PO q8h PRN muscle spasm  topamax 100 mg PO BID  cymbalta 30 mg PO daily  flexeril 10 mg PO TID  Ventolin HFA 90 mcg/inh inhalation aerosol 2 puffs INH PRN  senna oral tablet 2 tabs PO qHS  colace 100 mg PO TID PRN  bisacodyl 5 mg oral delayed release tablet PO PRN    INPATIENT MEDICATIONS  (STANDING):  diazepam    Tablet 5 milliGRAM(s) Oral every 8 hours  docusate sodium 100 milliGRAM(s) Oral three times a day  DULoxetine 30 milliGRAM(s) Oral daily  sodium chloride 0.9% with potassium chloride 20 mEq/L 1000 milliLiter(s) (75 mL/Hr) IV Continuous <Continuous>  topiramate 100 milliGRAM(s) Oral two times a day    MEDICATIONS  (PRN):  acetaminophen   Tablet 650 milliGRAM(s) Oral every 6 hours PRN For Temp greater than 38 C (100.4 F)  acetaminophen   Tablet. 650 milliGRAM(s) Oral every 6 hours PRN Mild Pain (1 - 3)  ALBUTerol    90 MICROgram(s) HFA Inhaler 2 Puff(s) Inhalation every 6 hours PRN Shortness of Breath and/or Wheezing  bisacodyl 5 milliGRAM(s) Oral daily PRN Constipation  cyclobenzaprine 10 milliGRAM(s) Oral three times a day PRN Muscle Spasm  diazepam    Tablet 5 milliGRAM(s) Oral daily PRN Anxiety or muscle spasm  ondansetron Injectable 4 milliGRAM(s) IV Push every 6 hours PRN Nausea and/or Vomiting  senna 2 Tablet(s) Oral at bedtime PRN Constipation      Vital Signs Last 24 Hrs  T(C): 36.8 (2018 15:00), Max: 36.8 (2018 15:00)  T(F): 98.3 (2018 15:00), Max: 98.3 (2018 15:00)  HR: 120 (2018 15:00) (120 - 122)  BP: 125/82 (2018 15:00) (125/82 - 134/83)  BP(mean): --  RR: 20 (2018 15:00) (18 - 20)  SpO2: 99% (2018 15:00) (99% - 100%)  CAPILLARY BLOOD GLUCOSE      PHYSICAL EXAM:  GENERAL: NAD  HEENT:  Atraumatic, Normocephalic, MMM, neck supple, no JVD  EYES: EOMI, PERRLA, conjunctiva and sclera clear  CHEST/LUNG: Clear to auscultation bilaterally; No wheeze, rhonchi or rales.  HEART: S1, S2, rrr; No murmurs, rubs, or gallops  ABDOMEN: Soft, Nontender, Nondistended; Bowel sounds present  EXTREMITIES:  2+ Peripheral Pulses, No clubbing, cyanosis, or edema  PSYCH: calm  NEUROLOGY: non-focal, AOx3  SKIN: No rashes or lesions      LABS:                        12.4   10.3  )-----------( 394      ( 2018 14:33 )             36.1     -    137  |  102  |  10  ----------------------------<  87  3.9   |  23  |  0.62    Ca    8.8      2018 14:33    TPro  6.8  /  Alb  3.6  /  TBili  0.1<L>  /  DBili  x   /  AST  19  /  ALT  24  /  AlkPhos  100      PT/INR - ( 2018 14:33 )   PT: 11.0 sec;   INR: 1.02 ratio    PTT - ( 2018 14:33 )  PTT:30.7 sec      RADIOLOGY & ADDITIONAL TESTS:    Imaging Personally Reviewed:   CT Cervical Spine : "Redemonstration of spinal fusion hardware. The right occipital condyle screw does not traverse the right occipital condyle. This is unchanged when compared to the prior examination. No fracture or subluxation."  TTE : "Increased relative wall thickness with normal left ventricular mass index, consistent with concentric left ventricular remodeling. Normal left ventricular systolic function. No segmental wall motion abnormalities."    ECG reviewed and interpreted: NSR, no ischemia     Consultant(s) Notes Reviewed:  Neurosurgery    Care Discussed with Consultants/Other Providers: Neurosurgery re: pre-op care Edgardo Navas MD  ([facility] Hospitalist)  Page [phone]    PMD: Dr. Johny Villegas [phone]    HPI:  36yo Female with PMH/ PSH as below, including Chiari Malformation, Su Danlos, on  s/p suboccipital craniectomy, C1 laminectomy, and occipital condyle-C2 posterior craniocervical instrumentation and fusion. She is presenting with redness around surgical site and dehiscence of the central portion of her wound. She was prescribed 10 days of clindamycin, which she just completed. The would looks a little better, but remains red. The patient denies fever, chills, HA, CP, SOB, n/v/d. Patient denies new weakness, numbness, paresthesias. Consulted for pre-op medical optimization. Denies chest pain, shortness of breath, lightheadedness, and palpitations.       PAST MEDICAL & SURGICAL HISTORY:  Negative coronary CT angiogram (Within the past year)  CT of the Chest showing emphysema  Asthma: no recent exacerbations  Tethered cord syndrome  EDS (Su-Danlos syndrome)  POTS, h/o syncope  Chiari I malformation  Irritable bowel syndrome without diarrhea: with constipation  Cervical disc disease  Low back pain: chronic x 2 1/2 years  S/P myelogram: c/b CSF leak, spinal headache, S/P blood patch  History of tonsillectomy: age 18  History of cholecystectomy: laparoscopic   H/O: :   S/P lumbar laminectomy: , complicated with CSF leak, S/P blood patch      REVIEW OF SYMPTOMS:  CONSTITUTIONAL: No fever, weight loss, or fatigue  EYES: Right eye chronically blurry since May 2017 (surgery).   ENMT:  No difficulty hearing, tinnitus, vertigo; Swallowing difficulty since the surgery this month unless she chews her food carefully.   NECK: +pain in the neck related to the recent surgery  RESPIRATORY: No cough, wheezing, chills or hemoptysis; No shortness of breath  CARDIOVASCULAR: No chest pain, palpitations, dizziness, or leg swelling  GASTROINTESTINAL: No abdominal or epigastric pain. No nausea, vomiting, or hematemesis; +Constipation. No melena or hematochezia.  GENITOURINARY: No dysuria, frequency, hematuria, or incontinence  NEUROLOGICAL: +Headaches. No memory loss, loss of strength, numbness, or tremors  SKIN: +would that is not healing well over the posterior cervical area. No other rashes  LYMPH NODES: No enlarged glands  ENDOCRINE: No heat or cold intolerance; No hair loss  PSYCHIATRIC: +Depression, +anxiety.  HEME/LYMPH: No easy bruising, or bleeding gums  ALLERGIC AND IMMUNOLOGIC: No hives or eczema    ALLERGIES:  Bee Stings (Anaphylaxis)  Keflex (Anaphylaxis)  latex (Rash)  penicillins (Anaphylaxis)    SOCIAL HISTORY:   No tobacco, drugs or EtOH. Marries, lives with , daughter and mother. On disability (doesn't work).     FAMILY HISTORY:  Family history of coronary artery disease (Sibling): sister has 4 stents dx age 38  Family history of drug abuse (bother uses heroin)    HOME MEDICATIONS:  dialudid 4 mg PO q6h PRN  diazepam 5 mg PO q8h PRN muscle spasm  topamax 100 mg PO BID  cymbalta 30 mg PO daily  flexeril 10 mg PO TID  Ventolin HFA 90 mcg/inh inhalation aerosol 2 puffs INH PRN  senna oral tablet 2 tabs PO qHS  colace 100 mg PO TID PRN  bisacodyl 5 mg oral delayed release tablet PO PRN    INPATIENT MEDICATIONS  (STANDING):  diazepam    Tablet 5 milliGRAM(s) Oral every 8 hours  docusate sodium 100 milliGRAM(s) Oral three times a day  DULoxetine 30 milliGRAM(s) Oral daily  sodium chloride 0.9% with potassium chloride 20 mEq/L 1000 milliLiter(s) (75 mL/Hr) IV Continuous  topiramate 100 milliGRAM(s) Oral two times a day    MEDICATIONS  (PRN):  acetaminophen   Tablet 650 milliGRAM(s) Oral every 6 hours PRN For Temp greater than 38 C (100.4 F)  acetaminophen   Tablet. 650 milliGRAM(s) Oral every 6 hours PRN Mild Pain (1 - 3)  ALBUTerol    90 MICROgram(s) HFA Inhaler 2 Puff(s) Inhalation every 6 hours PRN Shortness of Breath and/or Wheezing  bisacodyl 5 milliGRAM(s) Oral daily PRN Constipation  cyclobenzaprine 10 milliGRAM(s) Oral three times a day PRN Muscle Spasm  diazepam    Tablet 5 milliGRAM(s) Oral daily PRN Anxiety or muscle spasm  ondansetron Injectable 4 milliGRAM(s) IV Push every 6 hours PRN Nausea and/or Vomiting  senna 2 Tablet(s) Oral at bedtime PRN Constipation      Vital Signs Last 24 Hrs  T(C): 36.8 (2018 15:00), Max: 36.8 (2018 15:00)  T(F): 98.3 (2018 15:00), Max: 98.3 (2018 15:00)  HR: 120 (2018 15:00) (120 - 122)  BP: 125/82 (2018 15:00) (125/82 - 134/83)  BP(mean): --  RR: 20 (2018 15:00) (18 - 20)  SpO2: 99% (2018 15:00) (99% - 100%)  CAPILLARY BLOOD GLUCOSE      PHYSICAL EXAM:  GENERAL: NAD  HEENT:  Atraumatic, Normocephalic, MMM, no JVD. Neck with limited ROM 2/2 surgery. Posterior cervical surgical wound dehiscence with surrounding erythema   EYES: EOMI, PERRLA, conjunctiva and sclera clear  CHEST/LUNG: Clear to auscultation bilaterally; No wheeze, rhonchi or rales.  HEART: S1, S2, rrr; No murmurs, rubs, or gallops  ABDOMEN: Soft, Nontender, Nondistended; Bowel sounds diminished.  EXTREMITIES:  2+ Peripheral Pulses, No clubbing, cyanosis, or edema  PSYCH: calm  NEUROLOGY: non-focal, AOx3      LABS:                        12.4   10.3  )-----------( 394      ( 2018 14:33 )             36.1         137  |  102  |  10  ----------------------------<  87  3.9   |  23  |  0.62    Ca    8.8      2018 14:33    TPro  6.8  /  Alb  3.6  /  TBili  0.1<L>  /  DBili  x   /  AST  19  /  ALT  24  /  AlkPhos  100  -    PT/INR - ( 2018 14:33 )   PT: 11.0 sec;   INR: 1.02 ratio    PTT - ( 2018 14:33 )  PTT:30.7 sec      RADIOLOGY & ADDITIONAL TESTS:    Imaging Personally Reviewed:   CT Cervical Spine 2/25: "Redemonstration of spinal fusion hardware. The right occipital condyle screw does not traverse the right occipital condyle. This is unchanged when compared to the prior examination. No fracture or subluxation."  TTE : "Increased relative wall thickness with normal left ventricular mass index, consistent with concentric left ventricular remodeling. Normal left ventricular systolic function. No segmental wall motion abnormalities."    ECG reviewed and interpreted: NSR, no ischemia     Consultant(s) Notes Reviewed:  Neurosurgery    Care Discussed with Consultants/Other Providers: Neurosurgery re: pre-op care

## 2018-02-25 NOTE — ED PROVIDER NOTE - OBJECTIVE STATEMENT
38yo F pmhx Chiari Malformation, Su Danlos s/p spinal fusion surgery p/w CC surgery complication. Pt. states that "one of the surgical screws is in the wrong place and they have to do the surgery again". Pt. also reports redness around surgical site. Denies fever chills HA CP SOB n/v/d.

## 2018-02-25 NOTE — H&P ADULT - NSHPPHYSICALEXAM_GEN_ALL_CORE
PHYSICAL EXAM:    Constitutional: No Acute Distress     Neurological: AOx3, Following Commands    Motor exam:          Upper extremity                         Delt     Bicep     Tricep    HG                                                 R         5/5        5/5        5/5       5/5                                               L          5/5        5/5        5/5       5/5          Lower extremity                        HF         KF        KE       DF         PF                                                  R        4+/5        5/5        5/5       4+/5         5/5                                               L         5/5        5/5       5/5       5/5          5/5                                                 Sensation: [x] intact to light touch  [] decreased:     No clonus    Incision: CD, small area of dehisence mid incision

## 2018-02-25 NOTE — H&P ADULT - REASON FOR ADMISSION
Treatment Plan Tracking    #1 Treatment Plan not completed within required time limits due to: Other: Tx plan update was due end of May however John Owens last attended therapy here April 2017  She was not in therapy in May or thus far in June to do [id number] plan update  Scheduled for next week and will do at that time             Signatures   Electronically signed by : Lynne Tate, ; Jun 23 2017  8:18AM EST                       (Author) Neck pain and fainting

## 2018-02-25 NOTE — H&P ADULT - ASSESSMENT
37F h/o chiari malformation and cranial cervical instability s/p Occipital to cerviacl fusion here with neck pain found to have screw loosening  - Admit to Dr. Murray  - PReop for OR tomorrow  - Q4hr neurochecks  - Medical clearance  - Pain control  - CT  C spine 37F h/o chiari malformation and cranial cervical instability s/p Occipital to cerviacl fusion here with incisional redness and dehiscence  - Admit to Dr. Murray  - PReop for OR tomorrow  - Q4hr neurochecks  - Medical clearance  - Pain control  - CT  C spine

## 2018-02-25 NOTE — ED ADULT NURSE NOTE - OBJECTIVE STATEMENT
Pt with hx of Chiari Malformation, Su Danlos had spinal fusion surgery 3 weeks ago.  Was told 2 days after the surgery that the screw was not in the right place and that she would need additional surgery.  In addition, the incision site in her posterior neck has been reddened and ttp.  Staples still intact, +redness.  Completed a 10 day course of clinda, denies fevers, abd pain, n/v.  Reports that her surgery is now scheduled for tomorrow and was told to come in today for admission.  HR is in 120s which pt states is normal for her due to her anxiety and POTS.  Pt is otherwise comfortable, no acute distress, abd soft/nt/nd, denies cp, sob, urinary symptoms.

## 2018-02-25 NOTE — CONSULT NOTE ADULT - ASSESSMENT
Check orthostatics 38yo Female with Asthma (no recent exacerbations), Tethered cord syndrome, Su-Danlos syndrome, POTS with h/o syncope, IBS (constipation predominant), chronic lower back pain s/p lumbar laminectomy, cervical disease disease, Chiari I malformation, on 2/5 s/p suboccipital craniectomy, C1 laminectomy, and occipital condyle-C2 posterior craniocervical instrumentation and fusion. She is presenting with redness around surgical site and dehiscence of the central portion of her wound. Plan for surgery on 2/26

## 2018-02-25 NOTE — H&P ADULT - HISTORY OF PRESENT ILLNESS
36yo F pmhx Chiari Malformation, Su Danlos s/p spinal fusion surgery p/w neck pain and fainting speels. Pt. states that "one of the surgical screws is in the wrong place and they have to do the surgery again". Pt. also reports redness around surgical site. Denies fever chills HA CP SOB n/v/d.PAtient denies new weakness, numbness, paresthesias. 38yo F pmhx Chiari Malformation, Su Danlos s/p spinal fusion surgery p/w redness around surgical site and dehiscence of the central portion of her wound. Pt. denies fever chills HA CP SOB n/v/d. Patient denies new weakness, numbness, paresthesias.

## 2018-02-25 NOTE — ED PROVIDER NOTE - MEDICAL DECISION MAKING DETAILS
36yo F pmhx Su Danlos, Chiari Malformation p/w CC surgical complication - no fevers - will send preop labs ct cervical spine and call neurosurgery

## 2018-02-25 NOTE — CONSULT NOTE ADULT - PROBLEM SELECTOR RECOMMENDATION 9
Plan for surgery on 2/26  The cervical wound appears to still be infected. Would start Vancomycin IV to cover for gram positive organisms and MRSA. Consult ID to help with management.     Otherwise, negative coronary CT angiogram (Within the past year), TTE normal earlier this month. Tolerated surgery well with general anesthesia this earlier month. Medically considered low risk for the planned procedure. Plan for surgery on 2/26  The cervical wound appears to still be infected, even after 10 days of clindamycin. Would obtain blood cultures, and start Vancomycin IV to cover for gram positive organisms and MRSA. Consult ID to help with management.     Otherwise, negative coronary CT angiogram (Within the past year), TTE normal earlier this month. Tolerated surgery well with general anesthesia this earlier month. Medically considered low risk for the planned procedure.

## 2018-02-26 ENCOUNTER — APPOINTMENT (OUTPATIENT)
Dept: SPINE | Facility: HOSPITAL | Age: 38
End: 2018-02-26

## 2018-02-26 ENCOUNTER — RESULT REVIEW (OUTPATIENT)
Age: 38
End: 2018-02-26

## 2018-02-26 ENCOUNTER — TRANSCRIPTION ENCOUNTER (OUTPATIENT)
Age: 38
End: 2018-02-26

## 2018-02-26 LAB
ANION GAP SERPL CALC-SCNC: 11 MMOL/L — SIGNIFICANT CHANGE UP (ref 5–17)
BASOPHILS # BLD AUTO: 0 K/UL — SIGNIFICANT CHANGE UP (ref 0–0.2)
BASOPHILS NFR BLD AUTO: 0.4 % — SIGNIFICANT CHANGE UP (ref 0–2)
BUN SERPL-MCNC: 7 MG/DL — SIGNIFICANT CHANGE UP (ref 7–23)
CALCIUM SERPL-MCNC: 7.8 MG/DL — LOW (ref 8.4–10.5)
CHLORIDE SERPL-SCNC: 107 MMOL/L — SIGNIFICANT CHANGE UP (ref 96–108)
CO2 SERPL-SCNC: 20 MMOL/L — LOW (ref 22–31)
CREAT SERPL-MCNC: 0.6 MG/DL — SIGNIFICANT CHANGE UP (ref 0.5–1.3)
EOSINOPHIL # BLD AUTO: 0 K/UL — SIGNIFICANT CHANGE UP (ref 0–0.5)
EOSINOPHIL NFR BLD AUTO: 0.6 % — SIGNIFICANT CHANGE UP (ref 0–6)
GLUCOSE SERPL-MCNC: 142 MG/DL — HIGH (ref 70–99)
HCT VFR BLD CALC: 37.7 % — SIGNIFICANT CHANGE UP (ref 34.5–45)
HGB BLD-MCNC: 12.7 G/DL — SIGNIFICANT CHANGE UP (ref 11.5–15.5)
LYMPHOCYTES # BLD AUTO: 1.1 K/UL — SIGNIFICANT CHANGE UP (ref 1–3.3)
LYMPHOCYTES # BLD AUTO: 14.9 % — SIGNIFICANT CHANGE UP (ref 13–44)
MCHC RBC-ENTMCNC: 31.1 PG — SIGNIFICANT CHANGE UP (ref 27–34)
MCHC RBC-ENTMCNC: 33.8 GM/DL — SIGNIFICANT CHANGE UP (ref 32–36)
MCV RBC AUTO: 92 FL — SIGNIFICANT CHANGE UP (ref 80–100)
MONOCYTES # BLD AUTO: 0.3 K/UL — SIGNIFICANT CHANGE UP (ref 0–0.9)
MONOCYTES NFR BLD AUTO: 3.3 % — SIGNIFICANT CHANGE UP (ref 2–14)
NEUTROPHILS # BLD AUTO: 6.1 K/UL — SIGNIFICANT CHANGE UP (ref 1.8–7.4)
NEUTROPHILS NFR BLD AUTO: 80.7 % — HIGH (ref 43–77)
PLATELET # BLD AUTO: 330 K/UL — SIGNIFICANT CHANGE UP (ref 150–400)
POTASSIUM SERPL-MCNC: 4.5 MMOL/L — SIGNIFICANT CHANGE UP (ref 3.5–5.3)
POTASSIUM SERPL-SCNC: 4.5 MMOL/L — SIGNIFICANT CHANGE UP (ref 3.5–5.3)
RBC # BLD: 4.1 M/UL — SIGNIFICANT CHANGE UP (ref 3.8–5.2)
RBC # FLD: 12.5 % — SIGNIFICANT CHANGE UP (ref 10.3–14.5)
SODIUM SERPL-SCNC: 138 MMOL/L — SIGNIFICANT CHANGE UP (ref 135–145)
WBC # BLD: 7.6 K/UL — SIGNIFICANT CHANGE UP (ref 3.8–10.5)
WBC # FLD AUTO: 7.6 K/UL — SIGNIFICANT CHANGE UP (ref 3.8–10.5)

## 2018-02-26 PROCEDURE — 61783 SCAN PROC SPINAL: CPT | Mod: 80,78

## 2018-02-26 PROCEDURE — 61783 SCAN PROC SPINAL: CPT | Mod: 78

## 2018-02-26 PROCEDURE — 22590 ARTHRD PST TQ CRANIOCERVICAL: CPT | Mod: 80,78

## 2018-02-26 PROCEDURE — 22849 REINSERT SPINAL FIXATION: CPT | Mod: 78

## 2018-02-26 PROCEDURE — 22849 REINSERT SPINAL FIXATION: CPT | Mod: 80,78

## 2018-02-26 PROCEDURE — 99233 SBSQ HOSP IP/OBS HIGH 50: CPT

## 2018-02-26 PROCEDURE — 22590 ARTHRD PST TQ CRANIOCERVICAL: CPT | Mod: 78

## 2018-02-26 PROCEDURE — 88304 TISSUE EXAM BY PATHOLOGIST: CPT | Mod: 26

## 2018-02-26 RX ORDER — DULOXETINE HYDROCHLORIDE 30 MG/1
30 CAPSULE, DELAYED RELEASE ORAL DAILY
Qty: 0 | Refills: 0 | Status: DISCONTINUED | OUTPATIENT
Start: 2018-02-26 | End: 2018-02-28

## 2018-02-26 RX ORDER — TOPIRAMATE 25 MG
100 TABLET ORAL
Qty: 0 | Refills: 0 | Status: DISCONTINUED | OUTPATIENT
Start: 2018-02-26 | End: 2018-02-28

## 2018-02-26 RX ORDER — VANCOMYCIN HCL 1 G
1000 VIAL (EA) INTRAVENOUS ONCE
Qty: 0 | Refills: 0 | Status: COMPLETED | OUTPATIENT
Start: 2018-02-26 | End: 2018-02-27

## 2018-02-26 RX ORDER — DEXTROSE MONOHYDRATE, SODIUM CHLORIDE, AND POTASSIUM CHLORIDE 50; .745; 4.5 G/1000ML; G/1000ML; G/1000ML
1000 INJECTION, SOLUTION INTRAVENOUS
Qty: 0 | Refills: 0 | Status: DISCONTINUED | OUTPATIENT
Start: 2018-02-26 | End: 2018-02-27

## 2018-02-26 RX ORDER — CYCLOBENZAPRINE HYDROCHLORIDE 10 MG/1
10 TABLET, FILM COATED ORAL THREE TIMES A DAY
Qty: 0 | Refills: 0 | Status: DISCONTINUED | OUTPATIENT
Start: 2018-02-26 | End: 2018-02-28

## 2018-02-26 RX ORDER — ONDANSETRON 8 MG/1
4 TABLET, FILM COATED ORAL EVERY 6 HOURS
Qty: 0 | Refills: 0 | Status: DISCONTINUED | OUTPATIENT
Start: 2018-02-26 | End: 2018-02-27

## 2018-02-26 RX ORDER — ACETAMINOPHEN 500 MG
650 TABLET ORAL EVERY 6 HOURS
Qty: 0 | Refills: 0 | Status: DISCONTINUED | OUTPATIENT
Start: 2018-02-26 | End: 2018-02-28

## 2018-02-26 RX ORDER — HYDROMORPHONE HYDROCHLORIDE 2 MG/ML
2 INJECTION INTRAMUSCULAR; INTRAVENOUS; SUBCUTANEOUS EVERY 4 HOURS
Qty: 0 | Refills: 0 | Status: DISCONTINUED | OUTPATIENT
Start: 2018-02-26 | End: 2018-02-28

## 2018-02-26 RX ORDER — ALBUTEROL 90 UG/1
2 AEROSOL, METERED ORAL EVERY 6 HOURS
Qty: 0 | Refills: 0 | Status: DISCONTINUED | OUTPATIENT
Start: 2018-02-26 | End: 2018-02-28

## 2018-02-26 RX ORDER — METOCLOPRAMIDE HCL 10 MG
10 TABLET ORAL ONCE
Qty: 0 | Refills: 0 | Status: DISCONTINUED | OUTPATIENT
Start: 2018-02-26 | End: 2018-02-27

## 2018-02-26 RX ORDER — HYDROMORPHONE HYDROCHLORIDE 2 MG/ML
2 INJECTION INTRAMUSCULAR; INTRAVENOUS; SUBCUTANEOUS EVERY 4 HOURS
Qty: 0 | Refills: 0 | Status: DISCONTINUED | OUTPATIENT
Start: 2018-02-26 | End: 2018-02-26

## 2018-02-26 RX ORDER — ONDANSETRON 8 MG/1
4 TABLET, FILM COATED ORAL EVERY 6 HOURS
Qty: 0 | Refills: 0 | Status: DISCONTINUED | OUTPATIENT
Start: 2018-02-26 | End: 2018-02-28

## 2018-02-26 RX ORDER — SENNA PLUS 8.6 MG/1
2 TABLET ORAL AT BEDTIME
Qty: 0 | Refills: 0 | Status: DISCONTINUED | OUTPATIENT
Start: 2018-02-26 | End: 2018-02-28

## 2018-02-26 RX ORDER — DOCUSATE SODIUM 100 MG
100 CAPSULE ORAL
Qty: 0 | Refills: 0 | Status: DISCONTINUED | OUTPATIENT
Start: 2018-02-26 | End: 2018-02-28

## 2018-02-26 RX ORDER — DIAZEPAM 5 MG
5 TABLET ORAL EVERY 8 HOURS
Qty: 0 | Refills: 0 | Status: DISCONTINUED | OUTPATIENT
Start: 2018-02-26 | End: 2018-02-27

## 2018-02-26 RX ORDER — HYDROMORPHONE HYDROCHLORIDE 2 MG/ML
1 INJECTION INTRAMUSCULAR; INTRAVENOUS; SUBCUTANEOUS EVERY 6 HOURS
Qty: 0 | Refills: 0 | Status: DISCONTINUED | OUTPATIENT
Start: 2018-02-26 | End: 2018-02-27

## 2018-02-26 RX ORDER — ENOXAPARIN SODIUM 100 MG/ML
40 INJECTION SUBCUTANEOUS AT BEDTIME
Qty: 0 | Refills: 0 | Status: DISCONTINUED | OUTPATIENT
Start: 2018-02-27 | End: 2018-02-28

## 2018-02-26 RX ORDER — HYDROMORPHONE HYDROCHLORIDE 2 MG/ML
4 INJECTION INTRAMUSCULAR; INTRAVENOUS; SUBCUTANEOUS EVERY 4 HOURS
Qty: 0 | Refills: 0 | Status: DISCONTINUED | OUTPATIENT
Start: 2018-02-26 | End: 2018-02-26

## 2018-02-26 RX ORDER — HYDROMORPHONE HYDROCHLORIDE 2 MG/ML
0.5 INJECTION INTRAMUSCULAR; INTRAVENOUS; SUBCUTANEOUS
Qty: 0 | Refills: 0 | Status: DISCONTINUED | OUTPATIENT
Start: 2018-02-26 | End: 2018-02-27

## 2018-02-26 RX ADMIN — HYDROMORPHONE HYDROCHLORIDE 4 MILLIGRAM(S): 2 INJECTION INTRAMUSCULAR; INTRAVENOUS; SUBCUTANEOUS at 12:15

## 2018-02-26 RX ADMIN — Medication 5 MILLIGRAM(S): at 00:12

## 2018-02-26 RX ADMIN — Medication 650 MILLIGRAM(S): at 09:50

## 2018-02-26 RX ADMIN — HYDROMORPHONE HYDROCHLORIDE 4 MILLIGRAM(S): 2 INJECTION INTRAMUSCULAR; INTRAVENOUS; SUBCUTANEOUS at 02:00

## 2018-02-26 RX ADMIN — HYDROMORPHONE HYDROCHLORIDE 4 MILLIGRAM(S): 2 INJECTION INTRAMUSCULAR; INTRAVENOUS; SUBCUTANEOUS at 01:22

## 2018-02-26 RX ADMIN — Medication 5 MILLIGRAM(S): at 13:16

## 2018-02-26 RX ADMIN — Medication 100 MILLIGRAM(S): at 06:33

## 2018-02-26 RX ADMIN — Medication 5 MILLIGRAM(S): at 06:33

## 2018-02-26 RX ADMIN — DEXTROSE MONOHYDRATE, SODIUM CHLORIDE, AND POTASSIUM CHLORIDE 75 MILLILITER(S): 50; .745; 4.5 INJECTION, SOLUTION INTRAVENOUS at 00:12

## 2018-02-26 RX ADMIN — DULOXETINE HYDROCHLORIDE 30 MILLIGRAM(S): 30 CAPSULE, DELAYED RELEASE ORAL at 11:46

## 2018-02-26 RX ADMIN — HYDROMORPHONE HYDROCHLORIDE 4 MILLIGRAM(S): 2 INJECTION INTRAMUSCULAR; INTRAVENOUS; SUBCUTANEOUS at 11:46

## 2018-02-26 RX ADMIN — DEXTROSE MONOHYDRATE, SODIUM CHLORIDE, AND POTASSIUM CHLORIDE 75 MILLILITER(S): 50; .745; 4.5 INJECTION, SOLUTION INTRAVENOUS at 23:03

## 2018-02-26 RX ADMIN — Medication 650 MILLIGRAM(S): at 10:20

## 2018-02-26 NOTE — PROVIDER CONTACT NOTE (OTHER) - ASSESSMENT
pt stated she is a 8/10 on pain scale and that she takes dilaudid at home for pain
pt  had BM x3 on the unit this evening

## 2018-02-26 NOTE — BRIEF OPERATIVE NOTE - OPERATION/FINDINGS
s/p removal of hardware; complex closure w/ paraspinal muscle flaps
occipital-cervical wound washout w/ revision of RIGHT condylar screw

## 2018-02-26 NOTE — BRIEF OPERATIVE NOTE - PROCEDURE
<<-----Click on this checkbox to enter Procedure Complex closure of wound  02/26/2018    Active  [id number]

## 2018-02-26 NOTE — PROGRESS NOTE ADULT - PROBLEM SELECTOR PLAN 1
Plan for OR today for washout an closure with plastics.   patient still with erythema at staple site without drainage but with some paraspinal tenderness.    was having fevers about 2 weeks ago resolved s/p clindamycin. would monitor closely post op - consider ID consult. Plan for OR today for washout an closure with plastics.   patient still with erythema at staple site without drainage but with some paraspinal tenderness.    was having fevers about 2 weeks ago resolved s/p clindamycin. would monitor closely post op - consider ID consult if febrile. Plan for OR today for washout an closure with plastics.   patient still with erythema at staple site without drainage but with some paraspinal tenderness.    was having fevers about 2 weeks ago resolved s/p clindamycin. would monitor closely post op - consider ID consult if febrile or leukocytosis. HR mildly elevated without hypoxia or respiratory symptoms - monitor for now - likely related to pain.

## 2018-02-26 NOTE — PROVIDER CONTACT NOTE (OTHER) - ACTION/TREATMENT ORDERED:
MD ordered dilaudid 2mg or 4mg prn. pt is npo except meds. will monitor.
MD stated if pt refuses enema that is fine

## 2018-02-26 NOTE — BRIEF OPERATIVE NOTE - PROCEDURE
<<-----Click on this checkbox to enter Procedure Revision of insertion of hardware, spine, cervical  02/26/2018    Active  DBONDA

## 2018-02-26 NOTE — PROGRESS NOTE ADULT - SUBJECTIVE AND OBJECTIVE BOX
Patient is a 37y old  Female who presents with a chief complaint of Neck pain and fainting (25 Feb 2018 15:18)        SUBJECTIVE / OVERNIGHT EVENTS:      MEDICATIONS  (STANDING):  diazepam    Tablet 5 milliGRAM(s) Oral every 8 hours  docusate sodium 100 milliGRAM(s) Oral three times a day  DULoxetine 30 milliGRAM(s) Oral daily  influenza   Vaccine 0.5 milliLiter(s) IntraMuscular once  sodium chloride 0.9% with potassium chloride 20 mEq/L 1000 milliLiter(s) (75 mL/Hr) IV Continuous <Continuous>  sorbitol 70%/mineral oil/magnesium hydroxide/glycerin Enema 120 milliLiter(s) Rectal once  topiramate 100 milliGRAM(s) Oral two times a day    MEDICATIONS  (PRN):  acetaminophen   Tablet 650 milliGRAM(s) Oral every 6 hours PRN For Temp greater than 38 C (100.4 F)  acetaminophen   Tablet. 650 milliGRAM(s) Oral every 6 hours PRN Mild Pain (1 - 3)  ALBUTerol    90 MICROgram(s) HFA Inhaler 2 Puff(s) Inhalation every 6 hours PRN Shortness of Breath and/or Wheezing  bisacodyl 5 milliGRAM(s) Oral daily PRN Constipation  cyclobenzaprine 10 milliGRAM(s) Oral three times a day PRN Muscle Spasm  diazepam    Tablet 5 milliGRAM(s) Oral daily PRN Anxiety or muscle spasm  HYDROmorphone   Tablet 2 milliGRAM(s) Oral every 4 hours PRN Moderate Pain (4 - 6)  HYDROmorphone   Tablet 4 milliGRAM(s) Oral every 4 hours PRN Severe Pain (7 - 10)  ondansetron Injectable 4 milliGRAM(s) IV Push every 6 hours PRN Nausea and/or Vomiting  senna 2 Tablet(s) Oral at bedtime PRN Constipation      Vital Signs Last 24 Hrs  T(C): 36.4 (26 Feb 2018 09:00), Max: 36.8 (25 Feb 2018 15:00)  T(F): 97.6 (26 Feb 2018 09:00), Max: 98.3 (25 Feb 2018 15:00)  HR: 93 (26 Feb 2018 09:00) (93 - 122)  BP: 108/72 (26 Feb 2018 09:00) (108/72 - 134/83)  BP(mean): --  RR: 16 (26 Feb 2018 09:00) (16 - 20)  SpO2: 98% (26 Feb 2018 09:00) (98% - 100%)  CAPILLARY BLOOD GLUCOSE        I&O's Summary    25 Feb 2018 07:01  -  26 Feb 2018 07:00  --------------------------------------------------------  IN: 495 mL / OUT: 900 mL / NET: -405 mL    26 Feb 2018 07:01  -  26 Feb 2018 09:12  --------------------------------------------------------  IN: 0 mL / OUT: 0 mL / NET: 0 mL        PHYSICAL EXAM:  GENERAL: NAD  HEENT:  Atraumatic, Normocephalic, MMM, no JVD. Neck with limited ROM 2/2 surgery. Posterior cervical surgical wound dehiscence with surrounding erythema   EYES: EOMI, PERRLA, conjunctiva and sclera clear  CHEST/LUNG: Clear to auscultation bilaterally; No wheeze, rhonchi or rales.  HEART: S1, S2, rrr; No murmurs, rubs, or gallops  ABDOMEN: Soft, Nontender, Nondistended; Bowel sounds diminished.  EXTREMITIES:  2+ Peripheral Pulses, No clubbing, cyanosis, or edema  PSYCH: calm  NEUROLOGY: non-focal, AOx3  Skin: erythema at staple site without drainage but with some paraspinal tenderness.      LABS:                        12.4   10.3  )-----------( 394      ( 25 Feb 2018 14:33 )             36.1     02-25    137  |  102  |  10  ----------------------------<  87  3.9   |  23  |  0.62    Ca    8.8      25 Feb 2018 14:33    TPro  6.8  /  Alb  3.6  /  TBili  0.1<L>  /  DBili  x   /  AST  19  /  ALT  24  /  AlkPhos  100  02-25    PT/INR - ( 25 Feb 2018 14:33 )   PT: 11.0 sec;   INR: 1.02 ratio         PTT - ( 25 Feb 2018 14:33 )  PTT:30.7 sec          RADIOLOGY & ADDITIONAL TESTS:    Imaging Personally Reviewed:  Consultant(s) Notes Reviewed:    Care Discussed with Consultants/Other Providers: Patient is a 37y old  Female who presents with a chief complaint of Neck pain and fainting (25 Feb 2018 15:18)        SUBJECTIVE / OVERNIGHT EVENTS: neck pain -4-5/10. no fevers/chills.       MEDICATIONS  (STANDING):  diazepam    Tablet 5 milliGRAM(s) Oral every 8 hours  docusate sodium 100 milliGRAM(s) Oral three times a day  DULoxetine 30 milliGRAM(s) Oral daily  influenza   Vaccine 0.5 milliLiter(s) IntraMuscular once  sodium chloride 0.9% with potassium chloride 20 mEq/L 1000 milliLiter(s) (75 mL/Hr) IV Continuous <Continuous>  sorbitol 70%/mineral oil/magnesium hydroxide/glycerin Enema 120 milliLiter(s) Rectal once  topiramate 100 milliGRAM(s) Oral two times a day    MEDICATIONS  (PRN):  acetaminophen   Tablet 650 milliGRAM(s) Oral every 6 hours PRN For Temp greater than 38 C (100.4 F)  acetaminophen   Tablet. 650 milliGRAM(s) Oral every 6 hours PRN Mild Pain (1 - 3)  ALBUTerol    90 MICROgram(s) HFA Inhaler 2 Puff(s) Inhalation every 6 hours PRN Shortness of Breath and/or Wheezing  bisacodyl 5 milliGRAM(s) Oral daily PRN Constipation  cyclobenzaprine 10 milliGRAM(s) Oral three times a day PRN Muscle Spasm  diazepam    Tablet 5 milliGRAM(s) Oral daily PRN Anxiety or muscle spasm  HYDROmorphone   Tablet 2 milliGRAM(s) Oral every 4 hours PRN Moderate Pain (4 - 6)  HYDROmorphone   Tablet 4 milliGRAM(s) Oral every 4 hours PRN Severe Pain (7 - 10)  ondansetron Injectable 4 milliGRAM(s) IV Push every 6 hours PRN Nausea and/or Vomiting  senna 2 Tablet(s) Oral at bedtime PRN Constipation      Vital Signs Last 24 Hrs  T(C): 36.4 (26 Feb 2018 09:00), Max: 36.8 (25 Feb 2018 15:00)  T(F): 97.6 (26 Feb 2018 09:00), Max: 98.3 (25 Feb 2018 15:00)  HR: 93 (26 Feb 2018 09:00) (93 - 122)  BP: 108/72 (26 Feb 2018 09:00) (108/72 - 134/83)  BP(mean): --  RR: 16 (26 Feb 2018 09:00) (16 - 20)  SpO2: 98% (26 Feb 2018 09:00) (98% - 100%)  CAPILLARY BLOOD GLUCOSE        I&O's Summary    25 Feb 2018 07:01  -  26 Feb 2018 07:00  --------------------------------------------------------  IN: 495 mL / OUT: 900 mL / NET: -405 mL    26 Feb 2018 07:01  -  26 Feb 2018 09:12  --------------------------------------------------------  IN: 0 mL / OUT: 0 mL / NET: 0 mL        PHYSICAL EXAM:  GENERAL: NAD  HEENT:  Atraumatic, Normocephalic, MMM, no JVD. Neck with limited ROM 2/2 surgery. Posterior cervical surgical wound dehiscence with surrounding erythema no drainage  EYES: EOMI, PERRLA, conjunctiva and sclera clear  CHEST/LUNG: Clear to auscultation bilaterally; No wheeze, rhonchi or rales.  HEART: S1, S2, rrr; No murmurs, rubs, or gallops  ABDOMEN: Soft, Nontender, Nondistended; Bowel sounds diminished.  EXTREMITIES:  2+ Peripheral Pulses, No clubbing, cyanosis, or edema  PSYCH: calm  NEUROLOGY: non-focal, AOx3  Skin: cervical site: erythema at staple site without drainage but with some paraspinal tenderness.      LABS:                        12.4   10.3  )-----------( 394      ( 25 Feb 2018 14:33 )             36.1     02-25    137  |  102  |  10  ----------------------------<  87  3.9   |  23  |  0.62    Ca    8.8      25 Feb 2018 14:33    TPro  6.8  /  Alb  3.6  /  TBili  0.1<L>  /  DBili  x   /  AST  19  /  ALT  24  /  AlkPhos  100  02-25    PT/INR - ( 25 Feb 2018 14:33 )   PT: 11.0 sec;   INR: 1.02 ratio         PTT - ( 25 Feb 2018 14:33 )  PTT:30.7 sec          RADIOLOGY & ADDITIONAL TESTS:    Imaging Personally Reviewed: Ct head/cspine 2/13 reviewed - Brain CT:    No acute intracranial hemorrhage, mass effect, or evidence of acute   territorial infarct. No hydrocephalus.    Cervical spine CT:    Redemonstration of cervico-occipital fusion construct. Right occipital   condyle screws is again seen to only approximate the lateral aspect of   the right superior articular facet of C1 and does not traverse the right   occipital condyle. Remaining spinal hardware is well-placed. No evidence   for hardware loosening.    Vertebral body height and alignment maintained.    Evaluation of the spinal canal contents at the levels of surgery is   limited. No gross evidence for spinal canal stenosis or neural foraminal   narrowing within the remaining cervical region.    Consultant(s) Notes Reviewed:    Care Discussed with Consultants/Other Providers: d/w Neurosurgery [facility] regarding plan of care

## 2018-02-27 DIAGNOSIS — R10.10 UPPER ABDOMINAL PAIN, UNSPECIFIED: ICD-10-CM

## 2018-02-27 LAB
ANION GAP SERPL CALC-SCNC: 10 MMOL/L — SIGNIFICANT CHANGE UP (ref 5–17)
BASOPHILS # BLD AUTO: 0 K/UL — SIGNIFICANT CHANGE UP (ref 0–0.2)
BASOPHILS NFR BLD AUTO: 0 % — SIGNIFICANT CHANGE UP (ref 0–2)
BUN SERPL-MCNC: 8 MG/DL — SIGNIFICANT CHANGE UP (ref 7–23)
CALCIUM SERPL-MCNC: 8.5 MG/DL — SIGNIFICANT CHANGE UP (ref 8.4–10.5)
CHLORIDE SERPL-SCNC: 107 MMOL/L — SIGNIFICANT CHANGE UP (ref 96–108)
CO2 SERPL-SCNC: 21 MMOL/L — LOW (ref 22–31)
CREAT SERPL-MCNC: 0.63 MG/DL — SIGNIFICANT CHANGE UP (ref 0.5–1.3)
EOSINOPHIL # BLD AUTO: 0 K/UL — SIGNIFICANT CHANGE UP (ref 0–0.5)
EOSINOPHIL NFR BLD AUTO: 0 % — SIGNIFICANT CHANGE UP (ref 0–6)
GLUCOSE SERPL-MCNC: 147 MG/DL — HIGH (ref 70–99)
HCT VFR BLD CALC: 36.9 % — SIGNIFICANT CHANGE UP (ref 34.5–45)
HGB BLD-MCNC: 11.7 G/DL — SIGNIFICANT CHANGE UP (ref 11.5–15.5)
IMM GRANULOCYTES NFR BLD AUTO: 0.9 % — SIGNIFICANT CHANGE UP (ref 0–1.5)
LYMPHOCYTES # BLD AUTO: 0.87 K/UL — LOW (ref 1–3.3)
LYMPHOCYTES # BLD AUTO: 9.3 % — LOW (ref 13–44)
MCHC RBC-ENTMCNC: 29.3 PG — SIGNIFICANT CHANGE UP (ref 27–34)
MCHC RBC-ENTMCNC: 31.7 GM/DL — LOW (ref 32–36)
MCV RBC AUTO: 92.3 FL — SIGNIFICANT CHANGE UP (ref 80–100)
MONOCYTES # BLD AUTO: 0.34 K/UL — SIGNIFICANT CHANGE UP (ref 0–0.9)
MONOCYTES NFR BLD AUTO: 3.6 % — SIGNIFICANT CHANGE UP (ref 2–14)
NEUTROPHILS # BLD AUTO: 8.1 K/UL — HIGH (ref 1.8–7.4)
NEUTROPHILS NFR BLD AUTO: 86.2 % — HIGH (ref 43–77)
PLATELET # BLD AUTO: 390 K/UL — SIGNIFICANT CHANGE UP (ref 150–400)
POTASSIUM SERPL-MCNC: 4.8 MMOL/L — SIGNIFICANT CHANGE UP (ref 3.5–5.3)
POTASSIUM SERPL-SCNC: 4.8 MMOL/L — SIGNIFICANT CHANGE UP (ref 3.5–5.3)
RBC # BLD: 4 M/UL — SIGNIFICANT CHANGE UP (ref 3.8–5.2)
RBC # FLD: 13.2 % — SIGNIFICANT CHANGE UP (ref 10.3–14.5)
SODIUM SERPL-SCNC: 138 MMOL/L — SIGNIFICANT CHANGE UP (ref 135–145)
WBC # BLD: 9.39 K/UL — SIGNIFICANT CHANGE UP (ref 3.8–10.5)
WBC # FLD AUTO: 9.39 K/UL — SIGNIFICANT CHANGE UP (ref 3.8–10.5)

## 2018-02-27 PROCEDURE — 99233 SBSQ HOSP IP/OBS HIGH 50: CPT

## 2018-02-27 RX ORDER — PANTOPRAZOLE SODIUM 20 MG/1
40 TABLET, DELAYED RELEASE ORAL
Qty: 0 | Refills: 0 | Status: DISCONTINUED | OUTPATIENT
Start: 2018-02-27 | End: 2018-02-28

## 2018-02-27 RX ORDER — FAMOTIDINE 10 MG/ML
20 INJECTION INTRAVENOUS ONCE
Qty: 0 | Refills: 0 | Status: COMPLETED | OUTPATIENT
Start: 2018-02-27 | End: 2018-02-27

## 2018-02-27 RX ORDER — HYDROMORPHONE HYDROCHLORIDE 2 MG/ML
1 INJECTION INTRAMUSCULAR; INTRAVENOUS; SUBCUTANEOUS EVERY 4 HOURS
Qty: 0 | Refills: 0 | Status: DISCONTINUED | OUTPATIENT
Start: 2018-02-27 | End: 2018-02-28

## 2018-02-27 RX ORDER — DIAZEPAM 5 MG
5 TABLET ORAL EVERY 8 HOURS
Qty: 0 | Refills: 0 | Status: DISCONTINUED | OUTPATIENT
Start: 2018-02-27 | End: 2018-02-28

## 2018-02-27 RX ORDER — HYDROMORPHONE HYDROCHLORIDE 2 MG/ML
4 INJECTION INTRAMUSCULAR; INTRAVENOUS; SUBCUTANEOUS EVERY 4 HOURS
Qty: 0 | Refills: 0 | Status: DISCONTINUED | OUTPATIENT
Start: 2018-02-27 | End: 2018-02-28

## 2018-02-27 RX ADMIN — HYDROMORPHONE HYDROCHLORIDE 4 MILLIGRAM(S): 2 INJECTION INTRAMUSCULAR; INTRAVENOUS; SUBCUTANEOUS at 20:41

## 2018-02-27 RX ADMIN — Medication 100 MILLIGRAM(S): at 19:52

## 2018-02-27 RX ADMIN — Medication 250 MILLIGRAM(S): at 05:05

## 2018-02-27 RX ADMIN — HYDROMORPHONE HYDROCHLORIDE 1 MILLIGRAM(S): 2 INJECTION INTRAMUSCULAR; INTRAVENOUS; SUBCUTANEOUS at 05:05

## 2018-02-27 RX ADMIN — HYDROMORPHONE HYDROCHLORIDE 4 MILLIGRAM(S): 2 INJECTION INTRAMUSCULAR; INTRAVENOUS; SUBCUTANEOUS at 14:50

## 2018-02-27 RX ADMIN — HYDROMORPHONE HYDROCHLORIDE 1 MILLIGRAM(S): 2 INJECTION INTRAMUSCULAR; INTRAVENOUS; SUBCUTANEOUS at 05:20

## 2018-02-27 RX ADMIN — Medication 650 MILLIGRAM(S): at 16:48

## 2018-02-27 RX ADMIN — HYDROMORPHONE HYDROCHLORIDE 4 MILLIGRAM(S): 2 INJECTION INTRAMUSCULAR; INTRAVENOUS; SUBCUTANEOUS at 21:10

## 2018-02-27 RX ADMIN — DULOXETINE HYDROCHLORIDE 30 MILLIGRAM(S): 30 CAPSULE, DELAYED RELEASE ORAL at 11:19

## 2018-02-27 RX ADMIN — HYDROMORPHONE HYDROCHLORIDE 1 MILLIGRAM(S): 2 INJECTION INTRAMUSCULAR; INTRAVENOUS; SUBCUTANEOUS at 11:16

## 2018-02-27 RX ADMIN — Medication 100 MILLIGRAM(S): at 05:05

## 2018-02-27 RX ADMIN — Medication 5 MILLIGRAM(S): at 23:09

## 2018-02-27 RX ADMIN — HYDROMORPHONE HYDROCHLORIDE 1 MILLIGRAM(S): 2 INJECTION INTRAMUSCULAR; INTRAVENOUS; SUBCUTANEOUS at 17:10

## 2018-02-27 RX ADMIN — Medication 650 MILLIGRAM(S): at 17:20

## 2018-02-27 RX ADMIN — HYDROMORPHONE HYDROCHLORIDE 1 MILLIGRAM(S): 2 INJECTION INTRAMUSCULAR; INTRAVENOUS; SUBCUTANEOUS at 11:36

## 2018-02-27 RX ADMIN — ENOXAPARIN SODIUM 40 MILLIGRAM(S): 100 INJECTION SUBCUTANEOUS at 23:09

## 2018-02-27 RX ADMIN — HYDROMORPHONE HYDROCHLORIDE 1 MILLIGRAM(S): 2 INJECTION INTRAMUSCULAR; INTRAVENOUS; SUBCUTANEOUS at 16:45

## 2018-02-27 RX ADMIN — Medication 5 MILLIGRAM(S): at 06:38

## 2018-02-27 RX ADMIN — Medication 5 MILLIGRAM(S): at 14:09

## 2018-02-27 RX ADMIN — HYDROMORPHONE HYDROCHLORIDE 2 MILLIGRAM(S): 2 INJECTION INTRAMUSCULAR; INTRAVENOUS; SUBCUTANEOUS at 09:10

## 2018-02-27 RX ADMIN — FAMOTIDINE 20 MILLIGRAM(S): 10 INJECTION INTRAVENOUS at 11:22

## 2018-02-27 RX ADMIN — CYCLOBENZAPRINE HYDROCHLORIDE 10 MILLIGRAM(S): 10 TABLET, FILM COATED ORAL at 11:35

## 2018-02-27 RX ADMIN — HYDROMORPHONE HYDROCHLORIDE 4 MILLIGRAM(S): 2 INJECTION INTRAMUSCULAR; INTRAVENOUS; SUBCUTANEOUS at 14:13

## 2018-02-27 RX ADMIN — HYDROMORPHONE HYDROCHLORIDE 1 MILLIGRAM(S): 2 INJECTION INTRAMUSCULAR; INTRAVENOUS; SUBCUTANEOUS at 23:38

## 2018-02-27 RX ADMIN — HYDROMORPHONE HYDROCHLORIDE 2 MILLIGRAM(S): 2 INJECTION INTRAMUSCULAR; INTRAVENOUS; SUBCUTANEOUS at 08:39

## 2018-02-27 NOTE — PROGRESS NOTE ADULT - SUBJECTIVE AND OBJECTIVE BOX
S: no acute events overnight. pt seen and examined this morning.    O:  T(C): 36.4 (02-27-18 @ 04:45), Max: 36.9 (02-27-18 @ 00:00)  HR: 100 (02-27-18 @ 04:45) (82 - 122)  BP: 119/80 (02-27-18 @ 04:45) (108/72 - 146/91)  RR: 18 (02-27-18 @ 04:45) (11 - 18)  SpO2: 98% (02-27-18 @ 04:45) (96% - 100%)  Wt(kg): --  02-26 @ 07:01  -  02-27 @ 07:00  --------------------------------------------------------  IN:    sodium chloride 0.9% with potassium chloride 20 mEq/L: 600 mL    sodium chloride 0.9% with potassium chloride 20 mEq/L: 300 mL  Total IN: 900 mL    OUT:    Bulb: 25 mL    Indwelling Catheter - Urethral: 1360 mL    Voided: 1400 mL  Total OUT: 2785 mL    Total NET: -1885 mL      Gen: NAD  Neck: dressings c/d/i; DENISE serosang; no collections appreciated

## 2018-02-27 NOTE — PROGRESS NOTE ADULT - SUBJECTIVE AND OBJECTIVE BOX
HPI:  38yo F pmhx Chiari Malformation, Su Danlos s/p spinal fusion surgery p/w redness around surgical site and dehiscence of the central portion of her wound. Pt. denies fever chills HA CP SOB n/v/d. Patient denies new weakness, numbness, paresthesias. (25 Feb 2018 15:18)    OVERNIGHT EVENTS:  No acute events.  This AM complains of epigrastric like pain that extends across the entire thorax in a band like fashion. This pain is likely musculoskeletal in nature from positioning on the OR table and the belt.  Also complains of some incisional pain.    Vital Signs Last 24 Hrs  T(C): 36.4 (27 Feb 2018 09:33), Max: 36.9 (27 Feb 2018 00:00)  T(F): 97.5 (27 Feb 2018 09:33), Max: 98.4 (27 Feb 2018 00:00)  HR: 101 (27 Feb 2018 09:33) (82 - 122)  BP: 116/75 (27 Feb 2018 09:33) (112/80 - 146/91)  BP(mean): 92 (27 Feb 2018 01:30) (88 - 110)  RR: 18 (27 Feb 2018 09:33) (11 - 18)  SpO2: 96% (27 Feb 2018 09:33) (96% - 100%)    I&O's Detail    26 Feb 2018 07:01  -  27 Feb 2018 07:00  --------------------------------------------------------  IN:    sodium chloride 0.9% with potassium chloride 20 mEq/L: 600 mL    sodium chloride 0.9% with potassium chloride 20 mEq/L: 300 mL  Total IN: 900 mL    OUT:    Bulb: 25 mL    Indwelling Catheter - Urethral: 1360 mL    Voided: 1400 mL  Total OUT: 2785 mL    Total NET: -1885 mL        I&O's Summary    26 Feb 2018 07:01  -  27 Feb 2018 07:00  --------------------------------------------------------  IN: 900 mL / OUT: 2785 mL / NET: -1885 mL        PHYSICAL EXAM:  Neurological:  Awake and alert, oriented x3  PERRL, EOMI  VILLARREAL 5/5    Cardiovascular: +s1, s2  Respiratory: clear to auscultation b/l  Gastrointestinal: soft, non-distended, non-tender  Incision/Wound: Clean dressing in place.  Monitored by plastic surgery    TUBES/LINES:  DENISE (25)  Huong    DIET:  Regular    LABS:                        11.7   9.39  )-----------( 390      ( 27 Feb 2018 07:49 )             36.9     02-26    138  |  107  |  7   ----------------------------<  142<H>  4.5   |  20<L>  |  0.60    Ca    7.8<L>      26 Feb 2018 21:46    TPro  6.8  /  Alb  3.6  /  TBili  0.1<L>  /  DBili  x   /  AST  19  /  ALT  24  /  AlkPhos  100  02-25    PT/INR - ( 25 Feb 2018 14:33 )   PT: 11.0 sec;   INR: 1.02 ratio         PTT - ( 25 Feb 2018 14:33 )  PTT:30.7 sec    Allergies    Bee Stings (Anaphylaxis)  Keflex (Anaphylaxis)  latex (Rash)  penicillins (Anaphylaxis)    Intolerances      MEDICATIONS:  Antibiotics:    Neuro:  acetaminophen   Tablet 650 milliGRAM(s) Oral every 6 hours PRN  acetaminophen   Tablet. 650 milliGRAM(s) Oral every 6 hours PRN  cyclobenzaprine 10 milliGRAM(s) Oral three times a day PRN  diazepam    Tablet 5 milliGRAM(s) Oral every 8 hours  DULoxetine 30 milliGRAM(s) Oral daily  HYDROmorphone   Tablet 4 milliGRAM(s) Oral every 4 hours PRN  HYDROmorphone   Tablet 2 milliGRAM(s) Oral every 4 hours PRN  HYDROmorphone  Injectable 1 milliGRAM(s) IV Push every 4 hours PRN  ondansetron Injectable 4 milliGRAM(s) IV Push every 6 hours PRN  topiramate 100 milliGRAM(s) Oral two times a day    Anticoagulation:  enoxaparin Injectable 40 milliGRAM(s) SubCutaneous at bedtime    OTHER:  ALBUTerol    90 MICROgram(s) HFA Inhaler 2 Puff(s) Inhalation every 6 hours PRN  bisacodyl 5 milliGRAM(s) Oral daily PRN  docusate sodium 100 milliGRAM(s) Oral two times a day  famotidine    Tablet 20 milliGRAM(s) Oral once  influenza   Vaccine 0.5 milliLiter(s) IntraMuscular once  pantoprazole    Tablet 40 milliGRAM(s) Oral before breakfast  senna 2 Tablet(s) Oral at bedtime PRN    IVF:  sodium chloride 0.9% with potassium chloride 20 mEq/L 1000 milliLiter(s) IV Continuous <Continuous>    CULTURES:  Culture Results:   No growth at 5 days. (02-13 @ 15:44)  Culture Results:   No growth at 5 days. (02-13 @ 15:44)

## 2018-02-27 NOTE — PROGRESS NOTE ADULT - SUBJECTIVE AND OBJECTIVE BOX
Patient is a 37y old  Female who presents with a chief complaint of Neck pain and fainting (25 Feb 2018 15:18)        SUBJECTIVE / OVERNIGHT EVENTS: c/o 10/10 neck pain. 6/10 bandlike abdominal pain. +BM yesturday.       MEDICATIONS  (STANDING):  docusate sodium 100 milliGRAM(s) Oral two times a day  DULoxetine 30 milliGRAM(s) Oral daily  enoxaparin Injectable 40 milliGRAM(s) SubCutaneous at bedtime  influenza   Vaccine 0.5 milliLiter(s) IntraMuscular once  sodium chloride 0.9% with potassium chloride 20 mEq/L 1000 milliLiter(s) (75 mL/Hr) IV Continuous <Continuous>  topiramate 100 milliGRAM(s) Oral two times a day    MEDICATIONS  (PRN):  acetaminophen   Tablet 650 milliGRAM(s) Oral every 6 hours PRN For Temp greater than 38 C (100.4 F)  acetaminophen   Tablet. 650 milliGRAM(s) Oral every 6 hours PRN Mild Pain (1 - 3)  ALBUTerol    90 MICROgram(s) HFA Inhaler 2 Puff(s) Inhalation every 6 hours PRN Shortness of Breath and/or Wheezing  bisacodyl 5 milliGRAM(s) Oral daily PRN Constipation  cyclobenzaprine 10 milliGRAM(s) Oral three times a day PRN Muscle Spasm  diazepam    Tablet 5 milliGRAM(s) Oral every 8 hours PRN spasm  HYDROmorphone   Tablet 2 milliGRAM(s) Oral every 4 hours PRN Moderate Pain (4 - 6)  HYDROmorphone  Injectable 1 milliGRAM(s) IV Push every 6 hours PRN Severe Pain (7 - 10)  ondansetron Injectable 4 milliGRAM(s) IV Push every 6 hours PRN Nausea and/or Vomiting  senna 2 Tablet(s) Oral at bedtime PRN Constipation      Vital Signs Last 24 Hrs  T(C): 36.4 (27 Feb 2018 04:45), Max: 36.9 (27 Feb 2018 00:00)  T(F): 97.5 (27 Feb 2018 04:45), Max: 98.4 (27 Feb 2018 00:00)  HR: 100 (27 Feb 2018 04:45) (82 - 122)  BP: 119/80 (27 Feb 2018 04:45) (112/80 - 146/91)  BP(mean): 92 (27 Feb 2018 01:30) (88 - 110)  RR: 18 (27 Feb 2018 04:45) (11 - 18)  SpO2: 98% (27 Feb 2018 04:45) (96% - 100%)  CAPILLARY BLOOD GLUCOSE        I&O's Summary    26 Feb 2018 07:01  -  27 Feb 2018 07:00  --------------------------------------------------------  IN: 900 mL / OUT: 2785 mL / NET: -1885 mL        PHYSICAL EXAM:  GENERAL: NAD  HEENT:  Atraumatic, Normocephalic, MMM, no JVD. Neck with limited ROM 2/2 surgery. Posterior cervical surgical wound bandage  EYES: EOMI, PERRLA, conjunctiva and sclera clear  CHEST/LUNG: Clear to auscultation bilaterally; No wheeze, rhonchi or rales.  HEART: S1, S2, rrr; No murmurs, rubs, or gallops  ABDOMEN: Soft, Nontender, Nondistended; Bowel sounds diminished.  EXTREMITIES:  2+ Peripheral Pulses, No clubbing, cyanosis, or edema  PSYCH: calm  NEUROLOGY: non-focal, AOx3  Skin: cervical site: erythema at staple site without drainage but with some paraspinal tenderness.        LABS:                        11.7   9.39  )-----------( 390      ( 27 Feb 2018 07:49 )             36.9     02-26    138  |  107  |  7   ----------------------------<  142<H>  4.5   |  20<L>  |  0.60    Ca    7.8<L>      26 Feb 2018 21:46    TPro  6.8  /  Alb  3.6  /  TBili  0.1<L>  /  DBili  x   /  AST  19  /  ALT  24  /  AlkPhos  100  02-25    PT/INR - ( 25 Feb 2018 14:33 )   PT: 11.0 sec;   INR: 1.02 ratio         PTT - ( 25 Feb 2018 14:33 )  PTT:30.7 sec          RADIOLOGY & ADDITIONAL TESTS:    Imaging Personally Reviewed:  Consultant(s) Notes Reviewed: Plastics   Care Discussed with Consultants/Other Providers: d/w Neurosurgery [facility] regarding plan of care

## 2018-02-27 NOTE — PHYSICAL THERAPY INITIAL EVALUATION ADULT - ADDITIONAL COMMENTS
pt lives with spouse and mother in private house, (+)3 steps to enter, flight of stairs to bedroom. pt independent with mobility, ambulates with straight cane, owns rolling walker

## 2018-02-27 NOTE — PROGRESS NOTE ADULT - PROBLEM SELECTOR PLAN 1
acute bandlike abdominal pain - discussed with neurosurgery - pain is area of belt in OR - likely MS pain - monitor for now.  may be complicated by GERD - pepcid x 1 now, ppi daily

## 2018-02-27 NOTE — PHYSICAL THERAPY INITIAL EVALUATION ADULT - LEVEL OF INDEPENDENCE: STAIR NEGOTIATION, REHAB EVAL
attempted however pt began crying during ambulation due to pain, requesting to sit and pain meds, RN sharad aware, pt not due

## 2018-02-27 NOTE — PHYSICAL THERAPY INITIAL EVALUATION ADULT - PERTINENT HX OF CURRENT PROBLEM, REHAB EVAL
38yo F pmhx Chiari Malformation, Su Danlos s/p spinal fusion surgery p/w redness around surgical site and dehiscence of the central portion of her wound. Pt. denies fever chills HA CP SOB n/v/d. Patient denies new weakness, numbness, paresthesias. CT c/s: (-)Fx, CT brain: (-) s/p revision of occipital condyle-C2 fusion with plastic closure

## 2018-02-28 ENCOUNTER — TRANSCRIPTION ENCOUNTER (OUTPATIENT)
Age: 38
End: 2018-02-28

## 2018-02-28 VITALS
OXYGEN SATURATION: 94 % | SYSTOLIC BLOOD PRESSURE: 121 MMHG | RESPIRATION RATE: 18 BRPM | TEMPERATURE: 98 F | HEART RATE: 95 BPM | DIASTOLIC BLOOD PRESSURE: 74 MMHG

## 2018-02-28 LAB
ANION GAP SERPL CALC-SCNC: 6 MMOL/L — SIGNIFICANT CHANGE UP (ref 5–17)
BASOPHILS # BLD AUTO: 0.02 K/UL — SIGNIFICANT CHANGE UP (ref 0–0.2)
BASOPHILS NFR BLD AUTO: 0.2 % — SIGNIFICANT CHANGE UP (ref 0–2)
BUN SERPL-MCNC: 12 MG/DL — SIGNIFICANT CHANGE UP (ref 7–23)
CALCIUM SERPL-MCNC: 8.8 MG/DL — SIGNIFICANT CHANGE UP (ref 8.4–10.5)
CHLORIDE SERPL-SCNC: 106 MMOL/L — SIGNIFICANT CHANGE UP (ref 96–108)
CO2 SERPL-SCNC: 28 MMOL/L — SIGNIFICANT CHANGE UP (ref 22–31)
CREAT SERPL-MCNC: 0.74 MG/DL — SIGNIFICANT CHANGE UP (ref 0.5–1.3)
EOSINOPHIL # BLD AUTO: 0.17 K/UL — SIGNIFICANT CHANGE UP (ref 0–0.5)
EOSINOPHIL NFR BLD AUTO: 1.8 % — SIGNIFICANT CHANGE UP (ref 0–6)
GLUCOSE SERPL-MCNC: 99 MG/DL — SIGNIFICANT CHANGE UP (ref 70–99)
HCT VFR BLD CALC: 33.6 % — LOW (ref 34.5–45)
HGB BLD-MCNC: 10.2 G/DL — LOW (ref 11.5–15.5)
IMM GRANULOCYTES NFR BLD AUTO: 0.5 % — SIGNIFICANT CHANGE UP (ref 0–1.5)
LYMPHOCYTES # BLD AUTO: 2.79 K/UL — SIGNIFICANT CHANGE UP (ref 1–3.3)
LYMPHOCYTES # BLD AUTO: 28.9 % — SIGNIFICANT CHANGE UP (ref 13–44)
MCHC RBC-ENTMCNC: 28.7 PG — SIGNIFICANT CHANGE UP (ref 27–34)
MCHC RBC-ENTMCNC: 30.4 GM/DL — LOW (ref 32–36)
MCV RBC AUTO: 94.6 FL — SIGNIFICANT CHANGE UP (ref 80–100)
MONOCYTES # BLD AUTO: 0.98 K/UL — HIGH (ref 0–0.9)
MONOCYTES NFR BLD AUTO: 10.1 % — SIGNIFICANT CHANGE UP (ref 2–14)
NEUTROPHILS # BLD AUTO: 5.66 K/UL — SIGNIFICANT CHANGE UP (ref 1.8–7.4)
NEUTROPHILS NFR BLD AUTO: 58.5 % — SIGNIFICANT CHANGE UP (ref 43–77)
PLATELET # BLD AUTO: 331 K/UL — SIGNIFICANT CHANGE UP (ref 150–400)
POTASSIUM SERPL-MCNC: 3.7 MMOL/L — SIGNIFICANT CHANGE UP (ref 3.5–5.3)
POTASSIUM SERPL-SCNC: 3.7 MMOL/L — SIGNIFICANT CHANGE UP (ref 3.5–5.3)
RBC # BLD: 3.55 M/UL — LOW (ref 3.8–5.2)
RBC # FLD: 13.4 % — SIGNIFICANT CHANGE UP (ref 10.3–14.5)
SODIUM SERPL-SCNC: 140 MMOL/L — SIGNIFICANT CHANGE UP (ref 135–145)
WBC # BLD: 9.67 K/UL — SIGNIFICANT CHANGE UP (ref 3.8–10.5)
WBC # FLD AUTO: 9.67 K/UL — SIGNIFICANT CHANGE UP (ref 3.8–10.5)

## 2018-02-28 PROCEDURE — 80048 BASIC METABOLIC PNL TOTAL CA: CPT

## 2018-02-28 PROCEDURE — 80053 COMPREHEN METABOLIC PANEL: CPT

## 2018-02-28 PROCEDURE — 84702 CHORIONIC GONADOTROPIN TEST: CPT

## 2018-02-28 PROCEDURE — 99285 EMERGENCY DEPT VISIT HI MDM: CPT | Mod: 25

## 2018-02-28 PROCEDURE — 93005 ELECTROCARDIOGRAM TRACING: CPT

## 2018-02-28 PROCEDURE — 76000 FLUOROSCOPY <1 HR PHYS/QHP: CPT

## 2018-02-28 PROCEDURE — 85610 PROTHROMBIN TIME: CPT

## 2018-02-28 PROCEDURE — C1713: CPT

## 2018-02-28 PROCEDURE — 97116 GAIT TRAINING THERAPY: CPT

## 2018-02-28 PROCEDURE — 88304 TISSUE EXAM BY PATHOLOGIST: CPT

## 2018-02-28 PROCEDURE — 72125 CT NECK SPINE W/O DYE: CPT

## 2018-02-28 PROCEDURE — 99239 HOSP IP/OBS DSCHRG MGMT >30: CPT

## 2018-02-28 PROCEDURE — 85027 COMPLETE CBC AUTOMATED: CPT

## 2018-02-28 PROCEDURE — 86900 BLOOD TYPING SEROLOGIC ABO: CPT

## 2018-02-28 PROCEDURE — 97162 PT EVAL MOD COMPLEX 30 MIN: CPT

## 2018-02-28 PROCEDURE — 97110 THERAPEUTIC EXERCISES: CPT

## 2018-02-28 PROCEDURE — 85730 THROMBOPLASTIN TIME PARTIAL: CPT

## 2018-02-28 PROCEDURE — 86901 BLOOD TYPING SEROLOGIC RH(D): CPT

## 2018-02-28 PROCEDURE — C1889: CPT

## 2018-02-28 PROCEDURE — 86850 RBC ANTIBODY SCREEN: CPT

## 2018-02-28 RX ORDER — ACETAMINOPHEN 500 MG
2 TABLET ORAL
Qty: 0 | Refills: 0 | COMMUNITY
Start: 2018-02-28

## 2018-02-28 RX ORDER — DULOXETINE HYDROCHLORIDE 30 MG/1
1 CAPSULE, DELAYED RELEASE ORAL
Qty: 0 | Refills: 0 | COMMUNITY
Start: 2018-02-28

## 2018-02-28 RX ORDER — ALBUTEROL 90 UG/1
2 AEROSOL, METERED ORAL
Qty: 0 | Refills: 0 | COMMUNITY

## 2018-02-28 RX ORDER — DOCUSATE SODIUM 100 MG
1 CAPSULE ORAL
Qty: 0 | Refills: 0 | COMMUNITY
Start: 2018-02-28

## 2018-02-28 RX ORDER — DOCUSATE SODIUM 100 MG
1 CAPSULE ORAL
Qty: 0 | Refills: 0 | COMMUNITY

## 2018-02-28 RX ORDER — PANTOPRAZOLE SODIUM 20 MG/1
1 TABLET, DELAYED RELEASE ORAL
Qty: 0 | Refills: 0 | COMMUNITY
Start: 2018-02-28

## 2018-02-28 RX ORDER — ALBUTEROL 90 UG/1
2 AEROSOL, METERED ORAL
Qty: 0 | Refills: 0 | COMMUNITY
Start: 2018-02-28

## 2018-02-28 RX ORDER — DULOXETINE HYDROCHLORIDE 30 MG/1
1 CAPSULE, DELAYED RELEASE ORAL
Qty: 0 | Refills: 0 | COMMUNITY

## 2018-02-28 RX ORDER — DIAZEPAM 5 MG
1 TABLET ORAL
Qty: 30 | Refills: 0
Start: 2018-02-28

## 2018-02-28 RX ORDER — HYDROMORPHONE HYDROCHLORIDE 2 MG/ML
1 INJECTION INTRAMUSCULAR; INTRAVENOUS; SUBCUTANEOUS
Qty: 30 | Refills: 0
Start: 2018-02-28

## 2018-02-28 RX ORDER — SENNA PLUS 8.6 MG/1
2 TABLET ORAL
Qty: 0 | Refills: 0 | COMMUNITY
Start: 2018-02-28

## 2018-02-28 RX ADMIN — Medication 650 MILLIGRAM(S): at 07:40

## 2018-02-28 RX ADMIN — HYDROMORPHONE HYDROCHLORIDE 4 MILLIGRAM(S): 2 INJECTION INTRAMUSCULAR; INTRAVENOUS; SUBCUTANEOUS at 11:38

## 2018-02-28 RX ADMIN — HYDROMORPHONE HYDROCHLORIDE 4 MILLIGRAM(S): 2 INJECTION INTRAMUSCULAR; INTRAVENOUS; SUBCUTANEOUS at 07:40

## 2018-02-28 RX ADMIN — Medication 100 MILLIGRAM(S): at 06:00

## 2018-02-28 RX ADMIN — HYDROMORPHONE HYDROCHLORIDE 4 MILLIGRAM(S): 2 INJECTION INTRAMUSCULAR; INTRAVENOUS; SUBCUTANEOUS at 07:08

## 2018-02-28 RX ADMIN — Medication 650 MILLIGRAM(S): at 07:07

## 2018-02-28 RX ADMIN — PANTOPRAZOLE SODIUM 40 MILLIGRAM(S): 20 TABLET, DELAYED RELEASE ORAL at 06:00

## 2018-02-28 RX ADMIN — DULOXETINE HYDROCHLORIDE 30 MILLIGRAM(S): 30 CAPSULE, DELAYED RELEASE ORAL at 11:39

## 2018-02-28 RX ADMIN — Medication 300 MILLIGRAM(S): at 11:25

## 2018-02-28 RX ADMIN — HYDROMORPHONE HYDROCHLORIDE 1 MILLIGRAM(S): 2 INJECTION INTRAMUSCULAR; INTRAVENOUS; SUBCUTANEOUS at 00:00

## 2018-02-28 RX ADMIN — HYDROMORPHONE HYDROCHLORIDE 2 MILLIGRAM(S): 2 INJECTION INTRAMUSCULAR; INTRAVENOUS; SUBCUTANEOUS at 10:00

## 2018-02-28 RX ADMIN — HYDROMORPHONE HYDROCHLORIDE 2 MILLIGRAM(S): 2 INJECTION INTRAMUSCULAR; INTRAVENOUS; SUBCUTANEOUS at 09:22

## 2018-02-28 RX ADMIN — Medication 5 MILLIGRAM(S): at 06:00

## 2018-02-28 RX ADMIN — Medication 650 MILLIGRAM(S): at 11:26

## 2018-02-28 NOTE — PROGRESS NOTE ADULT - ASSESSMENT
38yo Female with Asthma (no recent exacerbations), Tethered cord syndrome, Su-Danlos syndrome, POTS with h/o syncope, IBS (constipation predominant), chronic lower back pain s/p lumbar laminectomy, cervical disease disease, Chiari I malformation, on 2/5 s/p suboccipital craniectomy, C1 laminectomy, and occipital condyle-C2 posterior craniocervical instrumentation and fusion. She is presenting with redness around surgical site and dehiscence of the central portion of her wound s/p washout 2/26
36yo F pmhx Chiari Malformation, Su Danlos s/p spinal fusion surgery p/w redness around surgical site and dehiscence of the central portion of her wound. Pt. denies fever chills HA CP SOB n/v/d. Patient denies new weakness, numbness, paresthesias.     PROCEDURE: 2/27 s/p Complex closure of wound  Revision of insertion of hardware, spine, cervical     POD#1    PLAN:  Neuro:   -maintain DENISE drain as per plastics. Record drainage of DENISE while at home.  - f.u with Plastic surgeon Dr Mtz as directed  - pain control continue dilaudid prn with valium prn  - depression/anxiety- continue cymbalta, topimax  - started on clindamycin, pt has pcn allergy  - pt instructed to take probiotic with clindamycin  Respiratory:   - encouraged incentive spirometry   DVT ppxI:    - venodynes, chemoprophylaxis  PT/OT: outpatient PT, dc home today    mInfo # [id number]
38yo Female with Asthma (no recent exacerbations), Tethered cord syndrome, Su-Danlos syndrome, POTS with h/o syncope, IBS (constipation predominant), chronic lower back pain s/p lumbar laminectomy, cervical disease disease, Chiari I malformation, on 2/5 s/p suboccipital craniectomy, C1 laminectomy, and occipital condyle-C2 posterior craniocervical instrumentation and fusion. She is presenting with redness around surgical site and dehiscence of the central portion of her wound. Plan for surgery on 2/26
38yo Female with Asthma (no recent exacerbations), Tethered cord syndrome, Su-Danlos syndrome, POTS with h/o syncope, IBS (constipation predominant), chronic lower back pain s/p lumbar laminectomy, cervical disease disease, Chiari I malformation, on 2/5 s/p suboccipital craniectomy, C1 laminectomy, and occipital condyle-C2 posterior craniocervical instrumentation and fusion. She is presenting with redness around surgical site and dehiscence of the central portion of her wound. Plan for surgery on 2/26
A/P: 37F s/p revision of occipital condyle C2 craniocervical instrumentation washout and closure POD1. Doing well.    -continue abx  -continue drain  -may discharge to home with drain from PRS perspective  -care per neurosurgery
A/P: 37F s/p revision of occipital condyle C2 craniocervical instrumentation washout and closure POD2. Doing well.    -continue abx (may go home with keflex)  -will change dressing today  -continue drain  -may discharge to home with drain from PRS perspective  -Follow up with Dr. Mtz on Friday after discharge from the hospital. Patient may call [phone] to schedule an appointment.  -care per neurosurgery
HPI:  38yo F pmhx Chiari Malformation, Su Danlos s/p spinal fusion surgery p/w redness around surgical site and dehiscence of the central portion of her wound. Pt. denies fever chills HA CP SOB n/v/d. Patient denies new weakness, numbness, paresthesias. (25 Feb 2018 15:18)    PROCEDURE: Complex closure of wound  Revision of insertion of hardware, spine, cervical     POD#1 OCC-C2 fusion revision with plastics closure    PLAN:  Neuro:   -Changed valium to standing with hold for sedation orders.  Added 4mg of Dilaudid PO for severe pain and increased frequency of IV break through from every 6 hours to every 4 in this acute post op phase.  -OOB/PT today  -DENISE drain in place.  Will continue per plastic surgery    Respiratory:   -No acute issues.  History of intermittent asthma.  PRN Albuterol available.  -IS encouraged     CV:  -History of POTS.  No acute issues    Endocrine:   -No acute issues    Heme/Onc:              -DVT ppx with SCDs, SQL and frequent ambulation    Renal:   -IVL  -PAULETTE leyva today    ID:   -Afebrile  -No leukocytosis    GI:   -1 time dose of Pepcid for possible gastritis and starting protonix daily  -Tolerating regular diet  -Colace and senna for bowel regiment    Discharge planning:   PT-pending today

## 2018-02-28 NOTE — PROGRESS NOTE ADULT - PROBLEM SELECTOR PROBLEM 4
POTS (postural orthostatic tachycardia syndrome)
Mild intermittent asthma without complication
POTS (postural orthostatic tachycardia syndrome)

## 2018-02-28 NOTE — DISCHARGE NOTE ADULT - PLAN OF CARE
2/26 s/p complex closure of wound with plastic surgery, hardware revision Call and make an appointment with plastic surgeon Dr Mtz on Friday [phone]. Monitor and record drainage daily. Make appointment with Dr Murray/Angie [phone] in 1-2 weeks. stable Follow up with your Primary Care Physician in 1 week. Call and make an appointment with plastic surgeon Dr Mtz on Friday [phone]. Monitor and record drainage daily. Make appointment with Dr Murray  [phone] on Wednesday next week. You may shower after cleared by Dr Mtz. Keep dressing clean and dry. Take probiotic while on antibiotics.

## 2018-02-28 NOTE — PROGRESS NOTE ADULT - PROBLEM SELECTOR PLAN 2
s/p OR for washout and closure by plastics  cervical spine pain - changed to oral dilaudid prn - management per neurosurgery  d/w Neurosurgery - no s/s of infection and surgical site not c/w infection - patient remains afebrile, no leukocytosis. Per plastics note, recommend course of keflex  HR improved. no hypoxia or respiratory symptoms - likely related to pain.
due to IBS and use of diauldid for pain.  Continue laxatives.
s/p OR for washout and closure by plastics  cervical spine pain - dilaudid 1mg IV Q4 prn severe pain, dilaudid 2mg po Q4 prn moderate pain.   d/w Neurosurgery - no s/s of infection and surgical site not c/w infection - patient remains afebrile, no leukocytosis -continue to monitor   would consider ID consult if febrile or leukocytosis. HR improved. no hypoxia or respiratory symptoms - likely related to pain.

## 2018-02-28 NOTE — PROGRESS NOTE ADULT - SUBJECTIVE AND OBJECTIVE BOX
SUBJECTIVE: Pt seen and examined. Wants to go home    OVERNIGHT EVENTS: none    Vital Signs Last 24 Hrs  T(C): 36.8 (28 Feb 2018 09:25), Max: 36.8 (27 Feb 2018 16:38)  T(F): 98.2 (28 Feb 2018 09:25), Max: 98.3 (27 Feb 2018 20:37)  HR: 95 (28 Feb 2018 09:25) (92 - 104)  BP: 121/74 (28 Feb 2018 09:25) (101/61 - 128/84)  BP(mean): --  RR: 18 (28 Feb 2018 09:25) (18 - 18)  SpO2: 94% (28 Feb 2018 09:25) (93% - 99%)    PHYSICAL EXAM:    General: No Acute Distress     Neurological: Awake, alert oriented to person, place and time, Following Commands, Speech Fluent, Moving all extremities, Muscle Strength normal in all four extremities, No Drift, Sensation to Light Touch Intact    Pulmonary: Clear to Auscultation, No Rales, No Rhonchi, No Wheezes     Cardiovascular: S1, S2, Regular Rate and Rhythm     Gastrointestinal: Soft, Nontender, Nondistended     Extremities: No calf tenderness     Incision: dressing c/d/i    LABS:                        11.7   9.39  )-----------( 390      ( 27 Feb 2018 07:49 )             36.9    02-28    140  |  106  |  12  ----------------------------<  99  3.7   |  28  |  0.74    Ca    8.8      28 Feb 2018 07:26          02-27 @ 07:01  -  02-28 @ 07:00  --------------------------------------------------------  IN: 2166 mL / OUT: 3330 mL / NET: -1164 mL      DRAINS: DENISE (30ml/24 hrs)    MEDICATIONS:  Antibiotics:  clindamycin   Capsule 300 milliGRAM(s) Oral three times a day    Neuro:  acetaminophen   Tablet 650 milliGRAM(s) Oral every 6 hours PRN For Temp greater than 38 C (100.4 F)  acetaminophen   Tablet. 650 milliGRAM(s) Oral every 6 hours PRN Mild Pain (1 - 3)  cyclobenzaprine 10 milliGRAM(s) Oral three times a day PRN Muscle Spasm  diazepam    Tablet 5 milliGRAM(s) Oral every 8 hours  DULoxetine 30 milliGRAM(s) Oral daily  HYDROmorphone   Tablet 4 milliGRAM(s) Oral every 4 hours PRN Severe Pain (7 - 10)  HYDROmorphone   Tablet 2 milliGRAM(s) Oral every 4 hours PRN Moderate Pain (4 - 6)  ondansetron Injectable 4 milliGRAM(s) IV Push every 6 hours PRN Nausea and/or Vomiting  topiramate 100 milliGRAM(s) Oral two times a day    Cardiac:    Pulm:  ALBUTerol    90 MICROgram(s) HFA Inhaler 2 Puff(s) Inhalation every 6 hours PRN Shortness of Breath and/or Wheezing    GI/:  bisacodyl 5 milliGRAM(s) Oral daily PRN Constipation  docusate sodium 100 milliGRAM(s) Oral two times a day  pantoprazole    Tablet 40 milliGRAM(s) Oral before breakfast  senna 2 Tablet(s) Oral at bedtime PRN Constipation    Other:   enoxaparin Injectable 40 milliGRAM(s) SubCutaneous at bedtime  influenza   Vaccine 0.5 milliLiter(s) IntraMuscular once    DIET: [x] Regular [] CCD [] Renal [] Puree [] Dysphagia [] Tube Feeds:     IMAGING:

## 2018-02-28 NOTE — DISCHARGE NOTE ADULT - MEDICATION SUMMARY - MEDICATIONS TO TAKE
I will START or STAY ON the medications listed below when I get home from the hospital:    acetaminophen 325 mg oral tablet  -- 2 tab(s) by mouth every 6 hours, As needed, Mild Pain (1 - 3)  -- Indication: For Mild pain    HYDROmorphone 4 mg oral tablet  -- 1 tab(s) by mouth every 4 hours, As needed, Severe Pain (7 - 10) MDD:5  -- Indication: For severe pain    Topamax 100 mg oral tablet  -- 1 tab(s) by mouth 2 times a day  -- Indication: For anxiety    diazePAM 5 mg oral tablet  -- 1 tab(s) by mouth every 8 hours, As Needed -for muscle spasm MDD:3   -- Indication: For Muscle spasm    DULoxetine 30 mg oral delayed release capsule  -- 1 cap(s) by mouth once a day  -- Indication: For anxiety    albuterol 90 mcg/inh inhalation aerosol  -- 2 puff(s) inhaled every 6 hours, As needed, Shortness of Breath and/or Wheezing  -- Indication: For asthma    senna oral tablet  -- 2 tab(s) by mouth once a day (at bedtime), As needed, Constipation  -- Indication: For Constipation    docusate sodium 100 mg oral capsule  -- 1 cap(s) by mouth 3 times a day  -- Indication: For Constipation    clindamycin 300 mg oral capsule  -- 1 cap(s) by mouth 3 times a day  -- Indication: For wound     pantoprazole 40 mg oral delayed release tablet  -- 1 tab(s) by mouth once a day (before a meal)  -- Indication: For reflux

## 2018-02-28 NOTE — PROGRESS NOTE ADULT - PROBLEM SELECTOR PROBLEM 3
Irritable bowel syndrome with constipation
Irritable bowel syndrome with constipation
POTS (postural orthostatic tachycardia syndrome)

## 2018-02-28 NOTE — DISCHARGE NOTE ADULT - CARE PLAN
Principal Discharge DX:	Disruption of external surgical wound, initial encounter  Goal:	2/26 s/p complex closure of wound with plastic surgery, hardware revision  Assessment and plan of treatment:	Call and make an appointment with plastic surgeon Dr Mtz on Friday [phone]. Monitor and record drainage daily. Make appointment with Dr Murray/Angie [phone] in 1-2 weeks.  Secondary Diagnosis:	EDS (Su-Danlos syndrome)  Goal:	stable  Assessment and plan of treatment:	Follow up with your Primary Care Physician in 1 week. Principal Discharge DX:	Disruption of external surgical wound, initial encounter  Goal:	2/26 s/p complex closure of wound with plastic surgery, hardware revision  Assessment and plan of treatment:	Call and make an appointment with plastic surgeon Dr Mtz on Friday [phone]. Monitor and record drainage daily. Make appointment with Dr Murray  [phone] on Wednesday next week. You may shower after cleared by Dr Mtz. Keep dressing clean and dry. Take probiotic while on antibiotics.  Secondary Diagnosis:	EDS (Su-Danlos syndrome)  Goal:	stable  Assessment and plan of treatment:	Follow up with your Primary Care Physician in 1 week.

## 2018-02-28 NOTE — DISCHARGE NOTE ADULT - NS AS ACTIVITY OBS
No Heavy lifting/straining/Do not drive or operate machinery/Do not make important decisions/Showering allowed/You may shower and wash your hair on post op day #4 (Friday) Do not make important decisions/Do not drive or operate machinery/No Heavy lifting/straining

## 2018-02-28 NOTE — PROGRESS NOTE ADULT - PROBLEM SELECTOR PLAN 3
due to IBS and use of diauldid for pain.  Continue laxatives - no bm x 2 days
Not orthostatic. Doing well currently.
due to IBS and use of diauldid for pain.  Continue laxatives.

## 2018-02-28 NOTE — PROGRESS NOTE ADULT - PROBLEM SELECTOR PLAN 4
Not orthostatic. Doing well currently.
No exacerbation currently. Lungs are clear to auscultation, no wheezing, saturation 100% on RA.  proventil inhaler prn
Not orthostatic. Doing well currently.

## 2018-02-28 NOTE — DISCHARGE NOTE ADULT - PROVIDER TOKENS
TOKEN:'[id number]',FREE:[LAST:[Primary Care Physician],PHONE:[(   )    -],FAX:[(   )    -]] FREE:[LAST:[Primary Care Physician],PHONE:[(   )    -],FAX:[(   )    -]],TOKEN:'[id number]'

## 2018-02-28 NOTE — PROGRESS NOTE ADULT - PROVIDER SPECIALTY LIST ADULT
Hospitalist
Hospitalist
Neurosurgery
Plastic Surgery
Plastic Surgery
Hospitalist

## 2018-02-28 NOTE — PROGRESS NOTE ADULT - ATTENDING COMMENTS
I saw and evaluated the patient. The patient is a 37-year-old female with a history of Chiari 1 malformation and Su-Danlos syndrome s/p suboccipital craniectomy, C1 laminectomy, and occipital condyle-C2 posterior craniocervical instrumentation and fusion on 2/5/18 and s/p revision of occipital condyle-C2 posterior craniocervical instrumentation and fusion with wound debridement and Plastics closure on 2/26/18.  The patient is currently neurologically stable. DENISE per Plastic Surgery. D/C Borja catheter when more ambulatory/OOB and F/U TOV. Begin mobilization OOB with PT and disposition planning.
I saw and evaluated the patient. The patient is a 37-year-old female with a history of Chiari 1 malformation and Su-Danlos syndrome s/p suboccipital craniectomy, C1 laminectomy, and occipital condyle-C2 posterior craniocervical instrumentation and fusion on 2/5/18 and s/p revision of occipital condyle-C2 posterior craniocervical instrumentation and fusion with wound debridement and Plastics closure on 2/26/18.  The patient is currently neurologically stable. F/U Plastics, re: possible D/C drain today. Continue mobilization OOB with PT, and plan for disposition home with outpatient today.
I saw and evaluated the patient. The patient is a 37-year-old female with a history of Chiari 1 malformation and Su-Danlos syndrome s/p suboccipital craniectomy, C1 laminectomy, and occipital condyle-C2 posterior craniocervical instrumentation and fusion on 2/5/18 who presents with redness around her surgical site and dehiscence of the middle portion of her wound.  The patient is currently neurologically stable.  The patient is planned for a wound washout and revision of her posterior craniocervical instrumentation and fusion with Plastics closure today. The risks, benefits, and alternatives to surgery were discussed with both the patient and her , who both expressed understanding, and both wish to proceed with surgery.
Pt seen and examined. Agree with above    Dressing cdi, gladis serosang    Plan - POD#1  Check cultures  Cont GLADIS  Keep area dry  Ok to d/c from PRS with drain and PO abx
[facility]  [phone]
discharge planning today per neurosurgery  Dr. AMBIKA Almaguer  [facility] Hospitalist  [phone]
[facility]  [phone]

## 2018-02-28 NOTE — DISCHARGE NOTE ADULT - ADDITIONAL INSTRUCTIONS
Any sign of fever, chills, or severe pain not relieved with pain medication, contact Dr Adams/Terri or go to ER.

## 2018-02-28 NOTE — PROGRESS NOTE ADULT - SUBJECTIVE AND OBJECTIVE BOX
Patient is a 38y old  Female who presents with a chief complaint of Neck pain and fainting (25 Feb 2018 15:18)        SUBJECTIVE / OVERNIGHT EVENTS: neck pain 5-6/10 improves with pain medication to 2/10. no fevers/chills. no abdominal pain today.       MEDICATIONS  (STANDING):  diazepam    Tablet 5 milliGRAM(s) Oral every 8 hours  docusate sodium 100 milliGRAM(s) Oral two times a day  DULoxetine 30 milliGRAM(s) Oral daily  enoxaparin Injectable 40 milliGRAM(s) SubCutaneous at bedtime  influenza   Vaccine 0.5 milliLiter(s) IntraMuscular once  pantoprazole    Tablet 40 milliGRAM(s) Oral before breakfast  topiramate 100 milliGRAM(s) Oral two times a day    MEDICATIONS  (PRN):  acetaminophen   Tablet 650 milliGRAM(s) Oral every 6 hours PRN For Temp greater than 38 C (100.4 F)  acetaminophen   Tablet. 650 milliGRAM(s) Oral every 6 hours PRN Mild Pain (1 - 3)  ALBUTerol    90 MICROgram(s) HFA Inhaler 2 Puff(s) Inhalation every 6 hours PRN Shortness of Breath and/or Wheezing  bisacodyl 5 milliGRAM(s) Oral daily PRN Constipation  cyclobenzaprine 10 milliGRAM(s) Oral three times a day PRN Muscle Spasm  HYDROmorphone   Tablet 4 milliGRAM(s) Oral every 4 hours PRN Severe Pain (7 - 10)  HYDROmorphone   Tablet 2 milliGRAM(s) Oral every 4 hours PRN Moderate Pain (4 - 6)  ondansetron Injectable 4 milliGRAM(s) IV Push every 6 hours PRN Nausea and/or Vomiting  senna 2 Tablet(s) Oral at bedtime PRN Constipation      Vital Signs Last 24 Hrs  T(C): 36.7 (28 Feb 2018 05:59), Max: 36.8 (27 Feb 2018 16:38)  T(F): 98 (28 Feb 2018 05:59), Max: 98.3 (27 Feb 2018 20:37)  HR: 92 (28 Feb 2018 05:59) (92 - 104)  BP: 111/70 (28 Feb 2018 05:59) (101/61 - 128/84)  BP(mean): --  RR: 18 (28 Feb 2018 05:59) (18 - 18)  SpO2: 95% (28 Feb 2018 05:59) (93% - 99%)  CAPILLARY BLOOD GLUCOSE        I&O's Summary    27 Feb 2018 07:01  -  28 Feb 2018 07:00  --------------------------------------------------------  IN: 2166 mL / OUT: 3330 mL / NET: -1164 mL        PPHYSICAL EXAM:  GENERAL: NAD  HEENT:  Atraumatic, Normocephalic, MMM, no JVD. Neck with limited ROM 2/2 surgery. Posterior cervical surgical wound bandage  EYES: EOMI, PERRLA, conjunctiva and sclera clear  CHEST/LUNG: Clear to auscultation bilaterally; No wheeze, rhonchi or rales.  HEART: S1, S2, rrr; No murmurs, rubs, or gallops  ABDOMEN: Soft, Nontender, Nondistended; Bowel sounds diminished.  EXTREMITIES:  2+ Peripheral Pulses, No clubbing, cyanosis, or edema  PSYCH: calm  NEUROLOGY: non-focal, AOx3  Skin: cervical site: bandage over cervical area. mild tenderness to palpation    LABS:                        11.7   9.39  )-----------( 390      ( 27 Feb 2018 07:49 )             36.9     02-28    140  |  106  |  12  ----------------------------<  99  3.7   |  28  |  0.74    Ca    8.8      28 Feb 2018 07:26                RADIOLOGY & ADDITIONAL TESTS:    Imaging Personally Reviewed:  Consultant(s) Notes Reviewed: Plastics   Care Discussed with Consultants/Other Providers: d/w Neurosurgery ISH Hoyt regarding plan of care

## 2018-02-28 NOTE — DISCHARGE NOTE ADULT - PATIENT PORTAL LINK FT
You can access the [facility] Patient Portal, offered by [facility], by registering with the following website: [email or web address]

## 2018-02-28 NOTE — PROGRESS NOTE ADULT - SUBJECTIVE AND OBJECTIVE BOX
S: no acute events overnight. pt seen and examined this morning.    O:  T(C): 36.7 (02-28-18 @ 05:59), Max: 36.8 (02-27-18 @ 16:38)  HR: 92 (02-28-18 @ 05:59) (92 - 104)  BP: 111/70 (02-28-18 @ 05:59) (101/61 - 128/84)  RR: 18 (02-28-18 @ 05:59) (18 - 18)  SpO2: 95% (02-28-18 @ 05:59) (93% - 99%)  Wt(kg): --  02-27 @ 07:01  -  02-28 @ 07:00  --------------------------------------------------------  IN:    Oral Fluid: 1700 mL    sodium chloride 0.9% with potassium chloride 20 mEq/L: 226 mL  Total IN: 1926 mL    OUT:    Bulb: 30 mL    Indwelling Catheter - Urethral: 1500 mL    Voided: 1800 mL  Total OUT: 3330 mL    Total NET: -1404 mL      Gen: NAD  Neck: dressings c/d/i; DENISE serosang; no collections appreciated

## 2018-02-28 NOTE — DISCHARGE NOTE ADULT - HOSPITAL COURSE
36yo F pmhx Chiari Malformation, Su Danlos s/p spinal fusion surgery p/w redness around surgical site and dehiscence of the central portion of her wound. Pt. denies fever chills HA CP SOB n/v/d. Patient denies new weakness, numbness, paresthesias.    On 2/27 pt went to the OR for  Complex closure of wound with plastics, and Revision of hardware, spine, cervical. Pt tolerated well and was treated for post op pain. Pt is to be discharged with DENISE drain and to follow up with plastic surgeon Dr Mtz on Friday. Pt was evaluated by Physical Therapy and outpatient PT was recommended. Pt is medically and neurologically stable for discharge to home.

## 2018-02-28 NOTE — PROGRESS NOTE ADULT - PROBLEM SELECTOR PROBLEM 1
Pain of upper abdomen
Disruption of external surgical wound, initial encounter
Pain of upper abdomen

## 2018-02-28 NOTE — DISCHARGE NOTE ADULT - CARE PROVIDERS DIRECT ADDRESSES
,DirectAddress_Unknown,DirectAddress_Unknown ,DirectAddress_Unknown,[email or web address]

## 2018-02-28 NOTE — PROGRESS NOTE ADULT - PROBLEM SELECTOR PROBLEM 2
Disruption of external surgical wound, initial encounter
Disruption of external surgical wound, initial encounter
Irritable bowel syndrome with constipation

## 2018-02-28 NOTE — PROGRESS NOTE ADULT - PROBLEM SELECTOR PLAN 5
No exacerbation currently. Lungs are clear to auscultation, no wheezing, saturation 100% on RA.  proventil inhaler prn
No exacerbation currently. Lungs are clear to auscultation, no wheezing, saturation 100% on RA.  proventil inhaler prn

## 2018-02-28 NOTE — DISCHARGE NOTE ADULT - CARE PROVIDER_API CALL
Jeramy Adams (DO), Neurosurgery  Spine  [address]  [address]  [place] [address]  Phone: [phone]  Fax: [phone]    Primary Care Physician,   Phone: (   )    -  Fax: (   )    - Primary Care Physician,   Phone: (   )    -  Fax: (   )    -    Jennifer Murray (DO), Neurological Surgery  [address] [place] [id number]  Phone: [phone]  Fax: [phone]

## 2018-03-02 ENCOUNTER — APPOINTMENT (OUTPATIENT)
Dept: PLASTIC SURGERY | Facility: CLINIC | Age: 38
End: 2018-03-02
Payer: MEDICARE

## 2018-03-02 PROCEDURE — 99024 POSTOP FOLLOW-UP VISIT: CPT

## 2018-03-06 ENCOUNTER — TRANSCRIPTION ENCOUNTER (OUTPATIENT)
Age: 38
End: 2018-03-06

## 2018-03-07 ENCOUNTER — APPOINTMENT (OUTPATIENT)
Dept: SPINE | Facility: CLINIC | Age: 38
End: 2018-03-07
Payer: MEDICARE

## 2018-03-07 VITALS
WEIGHT: 172 LBS | BODY MASS INDEX: 31.65 KG/M2 | TEMPERATURE: 98.5 F | HEIGHT: 62 IN | SYSTOLIC BLOOD PRESSURE: 122 MMHG | DIASTOLIC BLOOD PRESSURE: 72 MMHG

## 2018-03-07 DIAGNOSIS — M25.511 PAIN IN RIGHT SHOULDER: ICD-10-CM

## 2018-03-07 PROCEDURE — 99024 POSTOP FOLLOW-UP VISIT: CPT

## 2018-03-09 ENCOUNTER — APPOINTMENT (OUTPATIENT)
Dept: PLASTIC SURGERY | Facility: CLINIC | Age: 38
End: 2018-03-09
Payer: MEDICARE

## 2018-03-09 DIAGNOSIS — M48.00 SPINAL STENOSIS, SITE UNSPECIFIED: ICD-10-CM

## 2018-03-09 PROCEDURE — 99024 POSTOP FOLLOW-UP VISIT: CPT

## 2018-03-13 PROBLEM — M48.00 SPINAL STENOSIS: Status: ACTIVE | Noted: 2017-04-11

## 2018-03-16 ENCOUNTER — APPOINTMENT (OUTPATIENT)
Dept: PLASTIC SURGERY | Facility: CLINIC | Age: 38
End: 2018-03-16
Payer: MEDICARE

## 2018-03-16 PROCEDURE — 99024 POSTOP FOLLOW-UP VISIT: CPT

## 2018-03-21 ENCOUNTER — APPOINTMENT (OUTPATIENT)
Dept: SPINE | Facility: CLINIC | Age: 38
End: 2018-03-21
Payer: MEDICARE

## 2018-03-21 VITALS
HEIGHT: 62 IN | SYSTOLIC BLOOD PRESSURE: 121 MMHG | BODY MASS INDEX: 31.65 KG/M2 | WEIGHT: 172 LBS | DIASTOLIC BLOOD PRESSURE: 71 MMHG

## 2018-03-21 PROCEDURE — 99024 POSTOP FOLLOW-UP VISIT: CPT

## 2018-03-21 RX ORDER — HYDROMORPHONE HYDROCHLORIDE 2 MG/1
2 TABLET ORAL
Qty: 56 | Refills: 0 | Status: COMPLETED | COMMUNITY
Start: 2017-05-19 | End: 2018-03-21

## 2018-03-23 ENCOUNTER — EMERGENCY (EMERGENCY)
Facility: HOSPITAL | Age: 38
LOS: 1 days | Discharge: HOME | End: 2018-03-23
Attending: EMERGENCY MEDICINE | Admitting: EMERGENCY MEDICINE
Payer: MEDICARE

## 2018-03-23 VITALS
DIASTOLIC BLOOD PRESSURE: 75 MMHG | SYSTOLIC BLOOD PRESSURE: 117 MMHG | TEMPERATURE: 98 F | RESPIRATION RATE: 18 BRPM | OXYGEN SATURATION: 100 % | HEART RATE: 91 BPM

## 2018-03-23 VITALS
OXYGEN SATURATION: 99 % | SYSTOLIC BLOOD PRESSURE: 124 MMHG | TEMPERATURE: 99 F | RESPIRATION RATE: 20 BRPM | HEART RATE: 116 BPM | DIASTOLIC BLOOD PRESSURE: 88 MMHG | WEIGHT: 175.05 LBS

## 2018-03-23 DIAGNOSIS — Z98.89 OTHER SPECIFIED POSTPROCEDURAL STATES: Chronic | ICD-10-CM

## 2018-03-23 DIAGNOSIS — Z98.890 OTHER SPECIFIED POSTPROCEDURAL STATES: Chronic | ICD-10-CM

## 2018-03-23 LAB
ALBUMIN SERPL ELPH-MCNC: 3.8 G/DL — SIGNIFICANT CHANGE UP (ref 3.3–5)
ALP SERPL-CCNC: 92 U/L — SIGNIFICANT CHANGE UP (ref 40–120)
ALT FLD-CCNC: 22 U/L RC — SIGNIFICANT CHANGE UP (ref 10–45)
ANION GAP SERPL CALC-SCNC: 10 MMOL/L — SIGNIFICANT CHANGE UP (ref 5–17)
APTT BLD: 30.9 SEC — SIGNIFICANT CHANGE UP (ref 27.5–37.4)
AST SERPL-CCNC: 18 U/L — SIGNIFICANT CHANGE UP (ref 10–40)
BASOPHILS # BLD AUTO: 0 K/UL — SIGNIFICANT CHANGE UP (ref 0–0.2)
BASOPHILS NFR BLD AUTO: 0.6 % — SIGNIFICANT CHANGE UP (ref 0–2)
BILIRUB SERPL-MCNC: 0.1 MG/DL — LOW (ref 0.2–1.2)
BLD GP AB SCN SERPL QL: NEGATIVE — SIGNIFICANT CHANGE UP
BUN SERPL-MCNC: 8 MG/DL — SIGNIFICANT CHANGE UP (ref 7–23)
CALCIUM SERPL-MCNC: 9.6 MG/DL — SIGNIFICANT CHANGE UP (ref 8.4–10.5)
CHLORIDE SERPL-SCNC: 102 MMOL/L — SIGNIFICANT CHANGE UP (ref 96–108)
CO2 SERPL-SCNC: 26 MMOL/L — SIGNIFICANT CHANGE UP (ref 22–31)
CREAT SERPL-MCNC: 0.74 MG/DL — SIGNIFICANT CHANGE UP (ref 0.5–1.3)
EOSINOPHIL # BLD AUTO: 0.2 K/UL — SIGNIFICANT CHANGE UP (ref 0–0.5)
EOSINOPHIL NFR BLD AUTO: 2.7 % — SIGNIFICANT CHANGE UP (ref 0–6)
GLUCOSE SERPL-MCNC: 89 MG/DL — SIGNIFICANT CHANGE UP (ref 70–99)
HCT VFR BLD CALC: 40.3 % — SIGNIFICANT CHANGE UP (ref 34.5–45)
HGB BLD-MCNC: 13.5 G/DL — SIGNIFICANT CHANGE UP (ref 11.5–15.5)
INR BLD: 1.02 RATIO — SIGNIFICANT CHANGE UP (ref 0.88–1.16)
LYMPHOCYTES # BLD AUTO: 2 K/UL — SIGNIFICANT CHANGE UP (ref 1–3.3)
LYMPHOCYTES # BLD AUTO: 27.2 % — SIGNIFICANT CHANGE UP (ref 13–44)
MCHC RBC-ENTMCNC: 29.9 PG — SIGNIFICANT CHANGE UP (ref 27–34)
MCHC RBC-ENTMCNC: 33.6 GM/DL — SIGNIFICANT CHANGE UP (ref 32–36)
MCV RBC AUTO: 89 FL — SIGNIFICANT CHANGE UP (ref 80–100)
MONOCYTES # BLD AUTO: 0.6 K/UL — SIGNIFICANT CHANGE UP (ref 0–0.9)
MONOCYTES NFR BLD AUTO: 7.7 % — SIGNIFICANT CHANGE UP (ref 2–14)
NEUTROPHILS # BLD AUTO: 4.7 K/UL — SIGNIFICANT CHANGE UP (ref 1.8–7.4)
NEUTROPHILS NFR BLD AUTO: 61.8 % — SIGNIFICANT CHANGE UP (ref 43–77)
PLATELET # BLD AUTO: 333 K/UL — SIGNIFICANT CHANGE UP (ref 150–400)
POTASSIUM SERPL-MCNC: 4.1 MMOL/L — SIGNIFICANT CHANGE UP (ref 3.5–5.3)
POTASSIUM SERPL-SCNC: 4.1 MMOL/L — SIGNIFICANT CHANGE UP (ref 3.5–5.3)
PROT SERPL-MCNC: 7 G/DL — SIGNIFICANT CHANGE UP (ref 6–8.3)
PROTHROM AB SERPL-ACNC: 11 SEC — SIGNIFICANT CHANGE UP (ref 9.8–12.7)
RBC # BLD: 4.52 M/UL — SIGNIFICANT CHANGE UP (ref 3.8–5.2)
RBC # FLD: 12.6 % — SIGNIFICANT CHANGE UP (ref 10.3–14.5)
RH IG SCN BLD-IMP: POSITIVE — SIGNIFICANT CHANGE UP
SODIUM SERPL-SCNC: 138 MMOL/L — SIGNIFICANT CHANGE UP (ref 135–145)
WBC # BLD: 7.5 K/UL — SIGNIFICANT CHANGE UP (ref 3.8–10.5)
WBC # FLD AUTO: 7.5 K/UL — SIGNIFICANT CHANGE UP (ref 3.8–10.5)

## 2018-03-23 PROCEDURE — 86900 BLOOD TYPING SEROLOGIC ABO: CPT

## 2018-03-23 PROCEDURE — 80053 COMPREHEN METABOLIC PANEL: CPT

## 2018-03-23 PROCEDURE — 86850 RBC ANTIBODY SCREEN: CPT

## 2018-03-23 PROCEDURE — 85610 PROTHROMBIN TIME: CPT

## 2018-03-23 PROCEDURE — 85027 COMPLETE CBC AUTOMATED: CPT

## 2018-03-23 PROCEDURE — 86901 BLOOD TYPING SEROLOGIC RH(D): CPT

## 2018-03-23 PROCEDURE — 70450 CT HEAD/BRAIN W/O DYE: CPT | Mod: 26

## 2018-03-23 PROCEDURE — 70450 CT HEAD/BRAIN W/O DYE: CPT

## 2018-03-23 PROCEDURE — 85730 THROMBOPLASTIN TIME PARTIAL: CPT

## 2018-03-23 PROCEDURE — 99284 EMERGENCY DEPT VISIT MOD MDM: CPT | Mod: 25

## 2018-03-23 PROCEDURE — 96375 TX/PRO/DX INJ NEW DRUG ADDON: CPT

## 2018-03-23 PROCEDURE — 99284 EMERGENCY DEPT VISIT MOD MDM: CPT | Mod: GC

## 2018-03-23 PROCEDURE — 96374 THER/PROPH/DIAG INJ IV PUSH: CPT

## 2018-03-23 RX ORDER — ACETAMINOPHEN 500 MG
1000 TABLET ORAL ONCE
Qty: 0 | Refills: 0 | Status: COMPLETED | OUTPATIENT
Start: 2018-03-23 | End: 2018-03-23

## 2018-03-23 RX ORDER — SODIUM CHLORIDE 9 MG/ML
1000 INJECTION INTRAMUSCULAR; INTRAVENOUS; SUBCUTANEOUS ONCE
Qty: 0 | Refills: 0 | Status: COMPLETED | OUTPATIENT
Start: 2018-03-23 | End: 2018-03-23

## 2018-03-23 RX ORDER — METOCLOPRAMIDE HCL 10 MG
10 TABLET ORAL ONCE
Qty: 0 | Refills: 0 | Status: COMPLETED | OUTPATIENT
Start: 2018-03-23 | End: 2018-03-23

## 2018-03-23 RX ADMIN — Medication 10 MILLIGRAM(S): at 12:04

## 2018-03-23 RX ADMIN — Medication 1000 MILLIGRAM(S): at 12:04

## 2018-03-23 RX ADMIN — SODIUM CHLORIDE 1000 MILLILITER(S): 9 INJECTION INTRAMUSCULAR; INTRAVENOUS; SUBCUTANEOUS at 12:05

## 2018-03-23 RX ADMIN — Medication 400 MILLIGRAM(S): at 12:04

## 2018-03-23 NOTE — ED PROVIDER NOTE - OBJECTIVE STATEMENT
38F PMH Su-Danlos, Chiari malformation, asthma p/w headache and R eye blurred vision, R ear clear drainage x 2-3 days. Pt called her neurosurgeon (Dr. Friend) and sent to ED for evaluation. +NV. No fever/chills. Had surgical intervention 2/26 for infected hardware, staples removed 7 days ago. 38F PMH Su-Danlos, Chiari malformation, asthma p/w headache and R eye blurred vision, R ear clear drainage x 2-3 days. Pt called her neurosurgeon (Dr. Friend) and sent to ED for evaluation. +NV. No fever/chills. Had surgical intervention  for infected hardware, staples removed 7 days ago.    Attendinyo female presents with headache, right eye vision changes and clear drainage from the right ear for 2-3 days.  Pt is s/p neurosurgical procedure for chiari malformation.

## 2018-03-23 NOTE — ED PROVIDER NOTE - PROGRESS NOTE DETAILS
Pain improved after meds. D/w neurosurgery, including results of CT head and feel headache not related to surgery aside from normal post-op muscular pain. Will f/u with neurosurg outpatient. Stable for dc.

## 2018-03-23 NOTE — CONSULT NOTE ADULT - ATTENDING COMMENTS
I reviewed the resident's note and agree. The patient is a 38-year-old female with a history of Chiari 1 malformation and Su-Danlos syndrome s/p suboccipital craniectomy, C1 laminectomy, and occipital condyle-C2 posterior craniocervical instrumentation and fusion on 2/5/18 and s/p revision of occipital condyle-C2 posterior craniocervical instrumentation and fusion with Plastics closure on 2/26/18.  The patient is currently neurologically stable. No acute neurosurgical intervention is indicated at this time. Recommend pain control, muscle relaxants, and ENT follow-up regarding possible drainage from ear.

## 2018-03-23 NOTE — CONSULT NOTE ADULT - ASSESSMENT
38F h/o chiari malformation and craniovertebral instability s/p Occ to C2 fusion here with headache, neck pain, and intermittent leaking from ear. Occ-C2 fusion procedure extradural no breach into sinus. Unlikely that leak is related to recent fusion. Patients headache likely related to post op muscle spasm  - no acute neurosurgical intervention  - Recommend pain control, and antispasmodics ie flexeril or valium  - ENT eval for leaking from ear

## 2018-03-23 NOTE — ED ADULT NURSE NOTE - OBJECTIVE STATEMENT
37 yo female A&Ox4 presents to ED c/o severe head and neck pain w/ clear fluid drainage from R ear. Pt states she had surgery on neck and brain on 2/5/18 and 2/26/18 for chiari malformation. Surgery on 2/26/18 was due to infection from first surgery and to fix hardware per pt. She also states she has experienced visual changes w/ blurriness. She reports symptoms have been occurring for past few days. She states she had a fever off and on over past few days, currently no fever. She reports taking excedrine, dilaudid 4 mg, and valium 5 mg without relief of pain. Last took dilaudid and valium this morning 0600, no improvement. She denies any recent falls or trauma. Pupils are equal and reactive, 4mm. She denies numbness or tingling in extremities, coordination in intact. She complains of pain 8/10 in head and neck, with increased pain/sensitivity to touch. She reports nausea and vomiting over past few days. IV access and labs obtained.

## 2018-03-23 NOTE — CONSULT NOTE ADULT - SUBJECTIVE AND OBJECTIVE BOX
p ([id number])     HPI:38F PMH Su-Danlos, Chiari malformation, asthma p/w headache and R eye blurred vision, R ear clear drainage x 2-3 days. Pt called her neurosurgeon (Dr. Murray) and sent to ED for evaluation. +NV. No fever/chills. Had surgical intervention  for wound washout and replacement of screw, staples removed 7 days ago.    Patient endorses headache and muscle spasm. States that ear leaking happened 4 times over past 4 days. States it was clear fluid. Patient denies vomti, blurry vision, double vision, weakness, numbness.    PAST MEDICAL HISTORY   Tethered cord syndrome  EDS (Su-Danlos syndrome)  Chiari I malformation  Irritable bowel syndrome without diarrhea  Cervical disc disease  Low back pain  Asthma    PAST SURGICAL HISTORY   S/P myelogram  History of tonsillectomy  History of cholecystectomy  H/O:   S/P lumbar laminectomy    Bee Stings (Anaphylaxis)  Keflex (Anaphylaxis)  latex (Rash)  penicillins (Anaphylaxis)      MEDICATIONS:  Antibiotics:    Neuro:    Anticoagulation:    Other:      SOCIAL HISTORY:   Occupation:   Marital Status:     FAMILY HISTORY:  Family history of coronary artery disease (Sibling)  Family history of hepatitis C  Family history of drug abuse      REVIEW OF SYSTEMS:  negative but for hpi  General:  Eyes:  ENT:  Cardiac:  Respiratory:  GI:  Musculoskeletal:   Skin:  Neurologic:   Psychiatric:     PHYSICAL EXAMINATION:   T(C): 36.8 (18 @ 11:17), Max: 37.2 (18 @ 10:52)  HR: 113 (18 @ 11:17) (113 - 116)  BP: 106/76 (18 @ 11:17) (106/76 - 124/88)  RR: 20 (18 @ 11:17) (20 - 20)  SpO2: 100% (18 @ 11:17) (99% - 100%)  Wt(kg): --  Weight (kg): 79.4 ( @ 10:52)    General Examination:     Neurologic Examination:           AOx3, FC, PERRL, EOMI, V1-3 intact, no facial, palate vitaliy symmetric, tongue midline, shrug 5/5  R DF 4/5 (baseline) otherwise 5/5 throughout, no drift  SILT  No clonus or babinski    LABS:                        13.5   7.5   )-----------( 333      ( 23 Mar 2018 11:25 )             40.3         138  |  102  |  8   ----------------------------<  89  4.1   |  26  |  0.74    Ca    9.6      23 Mar 2018 11:25    TPro  7.0  /  Alb  3.8  /  TBili  0.1<L>  /  DBili  x   /  AST  18  /  ALT  22  /  AlkPhos  92  03-    PT/INR - ( 23 Mar 2018 11:25 )   PT: 11.0 sec;   INR: 1.02 ratio         PTT - ( 23 Mar 2018 11:25 )  PTT:30.9 sec      RADIOLOGY & ADDITIONAL STUDIES:

## 2018-03-30 ENCOUNTER — APPOINTMENT (OUTPATIENT)
Dept: PLASTIC SURGERY | Facility: CLINIC | Age: 38
End: 2018-03-30
Payer: MEDICARE

## 2018-03-30 DIAGNOSIS — Z98.890 OTHER SPECIFIED POSTPROCEDURAL STATES: ICD-10-CM

## 2018-03-30 PROCEDURE — 99024 POSTOP FOLLOW-UP VISIT: CPT

## 2018-04-04 ENCOUNTER — OUTPATIENT (OUTPATIENT)
Dept: OUTPATIENT SERVICES | Facility: HOSPITAL | Age: 38
LOS: 1 days | End: 2018-04-04
Payer: MEDICARE

## 2018-04-04 ENCOUNTER — APPOINTMENT (OUTPATIENT)
Dept: CT IMAGING | Facility: CLINIC | Age: 38
End: 2018-04-04
Payer: MEDICARE

## 2018-04-04 ENCOUNTER — APPOINTMENT (OUTPATIENT)
Dept: SPINE | Facility: CLINIC | Age: 38
End: 2018-04-04
Payer: MEDICARE

## 2018-04-04 VITALS
SYSTOLIC BLOOD PRESSURE: 118 MMHG | WEIGHT: 172 LBS | BODY MASS INDEX: 31.65 KG/M2 | DIASTOLIC BLOOD PRESSURE: 70 MMHG | HEIGHT: 62 IN

## 2018-04-04 DIAGNOSIS — M53.2X1 SPINAL INSTABILITIES, OCCIPITO-ATLANTO-AXIAL REGION: ICD-10-CM

## 2018-04-04 DIAGNOSIS — Z98.89 OTHER SPECIFIED POSTPROCEDURAL STATES: Chronic | ICD-10-CM

## 2018-04-04 DIAGNOSIS — Z00.8 ENCOUNTER FOR OTHER GENERAL EXAMINATION: ICD-10-CM

## 2018-04-04 DIAGNOSIS — Z98.890 OTHER SPECIFIED POSTPROCEDURAL STATES: Chronic | ICD-10-CM

## 2018-04-04 PROCEDURE — 99024 POSTOP FOLLOW-UP VISIT: CPT

## 2018-04-04 PROCEDURE — 72125 CT NECK SPINE W/O DYE: CPT | Mod: 26

## 2018-04-04 PROCEDURE — 72125 CT NECK SPINE W/O DYE: CPT

## 2018-04-04 PROCEDURE — 76376 3D RENDER W/INTRP POSTPROCES: CPT

## 2018-04-05 ENCOUNTER — APPOINTMENT (OUTPATIENT)
Dept: NEUROSURGERY | Facility: CLINIC | Age: 38
End: 2018-04-05
Payer: MEDICARE

## 2018-04-05 VITALS
BODY MASS INDEX: 32.2 KG/M2 | DIASTOLIC BLOOD PRESSURE: 90 MMHG | WEIGHT: 175 LBS | HEIGHT: 62 IN | HEART RATE: 104 BPM | SYSTOLIC BLOOD PRESSURE: 130 MMHG

## 2018-04-05 PROCEDURE — 99213 OFFICE O/P EST LOW 20 MIN: CPT | Mod: 24

## 2018-04-05 RX ORDER — DIAZEPAM 5 MG/1
5 TABLET ORAL EVERY 8 HOURS
Qty: 21 | Refills: 0 | Status: DISCONTINUED | COMMUNITY
Start: 2018-02-21 | End: 2018-04-05

## 2018-04-05 RX ORDER — HYDROMORPHONE HYDROCHLORIDE 2 MG/1
2 TABLET ORAL
Qty: 120 | Refills: 0 | Status: DISCONTINUED | COMMUNITY
Start: 2017-05-31 | End: 2018-04-05

## 2018-04-11 ENCOUNTER — APPOINTMENT (OUTPATIENT)
Dept: SPINE | Facility: CLINIC | Age: 38
End: 2018-04-11
Payer: MEDICARE

## 2018-04-11 ENCOUNTER — OUTPATIENT (OUTPATIENT)
Dept: OUTPATIENT SERVICES | Facility: HOSPITAL | Age: 38
LOS: 1 days | End: 2018-04-11
Payer: MEDICARE

## 2018-04-11 VITALS
SYSTOLIC BLOOD PRESSURE: 134 MMHG | BODY MASS INDEX: 32.2 KG/M2 | WEIGHT: 175 LBS | DIASTOLIC BLOOD PRESSURE: 88 MMHG | HEIGHT: 62 IN

## 2018-04-11 DIAGNOSIS — Z98.89 OTHER SPECIFIED POSTPROCEDURAL STATES: Chronic | ICD-10-CM

## 2018-04-11 DIAGNOSIS — Z51.89 ENCOUNTER FOR OTHER SPECIFIED AFTERCARE: ICD-10-CM

## 2018-04-11 DIAGNOSIS — Z98.890 OTHER SPECIFIED POSTPROCEDURAL STATES: Chronic | ICD-10-CM

## 2018-04-11 DIAGNOSIS — Z98.890 OTHER SPECIFIED POSTPROCEDURAL STATES: ICD-10-CM

## 2018-04-11 PROCEDURE — 97163 PT EVAL HIGH COMPLEX 45 MIN: CPT

## 2018-04-11 PROCEDURE — G8982: CPT | Mod: CL

## 2018-04-11 PROCEDURE — 97010 HOT OR COLD PACKS THERAPY: CPT

## 2018-04-11 PROCEDURE — G8981: CPT | Mod: CM

## 2018-04-11 PROCEDURE — 99024 POSTOP FOLLOW-UP VISIT: CPT

## 2018-04-17 ENCOUNTER — EMERGENCY (EMERGENCY)
Facility: HOSPITAL | Age: 38
LOS: 1 days | Discharge: HOME | End: 2018-04-17
Attending: EMERGENCY MEDICINE
Payer: MEDICARE

## 2018-04-17 VITALS
DIASTOLIC BLOOD PRESSURE: 87 MMHG | RESPIRATION RATE: 20 BRPM | OXYGEN SATURATION: 97 % | TEMPERATURE: 98 F | HEART RATE: 102 BPM | SYSTOLIC BLOOD PRESSURE: 139 MMHG

## 2018-04-17 VITALS — WEIGHT: 175.05 LBS | HEIGHT: 63 IN

## 2018-04-17 DIAGNOSIS — Z98.89 OTHER SPECIFIED POSTPROCEDURAL STATES: Chronic | ICD-10-CM

## 2018-04-17 DIAGNOSIS — Z98.890 OTHER SPECIFIED POSTPROCEDURAL STATES: Chronic | ICD-10-CM

## 2018-04-17 LAB
ALBUMIN SERPL ELPH-MCNC: 4.1 G/DL — SIGNIFICANT CHANGE UP (ref 3.3–5.2)
ALP SERPL-CCNC: 89 U/L — SIGNIFICANT CHANGE UP (ref 40–120)
ALT FLD-CCNC: 11 U/L — SIGNIFICANT CHANGE UP
ANION GAP SERPL CALC-SCNC: 10 MMOL/L — SIGNIFICANT CHANGE UP (ref 5–17)
AST SERPL-CCNC: 12 U/L — SIGNIFICANT CHANGE UP
BASOPHILS # BLD AUTO: 0 K/UL — SIGNIFICANT CHANGE UP (ref 0–0.2)
BASOPHILS NFR BLD AUTO: 0.2 % — SIGNIFICANT CHANGE UP (ref 0–2)
BILIRUB SERPL-MCNC: <0.2 MG/DL — LOW (ref 0.4–2)
BUN SERPL-MCNC: 11 MG/DL — SIGNIFICANT CHANGE UP (ref 8–20)
CALCIUM SERPL-MCNC: 9.1 MG/DL — SIGNIFICANT CHANGE UP (ref 8.6–10.2)
CHLORIDE SERPL-SCNC: 106 MMOL/L — SIGNIFICANT CHANGE UP (ref 98–107)
CO2 SERPL-SCNC: 24 MMOL/L — SIGNIFICANT CHANGE UP (ref 22–29)
CREAT SERPL-MCNC: 0.64 MG/DL — SIGNIFICANT CHANGE UP (ref 0.5–1.3)
EOSINOPHIL # BLD AUTO: 0.1 K/UL — SIGNIFICANT CHANGE UP (ref 0–0.5)
EOSINOPHIL NFR BLD AUTO: 1.4 % — SIGNIFICANT CHANGE UP (ref 0–6)
GLUCOSE SERPL-MCNC: 100 MG/DL — SIGNIFICANT CHANGE UP (ref 70–115)
HCT VFR BLD CALC: 44.8 % — SIGNIFICANT CHANGE UP (ref 37–47)
HGB BLD-MCNC: 14.3 G/DL — SIGNIFICANT CHANGE UP (ref 12–16)
LYMPHOCYTES # BLD AUTO: 2.7 K/UL — SIGNIFICANT CHANGE UP (ref 1–4.8)
LYMPHOCYTES # BLD AUTO: 31.6 % — SIGNIFICANT CHANGE UP (ref 20–55)
MCHC RBC-ENTMCNC: 28.4 PG — SIGNIFICANT CHANGE UP (ref 27–31)
MCHC RBC-ENTMCNC: 31.9 G/DL — LOW (ref 32–36)
MCV RBC AUTO: 88.9 FL — SIGNIFICANT CHANGE UP (ref 81–99)
MONOCYTES # BLD AUTO: 0.5 K/UL — SIGNIFICANT CHANGE UP (ref 0–0.8)
MONOCYTES NFR BLD AUTO: 5.7 % — SIGNIFICANT CHANGE UP (ref 3–10)
NEUTROPHILS # BLD AUTO: 5.1 K/UL — SIGNIFICANT CHANGE UP (ref 1.8–8)
NEUTROPHILS NFR BLD AUTO: 60.7 % — SIGNIFICANT CHANGE UP (ref 37–73)
PLATELET # BLD AUTO: 292 K/UL — SIGNIFICANT CHANGE UP (ref 150–400)
POTASSIUM SERPL-MCNC: 3.9 MMOL/L — SIGNIFICANT CHANGE UP (ref 3.5–5.3)
POTASSIUM SERPL-SCNC: 3.9 MMOL/L — SIGNIFICANT CHANGE UP (ref 3.5–5.3)
PROT SERPL-MCNC: 7 G/DL — SIGNIFICANT CHANGE UP (ref 6.6–8.7)
RBC # BLD: 5.04 M/UL — SIGNIFICANT CHANGE UP (ref 4.4–5.2)
RBC # FLD: 13.8 % — SIGNIFICANT CHANGE UP (ref 11–15.6)
SODIUM SERPL-SCNC: 140 MMOL/L — SIGNIFICANT CHANGE UP (ref 135–145)
TROPONIN T SERPL-MCNC: <0.01 NG/ML — SIGNIFICANT CHANGE UP (ref 0–0.06)
WBC # BLD: 8.4 K/UL — SIGNIFICANT CHANGE UP (ref 4.8–10.8)
WBC # FLD AUTO: 8.4 K/UL — SIGNIFICANT CHANGE UP (ref 4.8–10.8)

## 2018-04-17 PROCEDURE — 72125 CT NECK SPINE W/O DYE: CPT | Mod: 26

## 2018-04-17 PROCEDURE — 36415 COLL VENOUS BLD VENIPUNCTURE: CPT

## 2018-04-17 PROCEDURE — 70450 CT HEAD/BRAIN W/O DYE: CPT

## 2018-04-17 PROCEDURE — 72125 CT NECK SPINE W/O DYE: CPT

## 2018-04-17 PROCEDURE — 73030 X-RAY EXAM OF SHOULDER: CPT | Mod: 26,RT

## 2018-04-17 PROCEDURE — 70450 CT HEAD/BRAIN W/O DYE: CPT | Mod: 26

## 2018-04-17 PROCEDURE — 99284 EMERGENCY DEPT VISIT MOD MDM: CPT | Mod: 25

## 2018-04-17 PROCEDURE — 93005 ELECTROCARDIOGRAM TRACING: CPT

## 2018-04-17 PROCEDURE — 93010 ELECTROCARDIOGRAM REPORT: CPT

## 2018-04-17 PROCEDURE — 73030 X-RAY EXAM OF SHOULDER: CPT

## 2018-04-17 PROCEDURE — 99284 EMERGENCY DEPT VISIT MOD MDM: CPT

## 2018-04-17 PROCEDURE — 80053 COMPREHEN METABOLIC PANEL: CPT

## 2018-04-17 PROCEDURE — 84484 ASSAY OF TROPONIN QUANT: CPT

## 2018-04-17 PROCEDURE — 85027 COMPLETE CBC AUTOMATED: CPT

## 2018-04-17 NOTE — ED ADULT NURSE NOTE - OBJECTIVE STATEMENT
38 yof with extensive cervical surgery hx in c- collar presents to ed with syncope. airway patent lung sounds clear equal bilaterally abd soft nontender nondistended moves allextremiteis. ambulates with steady gait.

## 2018-04-17 NOTE — ED ADULT TRIAGE NOTE - CHIEF COMPLAINT QUOTE
patient arrived via EMS. awake ,alert, and oriented times 3, breathing unlabored.  Patient states got dizzy and " passed out" prior to ED arrival.  patient hit head.  Positive LOC.  No blood thinners.  recent brain and neck sugery on 2/5 and 2/26.  Dr. Chu called to bedside.  Priority CT called.  patient complaining of pain to right side of head, neck and right shoulder.  c-collar in place from recent surgery.

## 2018-04-17 NOTE — ED ADULT NURSE REASSESSMENT NOTE - NS ED NURSE REASSESS COMMENT FT1
Assuming care from previous RN, pt AOx4, denies SOB, resp even and unlabored, skin warm and dry, color good, denies pain/n/v, cervical collar from home in place for support, pt aware of plan of care, family @ bedside, will continue to monitor.

## 2018-04-17 NOTE — ED PROVIDER NOTE - NEUROLOGICAL, MLM
Alert and oriented, no focal deficits, no motor or sensory deficits. Sensation intact. Clear Speech. motor function 5/5 all extremities.

## 2018-04-17 NOTE — ED PROVIDER NOTE - PMH
Asthma  no recent exacerbations  Cervical disc disease  cervical fusion  Chiari I malformation  decompression Feb 2018  EDS (Su-Danlos syndrome)    Irritable bowel syndrome without diarrhea  with constipation  Low back pain  chronic x 2 1/2 years  Tethered cord syndrome

## 2018-04-17 NOTE — ED PROVIDER NOTE - MEDICAL DECISION MAKING DETAILS
Pt states she feels better. Neuro intact, no weakness or sensory deficits. Pain well controlle.d Ablwe to ambulate with no lightheadedness or near syncope. labs and ekg normal. Pt reports she has chronic issues of positional near syncopal events. No carotid bruits and has good reliable pmd follow up. Reviewed images of her CT with her Neurosurgeon Terri, whoagrees with dc and will follow up with her tomorrow in office

## 2018-04-17 NOTE — ED PROVIDER NOTE - OBJECTIVE STATEMENT
37 y/o F w/ PMHx of decompression of Chiari malformation (Feb. 2018) and cervical fusion with chronic neck pain p/w with fall today. Pt states she frequently gets light headed if she looks down for prolonged periods of time. She states she was looking down started to feel lightheaded and dizziness when she suddenly feel down. C/o R sided head and neck pain. Denies N/V, LOC, head trauma, or any other complaints at this time.

## 2018-04-25 ENCOUNTER — APPOINTMENT (OUTPATIENT)
Dept: SPINE | Facility: CLINIC | Age: 38
End: 2018-04-25
Payer: MEDICARE

## 2018-04-25 VITALS
BODY MASS INDEX: 32.2 KG/M2 | DIASTOLIC BLOOD PRESSURE: 80 MMHG | SYSTOLIC BLOOD PRESSURE: 130 MMHG | HEIGHT: 62 IN | WEIGHT: 175 LBS

## 2018-04-25 PROCEDURE — 99024 POSTOP FOLLOW-UP VISIT: CPT

## 2018-04-26 ENCOUNTER — OUTPATIENT (OUTPATIENT)
Dept: OUTPATIENT SERVICES | Facility: HOSPITAL | Age: 38
LOS: 1 days | End: 2018-04-26
Payer: MEDICARE

## 2018-04-26 ENCOUNTER — APPOINTMENT (OUTPATIENT)
Dept: NEUROSURGERY | Facility: CLINIC | Age: 38
End: 2018-04-26

## 2018-04-26 ENCOUNTER — APPOINTMENT (OUTPATIENT)
Dept: MRI IMAGING | Facility: CLINIC | Age: 38
End: 2018-04-26
Payer: MEDICARE

## 2018-04-26 DIAGNOSIS — Z98.890 OTHER SPECIFIED POSTPROCEDURAL STATES: Chronic | ICD-10-CM

## 2018-04-26 DIAGNOSIS — Z98.89 OTHER SPECIFIED POSTPROCEDURAL STATES: Chronic | ICD-10-CM

## 2018-04-26 DIAGNOSIS — Z98.890 OTHER SPECIFIED POSTPROCEDURAL STATES: ICD-10-CM

## 2018-04-26 DIAGNOSIS — M53.2X1 SPINAL INSTABILITIES, OCCIPITO-ATLANTO-AXIAL REGION: ICD-10-CM

## 2018-04-26 DIAGNOSIS — Q79.6 EHLERS-DANLOS SYNDROMES: ICD-10-CM

## 2018-04-26 PROCEDURE — 72141 MRI NECK SPINE W/O DYE: CPT

## 2018-04-26 PROCEDURE — 72141 MRI NECK SPINE W/O DYE: CPT | Mod: 26

## 2018-05-09 ENCOUNTER — APPOINTMENT (OUTPATIENT)
Dept: SPINE | Facility: CLINIC | Age: 38
End: 2018-05-09
Payer: MEDICARE

## 2018-05-09 VITALS
DIASTOLIC BLOOD PRESSURE: 78 MMHG | BODY MASS INDEX: 32.2 KG/M2 | WEIGHT: 175 LBS | SYSTOLIC BLOOD PRESSURE: 126 MMHG | HEIGHT: 62 IN

## 2018-05-09 PROCEDURE — 99213 OFFICE O/P EST LOW 20 MIN: CPT

## 2018-05-16 ENCOUNTER — APPOINTMENT (OUTPATIENT)
Dept: MRI IMAGING | Facility: CLINIC | Age: 38
End: 2018-05-16
Payer: MEDICARE

## 2018-05-16 ENCOUNTER — OUTPATIENT (OUTPATIENT)
Dept: OUTPATIENT SERVICES | Facility: HOSPITAL | Age: 38
LOS: 1 days | End: 2018-05-16

## 2018-05-16 DIAGNOSIS — Z98.89 OTHER SPECIFIED POSTPROCEDURAL STATES: Chronic | ICD-10-CM

## 2018-05-16 DIAGNOSIS — Q07.00 ARNOLD-CHIARI SYNDROME WITHOUT SPINA BIFIDA OR HYDROCEPHALUS: ICD-10-CM

## 2018-05-16 DIAGNOSIS — Z98.890 OTHER SPECIFIED POSTPROCEDURAL STATES: Chronic | ICD-10-CM

## 2018-05-16 PROCEDURE — 70551 MRI BRAIN STEM W/O DYE: CPT | Mod: 26

## 2018-05-16 NOTE — ED ADULT TRIAGE NOTE - BMI (KG/M2)
"Problem: Patient Care Overview  Goal: Plan of Care/Patient Progress Review  Discharge Planner PT   Patient plan for discharge: pt believes she may benefit from TCU, but open to team recommendations. Notes she is concerned about \"overwhelming/overburdening\" her spouse. Notes he has no physical limitations to caring for her but that he is unlikely to be able to provide initial 24/7 assist due to farm chores/other work demands. Reports her daughter is coming into town for a few days at end of week and can stay with her should she discharge home.     Current status: PT 6A: Per RN pt appropriate for PT. Pt demonstrates safe logroll technique and supine>sit with SBA-IND, sit<>stand SBA-IND. Pt most stable with walker at current time as opposed to cane. Pt required SBA to ambulate with walker 2 x 160 ft. No balance concerns noted with walker use. Pt CGA to slight KEYON with cane use x 60 ft. Pt navigated 3 stairs with SBA, L rail and safe technique. Due to feeling she had overdone activity (including stairs) last visit, did not attempt further stairs. Plan to resume working on full # next visit. Educated pt further as to precautions and ADL modifications. Pt feels she would yet benefit from OT visit-discussed with OT.  Barriers to return to prior living situation: pain, reduced activity tolerance; multiple stairs  Recommendations for discharge: based on progress this visit recommend TCU vs home with assist, OP PT. If pt continues to have ongoing progress anticipate pt will be able to reach safe point to discharge to home with assist, OP PT.   Rationale for recommendations: progress, below baseline functional stats       Entered by: Nancy No 05/16/2018 9:52 AM           " 31

## 2018-05-20 ENCOUNTER — TRANSCRIPTION ENCOUNTER (OUTPATIENT)
Age: 38
End: 2018-05-20

## 2018-05-23 ENCOUNTER — EMERGENCY (EMERGENCY)
Facility: HOSPITAL | Age: 38
LOS: 1 days | Discharge: HOME | End: 2018-05-23
Attending: EMERGENCY MEDICINE
Payer: MEDICARE

## 2018-05-23 ENCOUNTER — APPOINTMENT (OUTPATIENT)
Dept: SPINE | Facility: CLINIC | Age: 38
End: 2018-05-23
Payer: MEDICARE

## 2018-05-23 VITALS
OXYGEN SATURATION: 98 % | DIASTOLIC BLOOD PRESSURE: 84 MMHG | RESPIRATION RATE: 20 BRPM | SYSTOLIC BLOOD PRESSURE: 127 MMHG | HEART RATE: 116 BPM | TEMPERATURE: 98 F

## 2018-05-23 VITALS
OXYGEN SATURATION: 99 % | RESPIRATION RATE: 16 BRPM | TEMPERATURE: 98 F | DIASTOLIC BLOOD PRESSURE: 77 MMHG | HEART RATE: 127 BPM | SYSTOLIC BLOOD PRESSURE: 115 MMHG

## 2018-05-23 VITALS — DIASTOLIC BLOOD PRESSURE: 81 MMHG | HEART RATE: 112 BPM | TEMPERATURE: 97.4 F | SYSTOLIC BLOOD PRESSURE: 124 MMHG

## 2018-05-23 DIAGNOSIS — Z98.89 OTHER SPECIFIED POSTPROCEDURAL STATES: Chronic | ICD-10-CM

## 2018-05-23 DIAGNOSIS — Z98.890 OTHER SPECIFIED POSTPROCEDURAL STATES: Chronic | ICD-10-CM

## 2018-05-23 LAB
ALBUMIN SERPL ELPH-MCNC: 4.2 G/DL — SIGNIFICANT CHANGE UP (ref 3.3–5.2)
ALP SERPL-CCNC: 89 U/L — SIGNIFICANT CHANGE UP (ref 40–120)
ALT FLD-CCNC: 11 U/L — SIGNIFICANT CHANGE UP
ANION GAP SERPL CALC-SCNC: 15 MMOL/L — SIGNIFICANT CHANGE UP (ref 5–17)
APPEARANCE UR: CLEAR — SIGNIFICANT CHANGE UP
AST SERPL-CCNC: 11 U/L — SIGNIFICANT CHANGE UP
BACTERIA # UR AUTO: ABNORMAL
BASOPHILS # BLD AUTO: 0 K/UL — SIGNIFICANT CHANGE UP (ref 0–0.2)
BASOPHILS NFR BLD AUTO: 0.1 % — SIGNIFICANT CHANGE UP (ref 0–2)
BILIRUB SERPL-MCNC: 0.2 MG/DL — LOW (ref 0.4–2)
BILIRUB UR-MCNC: NEGATIVE — SIGNIFICANT CHANGE UP
BUN SERPL-MCNC: 13 MG/DL — SIGNIFICANT CHANGE UP (ref 8–20)
CALCIUM SERPL-MCNC: 9.4 MG/DL — SIGNIFICANT CHANGE UP (ref 8.6–10.2)
CHLORIDE SERPL-SCNC: 104 MMOL/L — SIGNIFICANT CHANGE UP (ref 98–107)
CO2 SERPL-SCNC: 23 MMOL/L — SIGNIFICANT CHANGE UP (ref 22–29)
COLOR SPEC: YELLOW — SIGNIFICANT CHANGE UP
CREAT SERPL-MCNC: 0.79 MG/DL — SIGNIFICANT CHANGE UP (ref 0.5–1.3)
DIFF PNL FLD: ABNORMAL
EOSINOPHIL # BLD AUTO: 0.1 K/UL — SIGNIFICANT CHANGE UP (ref 0–0.5)
EOSINOPHIL NFR BLD AUTO: 0.6 % — SIGNIFICANT CHANGE UP (ref 0–6)
EPI CELLS # UR: ABNORMAL
GLUCOSE SERPL-MCNC: 107 MG/DL — SIGNIFICANT CHANGE UP (ref 70–115)
GLUCOSE UR QL: NEGATIVE MG/DL — SIGNIFICANT CHANGE UP
HCG UR QL: NEGATIVE — SIGNIFICANT CHANGE UP
HCT VFR BLD CALC: 45.9 % — SIGNIFICANT CHANGE UP (ref 37–47)
HGB BLD-MCNC: 14.8 G/DL — SIGNIFICANT CHANGE UP (ref 12–16)
KETONES UR-MCNC: NEGATIVE — SIGNIFICANT CHANGE UP
LEUKOCYTE ESTERASE UR-ACNC: ABNORMAL
LYMPHOCYTES # BLD AUTO: 1.8 K/UL — SIGNIFICANT CHANGE UP (ref 1–4.8)
LYMPHOCYTES # BLD AUTO: 17.9 % — LOW (ref 20–55)
MCHC RBC-ENTMCNC: 28.1 PG — SIGNIFICANT CHANGE UP (ref 27–31)
MCHC RBC-ENTMCNC: 32.2 G/DL — SIGNIFICANT CHANGE UP (ref 32–36)
MCV RBC AUTO: 87.1 FL — SIGNIFICANT CHANGE UP (ref 81–99)
MONOCYTES # BLD AUTO: 0.6 K/UL — SIGNIFICANT CHANGE UP (ref 0–0.8)
MONOCYTES NFR BLD AUTO: 6.4 % — SIGNIFICANT CHANGE UP (ref 3–10)
NEUTROPHILS # BLD AUTO: 7.4 K/UL — SIGNIFICANT CHANGE UP (ref 1.8–8)
NEUTROPHILS NFR BLD AUTO: 74.8 % — HIGH (ref 37–73)
NITRITE UR-MCNC: NEGATIVE — SIGNIFICANT CHANGE UP
PH UR: 7 — SIGNIFICANT CHANGE UP (ref 5–8)
PLATELET # BLD AUTO: 284 K/UL — SIGNIFICANT CHANGE UP (ref 150–400)
POTASSIUM SERPL-MCNC: 3.9 MMOL/L — SIGNIFICANT CHANGE UP (ref 3.5–5.3)
POTASSIUM SERPL-SCNC: 3.9 MMOL/L — SIGNIFICANT CHANGE UP (ref 3.5–5.3)
PROT SERPL-MCNC: 7.1 G/DL — SIGNIFICANT CHANGE UP (ref 6.6–8.7)
PROT UR-MCNC: NEGATIVE MG/DL — SIGNIFICANT CHANGE UP
RBC # BLD: 5.27 M/UL — HIGH (ref 4.4–5.2)
RBC # FLD: 14.8 % — SIGNIFICANT CHANGE UP (ref 11–15.6)
RBC CASTS # UR COMP ASSIST: ABNORMAL /HPF (ref 0–4)
SODIUM SERPL-SCNC: 142 MMOL/L — SIGNIFICANT CHANGE UP (ref 135–145)
SP GR SPEC: 1.01 — SIGNIFICANT CHANGE UP (ref 1.01–1.02)
UROBILINOGEN FLD QL: NEGATIVE MG/DL — SIGNIFICANT CHANGE UP
WBC # BLD: 9.9 K/UL — SIGNIFICANT CHANGE UP (ref 4.8–10.8)
WBC # FLD AUTO: 9.9 K/UL — SIGNIFICANT CHANGE UP (ref 4.8–10.8)
WBC UR QL: SIGNIFICANT CHANGE UP

## 2018-05-23 PROCEDURE — 93010 ELECTROCARDIOGRAM REPORT: CPT

## 2018-05-23 PROCEDURE — 99213 OFFICE O/P EST LOW 20 MIN: CPT

## 2018-05-23 PROCEDURE — 99284 EMERGENCY DEPT VISIT MOD MDM: CPT

## 2018-05-23 PROCEDURE — 93005 ELECTROCARDIOGRAM TRACING: CPT

## 2018-05-23 PROCEDURE — 70450 CT HEAD/BRAIN W/O DYE: CPT | Mod: 26

## 2018-05-23 PROCEDURE — 81001 URINALYSIS AUTO W/SCOPE: CPT

## 2018-05-23 PROCEDURE — 80053 COMPREHEN METABOLIC PANEL: CPT

## 2018-05-23 PROCEDURE — 70450 CT HEAD/BRAIN W/O DYE: CPT

## 2018-05-23 PROCEDURE — 96375 TX/PRO/DX INJ NEW DRUG ADDON: CPT

## 2018-05-23 PROCEDURE — 71045 X-RAY EXAM CHEST 1 VIEW: CPT

## 2018-05-23 PROCEDURE — 96374 THER/PROPH/DIAG INJ IV PUSH: CPT

## 2018-05-23 PROCEDURE — 72125 CT NECK SPINE W/O DYE: CPT

## 2018-05-23 PROCEDURE — 72125 CT NECK SPINE W/O DYE: CPT | Mod: 26

## 2018-05-23 PROCEDURE — 71045 X-RAY EXAM CHEST 1 VIEW: CPT | Mod: 26

## 2018-05-23 PROCEDURE — 81025 URINE PREGNANCY TEST: CPT

## 2018-05-23 PROCEDURE — 36415 COLL VENOUS BLD VENIPUNCTURE: CPT

## 2018-05-23 PROCEDURE — 85027 COMPLETE CBC AUTOMATED: CPT

## 2018-05-23 PROCEDURE — 99284 EMERGENCY DEPT VISIT MOD MDM: CPT | Mod: 25

## 2018-05-23 RX ORDER — KETOROLAC TROMETHAMINE 30 MG/ML
30 SYRINGE (ML) INJECTION ONCE
Qty: 0 | Refills: 0 | Status: DISCONTINUED | OUTPATIENT
Start: 2018-05-23 | End: 2018-05-23

## 2018-05-23 RX ORDER — SODIUM CHLORIDE 9 MG/ML
2000 INJECTION INTRAMUSCULAR; INTRAVENOUS; SUBCUTANEOUS ONCE
Qty: 0 | Refills: 0 | Status: COMPLETED | OUTPATIENT
Start: 2018-05-23 | End: 2018-05-23

## 2018-05-23 RX ORDER — METOPROLOL TARTRATE 50 MG
0 TABLET ORAL
Qty: 0 | Refills: 0 | COMMUNITY

## 2018-05-23 RX ORDER — CYCLOBENZAPRINE HYDROCHLORIDE 10 MG/1
0 TABLET, FILM COATED ORAL
Qty: 0 | Refills: 0 | COMMUNITY

## 2018-05-23 RX ADMIN — Medication 1 MILLIGRAM(S): at 14:36

## 2018-05-23 RX ADMIN — SODIUM CHLORIDE 2000 MILLILITER(S): 9 INJECTION INTRAMUSCULAR; INTRAVENOUS; SUBCUTANEOUS at 15:20

## 2018-05-23 RX ADMIN — Medication 30 MILLIGRAM(S): at 14:41

## 2018-05-23 NOTE — ED PROVIDER NOTE - OBJECTIVE STATEMENT
39 y/o F, with hx of Chiari I malformation, tethered spinal cord syndrome, Su-Danlos syndrome, asthma, and cervical fusion, BIBA to the ED s/p fall and seizure.  As per mother, she heard a loud fall and ran to check on pt.  Mother then states that she witnessed a tonic-clonic seizure lasting approximately 10 minutes.  Pt has no hx of seizures.  Mother is unsure if pt is on blood thinners.  Upon arrival to the ED, pt is post ictal, with a C-collar in place..  Pt currently c/o neck pain, but states that pain is not different from baseline.  Pt also states her vision is burred, headache when coughing, and she feels nauseous.  Denies fever, chills, 39 y/o F, with hx of Chiari I malformation, tethered spinal cord syndrome, Su-Danlos syndrome, asthma, and cervical fusion, BIBA to the ED s/p fall and seizure.  As per mother, she heard a loud fall and ran to check on pt.  Mother then states that she witnessed a tonic-clonic seizure lasting approximately 10 minutes.  Pt has no hx of seizures.  Mother is unsure if pt is on blood thinners.  Upon arrival to the ED, pt is post ictal, with a C-collar in place..  Pt currently c/o neck pain, but states that pain is not different from baseline.  Pt also states her vision is burred, headache when coughing, and she feels nauseous.  Pt was seen by Dr. Murray this morning in the office.  Denies fever, chills, V/D, abd pain, or CP.

## 2018-05-23 NOTE — ED ADULT NURSE NOTE - OBJECTIVE STATEMENT
Patient A&OX3, c/o headache and neck pain. s/p new onset seizures. mother reports she heard a fall and then witnessed tonic-clonic seizures lasting approx 10 minutes. CM in place tachy on monitor. CODE team 2 and Dr Chauhan at bedside. C collar in place.

## 2018-05-23 NOTE — ED PROVIDER NOTE - PROGRESS NOTE DETAILS
Pt was seen by Dr. Murray [phone] discussed with DR Cabezas . patient can be evaluated at their office    tachycardia is baseline for patient

## 2018-05-23 NOTE — ED PROVIDER NOTE - CONSTITUTIONAL, MLM
normal... Initially post ictal, but returned to baseline, awake, alert, oriented to person, place, time/situation and in no apparent distress.

## 2018-05-23 NOTE — ED ADULT TRIAGE NOTE - CHIEF COMPLAINT QUOTE
pt comes to ed new onset seizures. mother reports she heard a fall and then witnessed tonic-clonic seizures lasting approx 10 minutes. hx of chiari malformation. unsure of blood thinners. postictal

## 2018-05-23 NOTE — ED PROVIDER NOTE - MUSCULOSKELETAL, MLM
Spine appears normal, range of motion is not limited, tenderness over C-spine. FROM all extremities x4.

## 2018-05-24 ENCOUNTER — OUTPATIENT (OUTPATIENT)
Dept: OUTPATIENT SERVICES | Facility: HOSPITAL | Age: 38
LOS: 1 days | End: 2018-05-24
Payer: MEDICARE

## 2018-05-24 ENCOUNTER — APPOINTMENT (OUTPATIENT)
Dept: NEUROSURGERY | Facility: CLINIC | Age: 38
End: 2018-05-24
Payer: MEDICARE

## 2018-05-24 VITALS
WEIGHT: 180 LBS | HEART RATE: 114 BPM | BODY MASS INDEX: 31.89 KG/M2 | SYSTOLIC BLOOD PRESSURE: 135 MMHG | HEIGHT: 63 IN | OXYGEN SATURATION: 96 % | DIASTOLIC BLOOD PRESSURE: 85 MMHG | TEMPERATURE: 97.9 F

## 2018-05-24 VITALS
DIASTOLIC BLOOD PRESSURE: 92 MMHG | RESPIRATION RATE: 16 BRPM | SYSTOLIC BLOOD PRESSURE: 139 MMHG | HEART RATE: 114 BPM | WEIGHT: 184.97 LBS | HEIGHT: 63 IN | TEMPERATURE: 98 F | OXYGEN SATURATION: 97 %

## 2018-05-24 DIAGNOSIS — R55 SYNCOPE AND COLLAPSE: Chronic | ICD-10-CM

## 2018-05-24 DIAGNOSIS — Z98.89 OTHER SPECIFIED POSTPROCEDURAL STATES: Chronic | ICD-10-CM

## 2018-05-24 DIAGNOSIS — Z98.890 OTHER SPECIFIED POSTPROCEDURAL STATES: Chronic | ICD-10-CM

## 2018-05-24 DIAGNOSIS — G93.5 COMPRESSION OF BRAIN: ICD-10-CM

## 2018-05-24 DIAGNOSIS — R56.9 UNSPECIFIED CONVULSIONS: Chronic | ICD-10-CM

## 2018-05-24 PROCEDURE — 86900 BLOOD TYPING SEROLOGIC ABO: CPT

## 2018-05-24 PROCEDURE — G0463: CPT

## 2018-05-24 PROCEDURE — 86901 BLOOD TYPING SEROLOGIC RH(D): CPT

## 2018-05-24 PROCEDURE — 99214 OFFICE O/P EST MOD 30 MIN: CPT

## 2018-05-24 PROCEDURE — 86850 RBC ANTIBODY SCREEN: CPT

## 2018-05-24 RX ORDER — METOPROLOL TARTRATE 50 MG
0.5 TABLET ORAL
Qty: 0 | Refills: 0 | COMMUNITY

## 2018-05-24 NOTE — H&P PST ADULT - OTHER CARE PROVIDERS
neurosurgery Dr. Murray , cardio Dr. Carlo Parada [phone] ( seen 2/2018 inpatient ) , cardiology Dr. Yaritza Ceballos [phone] neurosurgery Dr. Murray , cardio Dr. Carlo Parada [phone] ( seen 2/2018 inpatient ) , cardiology Dr. Yaritza Ceballos [phone] ( last visit 2017)

## 2018-05-24 NOTE — H&P PST ADULT - NEGATIVE CARDIOVASCULAR SYMPTOMS
no peripheral edema/no chest pain/no claudication/no paroxysmal nocturnal dyspnea/no orthopnea/no dyspnea on exertion/no palpitations

## 2018-05-24 NOTE — H&P PST ADULT - PROBLEM SELECTOR PLAN 1
cerebral angio   PST instructions provided, patient verbalized understanding.   CBC, CMP , CXR, EKG in sunrise reviewed. ECHO in HIE.  T&S collected and send.  UcG on admission

## 2018-05-24 NOTE — H&P PST ADULT - PSH
H/O:     History of back surgery  2017--  T12 vertebral column resection, T8-L2 instrumentation and fusion.  History of cholecystectomy  laparoscopic   History of tonsillectomy  age 18  S/P lumbar laminectomy  , complicated with CSF leak, S/P blood patch  S/P myelogram  c/b CSF leak, spinal headache, S/P blood patch H/O craniotomy  16 suboccipital crani for C1 arch resection w posterior occipital cervical hardware fusion to C2 , was revised 18.  H/O:     History of back surgery  2017--  T12 vertebral column resection, T8-L2 instrumentation and fusion.  History of cholecystectomy  laparoscopic   History of tonsillectomy  age 18  S/P lumbar laminectomy  , complicated with CSF leak, S/P blood patch  S/P myelogram  c/b CSF leak, spinal headache, S/P blood patch

## 2018-05-24 NOTE — H&P PST ADULT - HISTORY OF PRESENT ILLNESS
36 y/o female with pmhx of Chiari malformation, EDS, tethered cord syndrome, worsening low back pain and neck pain for the past several years with progressive weakness in her bilateral LE. She states she was originially scheduled for surgery in April but the surgery was not performed "because there were not enough people for neuromonitoring." Presents now for T12 vertebral column resection, T10-L2 instrumentation and fusion. 39 y/o female with Chiari Malformation, Craniovertebral instability, [facility]  ER visit yesterday (5/23/18)  due to  new onset seizures and syncopal episodes,  was discharged without anti seizure medications. Saw Dr. Charles today. Blood work, EKG, CXR  in sunrise. Patient presents to PST today for scheduled cerebral angiogram on 5/29/18. Denies fever, chills, wear neck collar , ambulating with cane. Accompanied by mother.

## 2018-05-24 NOTE — H&P PST ADULT - NEUROLOGICAL COMMENTS
first seizure yesterday 5/23/18 , went to ER first seizure yesterday 5/23/18 , went to ER [facility] , no medication for seizures

## 2018-05-24 NOTE — H&P PST ADULT - PMH
Asthma  no recent exacerbations  Cervical disc disease  cervical fusion  Chiari I malformation  decompression Feb 2018  EDS (Su-Danlos syndrome)    Irritable bowel syndrome without diarrhea  with constipation  Low back pain  chronic 2012  Tethered cord syndrome Cervical disc disease  h/o cervical fusion  Chiari I malformation  diagnsoed in 2016; decompression Feb 2018  Craniovertebral instability    EDS (Su-Danlos syndrome)    History of asthma  without exacerbations  Irritable bowel syndrome without diarrhea  with constipation  Low back pain  chronic 2012  Seizure  onset 5/23/18, ER visit to [facility] . Was discharged on no anti seizure medications .  Sinus tachycardia  on Metoprolol ( controlled )  Syncope  episodes when flexes her neck forward  Tethered cord syndrome

## 2018-05-29 ENCOUNTER — APPOINTMENT (OUTPATIENT)
Dept: NEUROSURGERY | Facility: HOSPITAL | Age: 38
End: 2018-05-29

## 2018-05-29 ENCOUNTER — OUTPATIENT (OUTPATIENT)
Dept: OUTPATIENT SERVICES | Facility: HOSPITAL | Age: 38
LOS: 1 days | End: 2018-05-29
Payer: MEDICARE

## 2018-05-29 DIAGNOSIS — G93.5 COMPRESSION OF BRAIN: ICD-10-CM

## 2018-05-29 DIAGNOSIS — Z98.890 OTHER SPECIFIED POSTPROCEDURAL STATES: Chronic | ICD-10-CM

## 2018-05-29 DIAGNOSIS — Z98.89 OTHER SPECIFIED POSTPROCEDURAL STATES: Chronic | ICD-10-CM

## 2018-05-29 PROCEDURE — C1887: CPT

## 2018-05-29 PROCEDURE — 36224 PLACE CATH CAROTD ART: CPT

## 2018-05-29 PROCEDURE — 36227 PLACE CATH XTRNL CAROTID: CPT | Mod: 50

## 2018-05-29 PROCEDURE — 36227 PLACE CATH XTRNL CAROTID: CPT

## 2018-05-29 PROCEDURE — 36226 PLACE CATH VERTEBRAL ART: CPT

## 2018-05-29 PROCEDURE — 36226 PLACE CATH VERTEBRAL ART: CPT | Mod: 50

## 2018-05-29 PROCEDURE — 36224 PLACE CATH CAROTD ART: CPT | Mod: 50

## 2018-05-29 RX ORDER — HYDROMORPHONE HYDROCHLORIDE 2 MG/ML
4 INJECTION INTRAMUSCULAR; INTRAVENOUS; SUBCUTANEOUS ONCE
Qty: 0 | Refills: 0 | Status: DISCONTINUED | OUTPATIENT
Start: 2018-05-29 | End: 2018-05-29

## 2018-05-29 RX ORDER — SIMETHICONE 80 MG/1
80 TABLET, CHEWABLE ORAL ONCE
Qty: 0 | Refills: 0 | Status: COMPLETED | OUTPATIENT
Start: 2018-05-29 | End: 2018-05-29

## 2018-05-29 RX ORDER — SODIUM CHLORIDE 9 MG/ML
1000 INJECTION INTRAMUSCULAR; INTRAVENOUS; SUBCUTANEOUS
Qty: 0 | Refills: 0 | Status: DISCONTINUED | OUTPATIENT
Start: 2018-05-29 | End: 2018-06-13

## 2018-05-29 RX ORDER — FAMOTIDINE 10 MG/ML
20 INJECTION INTRAVENOUS ONCE
Qty: 0 | Refills: 0 | Status: COMPLETED | OUTPATIENT
Start: 2018-05-29 | End: 2018-05-29

## 2018-05-29 RX ADMIN — HYDROMORPHONE HYDROCHLORIDE 4 MILLIGRAM(S): 2 INJECTION INTRAMUSCULAR; INTRAVENOUS; SUBCUTANEOUS at 17:17

## 2018-05-29 RX ADMIN — SODIUM CHLORIDE 70 MILLILITER(S): 9 INJECTION INTRAMUSCULAR; INTRAVENOUS; SUBCUTANEOUS at 19:00

## 2018-05-29 RX ADMIN — FAMOTIDINE 20 MILLIGRAM(S): 10 INJECTION INTRAVENOUS at 18:18

## 2018-05-29 RX ADMIN — HYDROMORPHONE HYDROCHLORIDE 4 MILLIGRAM(S): 2 INJECTION INTRAMUSCULAR; INTRAVENOUS; SUBCUTANEOUS at 17:20

## 2018-05-29 RX ADMIN — SIMETHICONE 80 MILLIGRAM(S): 80 TABLET, CHEWABLE ORAL at 19:41

## 2018-05-29 NOTE — CHART NOTE - NSCHARTNOTEFT_GEN_A_CORE
Interventional Neuro Radiology  Pre-Procedure Note PA-C    This is a 38 year old right hand dominant female          Allergies: Bee Stings (Anaphylaxis) Keflex (Anaphylaxis) latex (Rash) penicillins (Anaphylaxis)  PMHX: Sinus tachycardia: on Metoprolol asthma, Craniovertebral instability, syncope seizure., tethered cord syndrome, Su-Danlos syndrome, chiari I malformation, Irritable bowel syndrome, Cervical disc disease: h/o cervical fusion, Low back pain: chronic   PSHX: craniotomy: 16 suboccipital crani for C1 arch resection w posterior occipital cervical hardware fusion to C2 , was revised 18.  History of back surgery: 2017--  T12 vertebral column resection, T8-L2 instrumentation and fusion.  S/P myelogram: c/b CSF leak, spinal headache, S/P blood patch  History of tonsillectomy: age 18  History of cholecystectomy: laparoscopic   H/O: :   S/P lumbar laminectomy: , complicated with CSF leak,   Social History:   FAMILY HISTORY: non-contributory   Current Medications:     Complete Blood Count     WBC Count: 9.9 K/uLL    Hemoglobin: 14.8 g/dL    Hematocrit: 45.9 %    Platelet Count  284     Comprehensive Metabolic Panel     Sodium, Serum: 142 mmol/L    Potassium, Serum: 3.9 mmol/L    Chloride, Serum: 104 mmol/L    Carbon Dioxide, Serum: 23.0 mmol/L    Anion Gap, Serum: 15 mmol/L    Blood Urea Nitrogen, Serum: 13.0 mg/dL    Creatinine, Serum: 0.79 mg/dL    Glucose, Serum: 107 mg/dL    Blood Bank:       Assessment/Plan:   This is a 38 year old right  hand dominant Female  presents with   Patient presents to neuro-IR for selective cerebral angiography.   Procedure, goals, risks, benefits and alternatives  were discussed with patient and patient's family.  All questions were answered.  Risks include but are not limited to stroke, vessel injury, hemorrhage, and or right  groin hematoma.  Patient demonstrates understanding  of all risks involved with this procedure and wishes to continue.   Appropriate  content was obtained from patient and consent is in the patient's chart.

## 2018-05-29 NOTE — CHART NOTE - NSCHARTNOTEFT_GEN_A_CORE
Interventional Neuro- Radiology   Procedure Note MICHAEL    Procedure: Selective Cerebral Angiography   Pre- Procedure Diagnosis:  Post- Procedure Diagnosis:    : Dr Jeffrey Charles  Resident: Dr Cole Metzger     Physician Assistant: Natacha Lees PA-C    RN:    Anesthesiologist:  Sheath:    I/Os:  Estimated blood loss less than 10cc  IV fluids:     cc     Urine output     cc      Contrast Omnipaque 240      cc           Vitals: BP              Spo2 100    Preliminary Report:  Using a 5 Maltese short sheath to the right groin under MAC sedation via   left vertebral artery,  left common carotid artery, left external carotid artery, right vertebral artery, right common carotid artery, right external carotid artery  a selective cerebral angiography was performed and  demonstrates ________. ( Official note to follow).  Patient tolerated procedure well, hemodynamically stable, no change in neurological status compared to baseline.  Results discussed with neurosurgery, patient and patient's  family.  Groin sheath was removed,  manual compression held to hemostasis  for  21 minutes, no active bleeding, no hematoma, quick clot and safeguard balloon dressing applied at _____h.  Patient transported to Recovery Room Interventional Neuro- Radiology   Procedure Note PA-C    Procedure: Selective Cerebral Angiography   Pre- Procedure Diagnosis:  Chiari Malformation   Post- Procedure Diagnosis:    : Dr Jeffrey Charles  fellow:      Dr Gaviota Castillo  Resident: Dr Cole Metzger     Physician Assistant: Natacha Lees PA-C  Nurse:                       Sari Eller RN  Radiologic Tech:        Radha Miner LRT  Anesthesiologist:        Dr Harshil Ruffin  Sheath:                       5 Uzbek short sheath    I/Os:  Estimated blood loss less than 10cc IV fluids:    cc   Urine output due to void Contrast Omnipaque 240      cc           Vitals: /88   HR 96    Spo2 100    Preliminary Report:  Using a 5 Uzbek short sheath to the right groin under MAC sedation via right vertebral artery, right internal carotid artery, right external carotid artery, right vertebral artery, right common carotid artery, right external carotid artery  a selective cerebral angiography was performed and demonstrated                                                                                        Official note to follow.  Patient tolerated procedure well, hemodynamically stable, no change in neurological status compared to baseline.  Results discussed with neurosurgery, patient and patient's .  Groin sheath was removed, manual compression held to hemostasis for 21 minutes, no active bleeding, no hematoma, quick clot and safeguard balloon dressing applied at   Patient transported to Recovery Room Interventional Neuro- Radiology   Procedure Note PA-C    Procedure: Selective Cerebral Angiography   Pre- Procedure Diagnosis:  Chiari Malformation, cervical instability   Post- Procedure Diagnosis:    : Dr Jeffrey Charles  fellow:      Dr Gaviota Castillo  Resident: Dr Cole Metzger     Physician Assistant: Natacha Lees PA-C  Nurse:                       Sari Eller RN  Radiologic Tech:        Radha Miner LRT  Anesthesiologist:        Dr Harshil Ruffin  Sheath:                       5 Tam[place] short sheath    I/Os:  Estimated blood loss less than 10cc IV fluids:    cc   Urine output due to void Contrast Omnipaque 240      cc           Vitals: /88   HR 96   Spo2 100    Preliminary Report:  Using a 5 Tam[place] short sheath to the right groin under MAC sedation via right vertebral artery, right internal carotid artery, right external carotid artery, left vertebral artery,  left internal carotid artery, left external carotid artery a selective cerebral angiography was performed and demonstrated                                                                                        Official note to follow.  Patient tolerated procedure well, hemodynamically stable, no change in neurological status compared to baseline. Results discussed with neurosurgery, patient and patient's .  Right groin sheath was removed, manual compression held to hemostasis for 21 minutes, no active bleeding, no hematoma, quick clot and safeguard balloon dressing applied at   Patient transported to Recovery Room Interventional Neuro- Radiology   Procedure Note PA-C    Procedure: Selective Cerebral Angiography   Pre- Procedure Diagnosis:  Chiari Malformation, cervical instability   Post- Procedure Diagnosis:    : Dr Jeffrey Charles  fellow:      Dr Gaviota Castillo  Resident: Dr Cole Metzger     Physician Assistant: Natacha Lees PA-C  Nurse:                       Sari Eller RN  Radiologic Tech:        Radha Miner LRT  Anesthesiologist:        Dr Harshil Ruffin  Sheath:                       5 [place] short sheath    I/Os:  Estimated blood loss less than 10cc IV fluids:250cc  Urine output due to void  Contrast Omnipaque 240 150cc           Vitals: /88   HR 96   Spo2 100    Preliminary Report:  Using a 5 [place] short sheath to the right groin under MAC sedation via right vertebral artery, right internal carotid artery, right external carotid artery, left vertebral artery, left internal carotid artery, left external carotid artery a selective cerebral angiography was performed and demonstrated                                                                                        Official note to follow.  Patient tolerated procedure well, hemodynamically stable, no change in neurological status compared to baseline. Results discussed with neurosurgery, patient and patient's .  Right groin sheath was removed, manual compression held to hemostasis for 21 minutes, no active bleeding, no hematoma, quick clot and safeguard balloon dressing applied at   Patient transported to Recovery Room

## 2018-06-06 ENCOUNTER — APPOINTMENT (OUTPATIENT)
Dept: SPINE | Facility: CLINIC | Age: 38
End: 2018-06-06
Payer: MEDICARE

## 2018-06-06 VITALS — WEIGHT: 175 LBS | BODY MASS INDEX: 31.01 KG/M2 | HEIGHT: 63 IN

## 2018-06-06 PROCEDURE — 99024 POSTOP FOLLOW-UP VISIT: CPT

## 2018-06-11 DIAGNOSIS — Z03.89 ENCOUNTER FOR OBSERVATION FOR OTHER SUSPECTED DISEASES AND CONDITIONS RULED OUT: ICD-10-CM

## 2018-06-13 ENCOUNTER — TRANSCRIPTION ENCOUNTER (OUTPATIENT)
Age: 38
End: 2018-06-13

## 2018-07-10 ENCOUNTER — TRANSCRIPTION ENCOUNTER (OUTPATIENT)
Age: 38
End: 2018-07-10

## 2018-07-16 PROBLEM — G93.5 COMPRESSION OF BRAIN: Chronic | Status: ACTIVE | Noted: 2017-03-24

## 2018-07-17 PROBLEM — R56.9 UNSPECIFIED CONVULSIONS: Chronic | Status: ACTIVE | Noted: 2018-05-24

## 2018-07-17 PROBLEM — Z87.09 PERSONAL HISTORY OF OTHER DISEASES OF THE RESPIRATORY SYSTEM: Chronic | Status: ACTIVE | Noted: 2018-05-24

## 2018-08-28 ENCOUNTER — APPOINTMENT (OUTPATIENT)
Dept: MRI IMAGING | Facility: CLINIC | Age: 38
End: 2018-08-28
Payer: MEDICARE

## 2018-08-28 ENCOUNTER — OUTPATIENT (OUTPATIENT)
Dept: OUTPATIENT SERVICES | Facility: HOSPITAL | Age: 38
LOS: 1 days | End: 2018-08-28

## 2018-08-28 DIAGNOSIS — Z98.890 OTHER SPECIFIED POSTPROCEDURAL STATES: Chronic | ICD-10-CM

## 2018-08-28 DIAGNOSIS — Z98.89 OTHER SPECIFIED POSTPROCEDURAL STATES: Chronic | ICD-10-CM

## 2018-08-28 DIAGNOSIS — Z00.8 ENCOUNTER FOR OTHER GENERAL EXAMINATION: ICD-10-CM

## 2018-08-28 PROBLEM — M53.2X1 SPINAL INSTABILITIES, OCCIPITO-ATLANTO-AXIAL REGION: Chronic | Status: ACTIVE | Noted: 2018-05-24

## 2018-08-28 PROBLEM — Q06.8 OTHER SPECIFIED CONGENITAL MALFORMATIONS OF SPINAL CORD: Chronic | Status: ACTIVE | Noted: 2017-04-20

## 2018-08-28 PROBLEM — R00.0 TACHYCARDIA, UNSPECIFIED: Chronic | Status: ACTIVE | Noted: 2018-05-24

## 2018-08-28 PROBLEM — Q79.6 EHLERS-DANLOS SYNDROMES: Chronic | Status: ACTIVE | Noted: 2017-03-24

## 2018-08-28 PROCEDURE — 72156 MRI NECK SPINE W/O & W/DYE: CPT | Mod: 26

## 2018-09-02 ENCOUNTER — EMERGENCY (EMERGENCY)
Facility: HOSPITAL | Age: 38
LOS: 1 days | Discharge: HOME | End: 2018-09-02
Attending: EMERGENCY MEDICINE
Payer: MEDICARE

## 2018-09-02 VITALS
HEART RATE: 104 BPM | WEIGHT: 166.89 LBS | HEIGHT: 62 IN | TEMPERATURE: 100 F | RESPIRATION RATE: 24 BRPM | OXYGEN SATURATION: 99 % | DIASTOLIC BLOOD PRESSURE: 90 MMHG | SYSTOLIC BLOOD PRESSURE: 130 MMHG

## 2018-09-02 VITALS — TEMPERATURE: 100 F

## 2018-09-02 DIAGNOSIS — Z98.890 OTHER SPECIFIED POSTPROCEDURAL STATES: Chronic | ICD-10-CM

## 2018-09-02 DIAGNOSIS — Z98.89 OTHER SPECIFIED POSTPROCEDURAL STATES: Chronic | ICD-10-CM

## 2018-09-02 LAB
ANION GAP SERPL CALC-SCNC: 11 MMOL/L — SIGNIFICANT CHANGE UP (ref 5–17)
BASOPHILS # BLD AUTO: 0 K/UL — SIGNIFICANT CHANGE UP (ref 0–0.2)
BASOPHILS NFR BLD AUTO: 0.3 % — SIGNIFICANT CHANGE UP (ref 0–2)
BUN SERPL-MCNC: 9 MG/DL — SIGNIFICANT CHANGE UP (ref 8–20)
CALCIUM SERPL-MCNC: 8.8 MG/DL — SIGNIFICANT CHANGE UP (ref 8.6–10.2)
CHLORIDE SERPL-SCNC: 108 MMOL/L — HIGH (ref 98–107)
CK SERPL-CCNC: 54 U/L — SIGNIFICANT CHANGE UP (ref 25–170)
CO2 SERPL-SCNC: 23 MMOL/L — SIGNIFICANT CHANGE UP (ref 22–29)
CREAT SERPL-MCNC: 0.69 MG/DL — SIGNIFICANT CHANGE UP (ref 0.5–1.3)
EOSINOPHIL # BLD AUTO: 0.1 K/UL — SIGNIFICANT CHANGE UP (ref 0–0.5)
EOSINOPHIL NFR BLD AUTO: 1.3 % — SIGNIFICANT CHANGE UP (ref 0–6)
GLUCOSE SERPL-MCNC: 93 MG/DL — SIGNIFICANT CHANGE UP (ref 70–115)
HCT VFR BLD CALC: 42.1 % — SIGNIFICANT CHANGE UP (ref 37–47)
HGB BLD-MCNC: 13.3 G/DL — SIGNIFICANT CHANGE UP (ref 12–16)
LYMPHOCYTES # BLD AUTO: 2.5 K/UL — SIGNIFICANT CHANGE UP (ref 1–4.8)
LYMPHOCYTES # BLD AUTO: 33.1 % — SIGNIFICANT CHANGE UP (ref 20–55)
MAGNESIUM SERPL-MCNC: 2.1 MG/DL — SIGNIFICANT CHANGE UP (ref 1.6–2.6)
MCHC RBC-ENTMCNC: 29.4 PG — SIGNIFICANT CHANGE UP (ref 27–31)
MCHC RBC-ENTMCNC: 31.6 G/DL — LOW (ref 32–36)
MCV RBC AUTO: 92.9 FL — SIGNIFICANT CHANGE UP (ref 81–99)
MONOCYTES # BLD AUTO: 0.5 K/UL — SIGNIFICANT CHANGE UP (ref 0–0.8)
MONOCYTES NFR BLD AUTO: 6.3 % — SIGNIFICANT CHANGE UP (ref 3–10)
NEUTROPHILS # BLD AUTO: 4.4 K/UL — SIGNIFICANT CHANGE UP (ref 1.8–8)
NEUTROPHILS NFR BLD AUTO: 58.7 % — SIGNIFICANT CHANGE UP (ref 37–73)
PHOSPHATE SERPL-MCNC: 3.6 MG/DL — SIGNIFICANT CHANGE UP (ref 2.4–4.7)
PLATELET # BLD AUTO: 294 K/UL — SIGNIFICANT CHANGE UP (ref 150–400)
POTASSIUM SERPL-MCNC: 4 MMOL/L — SIGNIFICANT CHANGE UP (ref 3.5–5.3)
POTASSIUM SERPL-SCNC: 4 MMOL/L — SIGNIFICANT CHANGE UP (ref 3.5–5.3)
RBC # BLD: 4.53 M/UL — SIGNIFICANT CHANGE UP (ref 4.4–5.2)
RBC # FLD: 13.7 % — SIGNIFICANT CHANGE UP (ref 11–15.6)
SODIUM SERPL-SCNC: 142 MMOL/L — SIGNIFICANT CHANGE UP (ref 135–145)
WBC # BLD: 7.5 K/UL — SIGNIFICANT CHANGE UP (ref 4.8–10.8)
WBC # FLD AUTO: 7.5 K/UL — SIGNIFICANT CHANGE UP (ref 4.8–10.8)

## 2018-09-02 PROCEDURE — 80048 BASIC METABOLIC PNL TOTAL CA: CPT

## 2018-09-02 PROCEDURE — 84100 ASSAY OF PHOSPHORUS: CPT

## 2018-09-02 PROCEDURE — 83735 ASSAY OF MAGNESIUM: CPT

## 2018-09-02 PROCEDURE — 82550 ASSAY OF CK (CPK): CPT

## 2018-09-02 PROCEDURE — 99283 EMERGENCY DEPT VISIT LOW MDM: CPT

## 2018-09-02 PROCEDURE — 36415 COLL VENOUS BLD VENIPUNCTURE: CPT

## 2018-09-02 PROCEDURE — 99284 EMERGENCY DEPT VISIT MOD MDM: CPT

## 2018-09-02 PROCEDURE — 85027 COMPLETE CBC AUTOMATED: CPT

## 2018-09-02 NOTE — ED PROVIDER NOTE - PHYSICAL EXAMINATION
neuro: CN II - XII intact, EOMI, PERRL, no papilledema, 5/5 muscle strength x 4 extremities, no sensory deficits, 2+ dtr globally, negative babinski, no ataxic gait, normal RADHA and FNT, normal romberg

## 2018-09-02 NOTE — ED ADULT NURSE NOTE - OBJECTIVE STATEMENT
37y/o female. c.o seizure today. PT aox4, resp even unlabored, denies chest pain, SOB, numbness or tingling, injury or trauma, No other complaints at this time. will continue to monitor

## 2018-09-02 NOTE — ED PROVIDER NOTE - PMH
Cervical disc disease  h/o cervical fusion  Chiari I malformation  diagnsoed in 2016; decompression Feb 2018  Craniovertebral instability    EDS (Su-Danlos syndrome)    History of asthma  without exacerbations  Irritable bowel syndrome without diarrhea  with constipation  Low back pain  chronic 2012  Seizure  onset 5/23/18, ER visit to [facility] . Was discharged on no anti seizure medications .  Sinus tachycardia  on Metoprolol ( controlled )  Syncope  episodes when flexes her neck forward  Tethered cord syndrome

## 2018-09-02 NOTE — ED PROVIDER NOTE - SKIN, MLM
Skin normal color for race, warm, dry and intact. No evidence of rash. Well healed cervical surgical scar, which is clean dry and intact, no surrounding erythema, no evidence of cellulitis.

## 2018-09-02 NOTE — ED ADULT NURSE NOTE - NSIMPLEMENTINTERV_GEN_ALL_ED
Implemented All Universal Safety Interventions:  [place] to call system. Call bell, personal items and telephone within reach. Instruct patient to call for assistance. Room bathroom lighting operational. Non-slip footwear when patient is off stretcher. Physically safe environment: no spills, clutter or unnecessary equipment. Stretcher in lowest position, wheels locked, appropriate side rails in place.

## 2018-09-02 NOTE — ED ADULT NURSE NOTE - CHIEF COMPLAINT QUOTE
I had two seizures today. Pt. states Hx of Posterior Fossa Decompression at [facility] last month, has been seen since for possible infection. In ED pt. A&Ox3, answering all questions

## 2018-09-02 NOTE — ED ADULT NURSE NOTE - PMH
Cervical disc disease  h/o cervical fusion  Chiari I malformation  diagnsoed in 2016; decompression Feb 2018  Craniovertebral instability    EDS (Su-Danlos syndrome)    History of asthma  without exacerbations  Irritable bowel syndrome without diarrhea  with constipation  Low back pain  chronic 2012  Seizure  onset 5/23/18, ER visit to [facility] . Was discharged on no anti seizure medications .  Sinus tachycardia  on Metoprolol ( controlled )  Syncope  episodes when flexes her neck forward  Tethered cord syndrome

## 2018-09-02 NOTE — ED PROVIDER NOTE - OBJECTIVE STATEMENT
37 y/o F with PMHx of cervical disc disease, Chiari I malformation, craniovertebral instability, EDS, asthma, IBS, low back pain, seizures, syncope and tethered cord syndrome and PSHx of craniotomy, lumbar laminectomy and myelogram presents to ED s/p seizure today. Association headache, numbness and tingling in shoulders and weakness in b/l legs. Patient has an extensive neurological history, and had a posterior fossa decompression 3-4 weeks ago. Notes that 1 week after the surgery, she was re-hospitalized due to complication/abscess at wound site, requiring IV antibiotics. States she had 2 seizures prior to the recent surgery and this is the first seizure since. Currently takes Topamax, Cymbalta and Flexeril daily. She recently had an MRI performed (ordered by neurosurgeon) but as of Friday at follow-up outpatient, doctor still did not have the results. She states that her surgeon was concerned for possible reinfection or epidural abscess. denies fever (has not been taking temperature at home). no chest pain, cough or sob. no abd pain. no n/v/d. no urinary f/u/d. no back pain. no motor or sensory deficits. denies illicit drug use. no recent travel. no rash. no other acute issues symptoms or concerns.     Neurosurgeon: Dr. Yang Diego  Hospital: [facility] 39 y/o F with PMHx of cervical disc disease, Chiari I malformation, craniovertebral instability, EDS, asthma, IBS, low back pain, seizures, syncope and tethered cord syndrome and PSHx of craniotomy, lumbar laminectomy and myelogram presents to ED s/p seizure today. Association headache, numbness and tingling in shoulders and weakness in b/l legs. Patient has an extensive neurological history, and had a posterior fossa decompression 3-4 weeks ago. Notes that 1 week after the surgery, she was re-hospitalized due to complication/abscess at wound site, requiring IV antibiotics. States she had 2 seizures prior to the recent surgery and this is the first seizure since. Currently takes Topamax, Cymbalta and Flexeril daily. She recently had an MRI performed (ordered by neurosurgeon) but as of Friday at follow-up outpatient, doctor still did not have the results. She states that her surgeon was concerned for possible reinfection or epidural abscess. denies fever (has not been taking temperature at home). no chest pain, cough or sob. no abd pain. no n/v/d. no urinary f/u/d. no back pain.  denies illicit drug use. no recent travel. no rash. no other acute issues symptoms or concerns.     Neurosurgeon: Dr. Yang Diego  [facility]: [facility]

## 2018-09-02 NOTE — ED PROVIDER NOTE - MEDICAL DECISION MAKING DETAILS
return to ed for intractable HA, persistent vomiting, or new onset motor/sensory deficits   spoke with neurosurgery agree labs neg f/u established neurosurgeon non toxic steady gait in nad

## 2018-09-02 NOTE — ED PROVIDER NOTE - PROGRESS NOTE DETAILS
Spoke with jose luis Galarza for patient's neurosurgeon, (contact number [phone]), who recommends seizure workup and immediate followup with Dr. Diego.

## 2018-09-10 ENCOUNTER — MEDICATION RENEWAL (OUTPATIENT)
Age: 38
End: 2018-09-10

## 2018-09-26 ENCOUNTER — CLINICAL ADVICE (OUTPATIENT)
Age: 38
End: 2018-09-26

## 2018-09-26 ENCOUNTER — APPOINTMENT (OUTPATIENT)
Dept: SPINE | Facility: CLINIC | Age: 38
End: 2018-09-26
Payer: MEDICARE

## 2018-09-26 DIAGNOSIS — Z01.818 ENCOUNTER FOR OTHER PREPROCEDURAL EXAMINATION: ICD-10-CM

## 2018-09-26 DIAGNOSIS — M21.371 FOOT DROP, RIGHT FOOT: ICD-10-CM

## 2018-09-26 PROCEDURE — 99213 OFFICE O/P EST LOW 20 MIN: CPT

## 2018-10-01 ENCOUNTER — EMERGENCY (EMERGENCY)
Facility: HOSPITAL | Age: 38
LOS: 1 days | Discharge: LEFT BEFORE DISCHARGE | End: 2018-10-01
Attending: STUDENT IN AN ORGANIZED HEALTH CARE EDUCATION/TRAINING PROGRAM
Payer: MEDICARE

## 2018-10-01 ENCOUNTER — FORM ENCOUNTER (OUTPATIENT)
Age: 38
End: 2018-10-01

## 2018-10-01 DIAGNOSIS — Z98.89 OTHER SPECIFIED POSTPROCEDURAL STATES: Chronic | ICD-10-CM

## 2018-10-01 DIAGNOSIS — Z98.890 OTHER SPECIFIED POSTPROCEDURAL STATES: Chronic | ICD-10-CM

## 2018-10-01 LAB
ALBUMIN SERPL ELPH-MCNC: 4.1 G/DL — SIGNIFICANT CHANGE UP (ref 3.3–5.2)
ALP SERPL-CCNC: 87 U/L — SIGNIFICANT CHANGE UP (ref 40–120)
ALT FLD-CCNC: 17 U/L — SIGNIFICANT CHANGE UP
ANION GAP SERPL CALC-SCNC: 12 MMOL/L — SIGNIFICANT CHANGE UP (ref 5–17)
APTT BLD: 33.9 SEC — SIGNIFICANT CHANGE UP (ref 27.5–37.4)
AST SERPL-CCNC: 13 U/L — SIGNIFICANT CHANGE UP
BASE EXCESS BLDV CALC-SCNC: -2.9 MMOL/L — LOW (ref -2–2)
BASOPHILS # BLD AUTO: 0 K/UL — SIGNIFICANT CHANGE UP (ref 0–0.2)
BASOPHILS NFR BLD AUTO: 0.1 % — SIGNIFICANT CHANGE UP (ref 0–2)
BILIRUB SERPL-MCNC: <0.2 MG/DL — LOW (ref 0.4–2)
BLD GP AB SCN SERPL QL: SIGNIFICANT CHANGE UP
BUN SERPL-MCNC: 11 MG/DL — SIGNIFICANT CHANGE UP (ref 8–20)
CA-I SERPL-SCNC: 1.19 MMOL/L — SIGNIFICANT CHANGE UP (ref 1.15–1.33)
CALCIUM SERPL-MCNC: 9.1 MG/DL — SIGNIFICANT CHANGE UP (ref 8.6–10.2)
CHLORIDE BLDV-SCNC: 112 MMOL/L — HIGH (ref 98–107)
CHLORIDE SERPL-SCNC: 107 MMOL/L — SIGNIFICANT CHANGE UP (ref 98–107)
CO2 SERPL-SCNC: 22 MMOL/L — SIGNIFICANT CHANGE UP (ref 22–29)
CREAT SERPL-MCNC: 0.65 MG/DL — SIGNIFICANT CHANGE UP (ref 0.5–1.3)
EOSINOPHIL # BLD AUTO: 0.1 K/UL — SIGNIFICANT CHANGE UP (ref 0–0.5)
EOSINOPHIL NFR BLD AUTO: 1.2 % — SIGNIFICANT CHANGE UP (ref 0–6)
GAS PNL BLDV: 144 MMOL/L — SIGNIFICANT CHANGE UP (ref 135–145)
GAS PNL BLDV: SIGNIFICANT CHANGE UP
GAS PNL BLDV: SIGNIFICANT CHANGE UP
GLUCOSE BLDV-MCNC: 80 MG/DL — SIGNIFICANT CHANGE UP (ref 70–99)
GLUCOSE SERPL-MCNC: 85 MG/DL — SIGNIFICANT CHANGE UP (ref 70–115)
HCO3 BLDV-SCNC: 21 MMOL/L — SIGNIFICANT CHANGE UP (ref 21–29)
HCT VFR BLD CALC: 44.3 % — SIGNIFICANT CHANGE UP (ref 37–47)
HGB BLD-MCNC: 13.7 G/DL — SIGNIFICANT CHANGE UP (ref 12–16)
INR BLD: 0.99 RATIO — SIGNIFICANT CHANGE UP (ref 0.88–1.16)
LACTATE BLDV-MCNC: 2.2 MMOL/L — HIGH (ref 0.5–2)
LYMPHOCYTES # BLD AUTO: 2.4 K/UL — SIGNIFICANT CHANGE UP (ref 1–4.8)
LYMPHOCYTES # BLD AUTO: 25.2 % — SIGNIFICANT CHANGE UP (ref 20–55)
MCHC RBC-ENTMCNC: 29.4 PG — SIGNIFICANT CHANGE UP (ref 27–31)
MCHC RBC-ENTMCNC: 30.9 G/DL — LOW (ref 32–36)
MCV RBC AUTO: 95.1 FL — SIGNIFICANT CHANGE UP (ref 81–99)
MONOCYTES # BLD AUTO: 0.7 K/UL — SIGNIFICANT CHANGE UP (ref 0–0.8)
MONOCYTES NFR BLD AUTO: 7.6 % — SIGNIFICANT CHANGE UP (ref 3–10)
NEUTROPHILS # BLD AUTO: 6.1 K/UL — SIGNIFICANT CHANGE UP (ref 1.8–8)
NEUTROPHILS NFR BLD AUTO: 65.7 % — SIGNIFICANT CHANGE UP (ref 37–73)
PCO2 BLDV: 44 MMHG — SIGNIFICANT CHANGE UP (ref 35–50)
PH BLDV: 7.33 — SIGNIFICANT CHANGE UP (ref 7.32–7.43)
PLATELET # BLD AUTO: 290 K/UL — SIGNIFICANT CHANGE UP (ref 150–400)
PO2 BLDV: 32 MMHG — SIGNIFICANT CHANGE UP (ref 25–45)
POTASSIUM BLDV-SCNC: 3.8 MMOL/L — SIGNIFICANT CHANGE UP (ref 3.4–4.5)
POTASSIUM SERPL-MCNC: 3.9 MMOL/L — SIGNIFICANT CHANGE UP (ref 3.5–5.3)
POTASSIUM SERPL-SCNC: 3.9 MMOL/L — SIGNIFICANT CHANGE UP (ref 3.5–5.3)
PROT SERPL-MCNC: 6.6 G/DL — SIGNIFICANT CHANGE UP (ref 6.6–8.7)
PROTHROM AB SERPL-ACNC: 10.9 SEC — SIGNIFICANT CHANGE UP (ref 9.8–12.7)
RBC # BLD: 4.66 M/UL — SIGNIFICANT CHANGE UP (ref 4.4–5.2)
RBC # FLD: 13.3 % — SIGNIFICANT CHANGE UP (ref 11–15.6)
SAO2 % BLDV: 64 % — SIGNIFICANT CHANGE UP
SODIUM SERPL-SCNC: 141 MMOL/L — SIGNIFICANT CHANGE UP (ref 135–145)
TYPE + AB SCN PNL BLD: SIGNIFICANT CHANGE UP
WBC # BLD: 9.3 K/UL — SIGNIFICANT CHANGE UP (ref 4.8–10.8)
WBC # FLD AUTO: 9.3 K/UL — SIGNIFICANT CHANGE UP (ref 4.8–10.8)

## 2018-10-01 PROCEDURE — 82330 ASSAY OF CALCIUM: CPT

## 2018-10-01 PROCEDURE — 83735 ASSAY OF MAGNESIUM: CPT

## 2018-10-01 PROCEDURE — 82435 ASSAY OF BLOOD CHLORIDE: CPT

## 2018-10-01 PROCEDURE — 84145 PROCALCITONIN (PCT): CPT

## 2018-10-01 PROCEDURE — 99284 EMERGENCY DEPT VISIT MOD MDM: CPT

## 2018-10-01 PROCEDURE — 99285 EMERGENCY DEPT VISIT HI MDM: CPT | Mod: 25

## 2018-10-01 PROCEDURE — 85014 HEMATOCRIT: CPT

## 2018-10-01 PROCEDURE — 85027 COMPLETE CBC AUTOMATED: CPT

## 2018-10-01 PROCEDURE — 70450 CT HEAD/BRAIN W/O DYE: CPT | Mod: 26

## 2018-10-01 PROCEDURE — 71260 CT THORAX DX C+: CPT

## 2018-10-01 PROCEDURE — 71045 X-RAY EXAM CHEST 1 VIEW: CPT | Mod: 26

## 2018-10-01 PROCEDURE — 36415 COLL VENOUS BLD VENIPUNCTURE: CPT

## 2018-10-01 PROCEDURE — 85730 THROMBOPLASTIN TIME PARTIAL: CPT

## 2018-10-01 PROCEDURE — 71260 CT THORAX DX C+: CPT | Mod: 26

## 2018-10-01 PROCEDURE — 82803 BLOOD GASES ANY COMBINATION: CPT

## 2018-10-01 PROCEDURE — 80053 COMPREHEN METABOLIC PANEL: CPT

## 2018-10-01 PROCEDURE — 82947 ASSAY GLUCOSE BLOOD QUANT: CPT

## 2018-10-01 PROCEDURE — 84100 ASSAY OF PHOSPHORUS: CPT

## 2018-10-01 PROCEDURE — 72125 CT NECK SPINE W/O DYE: CPT | Mod: 26

## 2018-10-01 PROCEDURE — 86900 BLOOD TYPING SEROLOGIC ABO: CPT

## 2018-10-01 PROCEDURE — 85610 PROTHROMBIN TIME: CPT

## 2018-10-01 PROCEDURE — 86850 RBC ANTIBODY SCREEN: CPT

## 2018-10-01 PROCEDURE — 83605 ASSAY OF LACTIC ACID: CPT

## 2018-10-01 PROCEDURE — 70450 CT HEAD/BRAIN W/O DYE: CPT

## 2018-10-01 PROCEDURE — 72125 CT NECK SPINE W/O DYE: CPT

## 2018-10-01 PROCEDURE — 72128 CT CHEST SPINE W/O DYE: CPT | Mod: 26

## 2018-10-01 PROCEDURE — 74177 CT ABD & PELVIS W/CONTRAST: CPT | Mod: 26

## 2018-10-01 PROCEDURE — 86901 BLOOD TYPING SEROLOGIC RH(D): CPT

## 2018-10-01 PROCEDURE — 72131 CT LUMBAR SPINE W/O DYE: CPT | Mod: 26

## 2018-10-01 PROCEDURE — 74177 CT ABD & PELVIS W/CONTRAST: CPT

## 2018-10-01 PROCEDURE — 84132 ASSAY OF SERUM POTASSIUM: CPT

## 2018-10-01 PROCEDURE — 71045 X-RAY EXAM CHEST 1 VIEW: CPT

## 2018-10-01 PROCEDURE — 84295 ASSAY OF SERUM SODIUM: CPT

## 2018-10-01 RX ORDER — LEVETIRACETAM 250 MG/1
1000 TABLET, FILM COATED ORAL ONCE
Qty: 0 | Refills: 0 | Status: COMPLETED | OUTPATIENT
Start: 2018-10-01 | End: 2018-10-01

## 2018-10-01 RX ADMIN — LEVETIRACETAM 1000 MILLIGRAM(S): 250 TABLET, FILM COATED ORAL at 22:35

## 2018-10-01 NOTE — ED PROVIDER NOTE - OBJECTIVE STATEMENT
37 y/o female with extensive neurological disease, asthma and R drop foot presents to the ED s/p fall that onset today. CODE TRAUMA CALLED According to EMS, family found pt to be at the bottom of the stairs. Pt does not recall event, the last thing she remembers is feeding a cat. Hpi. and .ROS limited 2ndary to pt's critical condition. 37 y/o female with extensive neurological disease, asthma and R drop foot presents to the ED s/p fall that onset today. CODE TRAUMA CALLED. According to EMS, family found pt to be at the bottom of the stairs. Pt does not recall event, the last thing she remembers is feeding her cat. HPI and .ROS limited 2ndary to pt's critical condition.

## 2018-10-01 NOTE — ED PROVIDER NOTE - PROGRESS NOTE DETAILS
Pt understands and recalls risks of leaving against medical advice, including death from worsening neurologic disease. Pt states that pt will go to [place] tomorrow and follow-up with her scheduled neurologist appointment. Pt advised to return to the ED if pt feels worse.

## 2018-10-01 NOTE — H&P ADULT - ASSESSMENT
38F with extensive neurosurgical hx and recent hx of seizures was found down by her steps likely due to another seizure episode. CT head/neck/spine was negative for acute injuries.     -q4hr neurochecks  -f/u neurology consult (Dr. Mathew over the phone on initial consult recommended starting patient on 1000mg Keppra tonight with more recommendations to follow tomorrow)  -place neurosurgery consult (patient's neurosurgeons are: Dr. Diego and Dr. Murray)  -IVFs  -f/u lactate

## 2018-10-01 NOTE — H&P ADULT - PMH
Chiari syndrome  Arnold Chiari syndrome  Su-Danlos syndrome    Seizure disorder    Spinal surgery in prior 3 months

## 2018-10-01 NOTE — ED ADULT TRIAGE NOTE - CHIEF COMPLAINT QUOTE
EMS trauma activation for fall down flight of stairs with neurological deficit.  Hx of cervical spine surgery.

## 2018-10-01 NOTE — H&P ADULT - HISTORY OF PRESENT ILLNESS
Patient is a 38F w/recent hx of seizures who presents via EMS after being found down by her steps, the last thing patient recalls was feeding her cat. Patient exhibited seizure like activity en route and upon initial arrival, however very soon after arrival patient's seizure like activity stopped and became GCS 15. Patient endorsed numbness in R foot and tenderness in cervical spine.     A: Protected, patient conversating  B: CTAB. Symmetrical chest rise  C: 2+ central (femoral) & peripheral pulses (Radial, DP)  D: GCS 15, MAEO, interacting. No zuair disability noted  E: No gross deformities on primary exposure    CXR: Negative for evidence of hemo/pneumothorax

## 2018-10-01 NOTE — H&P ADULT - NSHPPHYSICALEXAM_GEN_ALL_CORE
Constitutional: Well-developed well nourished Female in no acute distress  HEENT: Head is normocephalic and atraumatic, maxillofacial structures stable, no blood or discharge from nares or oral cavity, no stephens sign / racoon eyes, EOMI b/l, pupils 3mm round and reactive to light b/l, no active drainage or redness  Neck: cervical collar in place, trachea midline  Respiratory: Breath sounds CTA b/l respirations are unlabored, no accessory muscle use, no conversational dyspnea  Cardiovascular: Regular rate & rhythm, +S1, S1, Chest wall is non-tender to palpation, no subQ emphysema or crepitus palpated  Gastrointestinal: Abdomen soft, non-tender, non-distended, no rebound tenderness / guarding, no ecchymosis or external signs of abdominal trauma  Musculoskeletal: moving all extremities spontaneously  Vascular: 2+ radial, femoral, and DP pulses b/l  Neurological: GCS: 15 (4/5/6). A&O x 3; right foot numbness  Neurospinal: Cervical and lumbar spine tenderness to palpation, no step-offs or signs of external trauma to the back, PIEDAD with mild decrease in tone

## 2018-10-01 NOTE — ED PROVIDER NOTE - CRANIAL NERVE AND PUPILLARY EXAM
drop foot on R. NYA DP pulses. no sensation below midshin to R drop foot on R. NYA DP pulses intact. no sensation below midshin to R

## 2018-10-02 ENCOUNTER — APPOINTMENT (OUTPATIENT)
Dept: RADIOLOGY | Facility: HOSPITAL | Age: 38
End: 2018-10-02
Payer: MEDICARE

## 2018-10-02 ENCOUNTER — OUTPATIENT (OUTPATIENT)
Dept: OUTPATIENT SERVICES | Facility: HOSPITAL | Age: 38
LOS: 1 days | End: 2018-10-02
Payer: MEDICARE

## 2018-10-02 DIAGNOSIS — Z98.890 OTHER SPECIFIED POSTPROCEDURAL STATES: Chronic | ICD-10-CM

## 2018-10-02 DIAGNOSIS — Z98.89 OTHER SPECIFIED POSTPROCEDURAL STATES: Chronic | ICD-10-CM

## 2018-10-02 DIAGNOSIS — M21.371 FOOT DROP, RIGHT FOOT: ICD-10-CM

## 2018-10-02 DIAGNOSIS — Z98.890 OTHER SPECIFIED POSTPROCEDURAL STATES: ICD-10-CM

## 2018-10-02 DIAGNOSIS — Q06.8 OTHER SPECIFIED CONGENITAL MALFORMATIONS OF SPINAL CORD: ICD-10-CM

## 2018-10-02 PROCEDURE — 72129 CT CHEST SPINE W/DYE: CPT | Mod: 26

## 2018-10-02 PROCEDURE — 72132 CT LUMBAR SPINE W/DYE: CPT | Mod: 26

## 2018-10-02 PROCEDURE — 62305 MYELOGRAPHY LUMBAR INJECTION: CPT

## 2018-10-02 PROCEDURE — 72126 CT NECK SPINE W/DYE: CPT | Mod: 26

## 2018-10-02 PROCEDURE — 72126 CT NECK SPINE W/DYE: CPT

## 2018-10-02 PROCEDURE — 72129 CT CHEST SPINE W/DYE: CPT

## 2018-10-02 PROCEDURE — 72132 CT LUMBAR SPINE W/DYE: CPT

## 2018-10-05 ENCOUNTER — EMERGENCY (EMERGENCY)
Facility: HOSPITAL | Age: 38
LOS: 1 days | Discharge: HOME | End: 2018-10-05
Attending: EMERGENCY MEDICINE | Admitting: PSYCHIATRY & NEUROLOGY
Payer: MEDICARE

## 2018-10-05 VITALS
TEMPERATURE: 98 F | DIASTOLIC BLOOD PRESSURE: 84 MMHG | SYSTOLIC BLOOD PRESSURE: 136 MMHG | RESPIRATION RATE: 18 BRPM | OXYGEN SATURATION: 100 % | WEIGHT: 166.89 LBS | HEIGHT: 63 IN | HEART RATE: 101 BPM

## 2018-10-05 DIAGNOSIS — Z98.890 OTHER SPECIFIED POSTPROCEDURAL STATES: Chronic | ICD-10-CM

## 2018-10-05 DIAGNOSIS — Z98.89 OTHER SPECIFIED POSTPROCEDURAL STATES: Chronic | ICD-10-CM

## 2018-10-05 LAB
ALBUMIN SERPL ELPH-MCNC: 4.5 G/DL — SIGNIFICANT CHANGE UP (ref 3.3–5)
ALP SERPL-CCNC: 88 U/L — SIGNIFICANT CHANGE UP (ref 40–120)
ALT FLD-CCNC: 12 U/L — SIGNIFICANT CHANGE UP (ref 10–45)
ANION GAP SERPL CALC-SCNC: 10 MMOL/L — SIGNIFICANT CHANGE UP (ref 5–17)
APTT BLD: 35.9 SEC — SIGNIFICANT CHANGE UP (ref 27.5–37.4)
AST SERPL-CCNC: 8 U/L — LOW (ref 10–40)
BASOPHILS # BLD AUTO: 0.1 K/UL — SIGNIFICANT CHANGE UP (ref 0–0.2)
BASOPHILS NFR BLD AUTO: 0.9 % — SIGNIFICANT CHANGE UP (ref 0–2)
BILIRUB SERPL-MCNC: 0.3 MG/DL — SIGNIFICANT CHANGE UP (ref 0.2–1.2)
BUN SERPL-MCNC: 4 MG/DL — LOW (ref 7–23)
CALCIUM SERPL-MCNC: 9.4 MG/DL — SIGNIFICANT CHANGE UP (ref 8.4–10.5)
CHLORIDE SERPL-SCNC: 109 MMOL/L — HIGH (ref 96–108)
CO2 SERPL-SCNC: 23 MMOL/L — SIGNIFICANT CHANGE UP (ref 22–31)
CREAT SERPL-MCNC: 0.72 MG/DL — SIGNIFICANT CHANGE UP (ref 0.5–1.3)
EOSINOPHIL # BLD AUTO: 0.1 K/UL — SIGNIFICANT CHANGE UP (ref 0–0.5)
EOSINOPHIL NFR BLD AUTO: 1.3 % — SIGNIFICANT CHANGE UP (ref 0–6)
GLUCOSE SERPL-MCNC: 91 MG/DL — SIGNIFICANT CHANGE UP (ref 70–99)
HCG SERPL-ACNC: <2 MIU/ML — SIGNIFICANT CHANGE UP
HCT VFR BLD CALC: 45.7 % — HIGH (ref 34.5–45)
HGB BLD-MCNC: 15 G/DL — SIGNIFICANT CHANGE UP (ref 11.5–15.5)
INR BLD: 1.05 RATIO — SIGNIFICANT CHANGE UP (ref 0.88–1.16)
LYMPHOCYTES # BLD AUTO: 2.1 K/UL — SIGNIFICANT CHANGE UP (ref 1–3.3)
LYMPHOCYTES # BLD AUTO: 23.8 % — SIGNIFICANT CHANGE UP (ref 13–44)
MCHC RBC-ENTMCNC: 30 PG — SIGNIFICANT CHANGE UP (ref 27–34)
MCHC RBC-ENTMCNC: 32.9 GM/DL — SIGNIFICANT CHANGE UP (ref 32–36)
MCV RBC AUTO: 91.4 FL — SIGNIFICANT CHANGE UP (ref 80–100)
MONOCYTES # BLD AUTO: 0.5 K/UL — SIGNIFICANT CHANGE UP (ref 0–0.9)
MONOCYTES NFR BLD AUTO: 5.6 % — SIGNIFICANT CHANGE UP (ref 2–14)
NEUTROPHILS # BLD AUTO: 6.1 K/UL — SIGNIFICANT CHANGE UP (ref 1.8–7.4)
NEUTROPHILS NFR BLD AUTO: 68.4 % — SIGNIFICANT CHANGE UP (ref 43–77)
PLATELET # BLD AUTO: 324 K/UL — SIGNIFICANT CHANGE UP (ref 150–400)
POTASSIUM SERPL-MCNC: 3.2 MMOL/L — LOW (ref 3.5–5.3)
POTASSIUM SERPL-SCNC: 3.2 MMOL/L — LOW (ref 3.5–5.3)
PROT SERPL-MCNC: 7.2 G/DL — SIGNIFICANT CHANGE UP (ref 6–8.3)
PROTHROM AB SERPL-ACNC: 11.3 SEC — SIGNIFICANT CHANGE UP (ref 9.8–12.7)
RBC # BLD: 5 M/UL — SIGNIFICANT CHANGE UP (ref 3.8–5.2)
RBC # FLD: 12 % — SIGNIFICANT CHANGE UP (ref 10.3–14.5)
SODIUM SERPL-SCNC: 142 MMOL/L — SIGNIFICANT CHANGE UP (ref 135–145)
WBC # BLD: 8.9 K/UL — SIGNIFICANT CHANGE UP (ref 3.8–10.5)
WBC # FLD AUTO: 8.9 K/UL — SIGNIFICANT CHANGE UP (ref 3.8–10.5)

## 2018-10-05 RX ORDER — ONDANSETRON 8 MG/1
4 TABLET, FILM COATED ORAL ONCE
Qty: 0 | Refills: 0 | Status: COMPLETED | OUTPATIENT
Start: 2018-10-05 | End: 2018-10-05

## 2018-10-05 RX ORDER — ACETAMINOPHEN 500 MG
975 TABLET ORAL ONCE
Qty: 0 | Refills: 0 | Status: DISCONTINUED | OUTPATIENT
Start: 2018-10-05 | End: 2018-10-05

## 2018-10-05 RX ORDER — METOCLOPRAMIDE HCL 10 MG
10 TABLET ORAL ONCE
Qty: 0 | Refills: 0 | Status: COMPLETED | OUTPATIENT
Start: 2018-10-05 | End: 2018-10-05

## 2018-10-05 RX ORDER — ACETAMINOPHEN 500 MG
325 TABLET ORAL ONCE
Qty: 0 | Refills: 0 | Status: COMPLETED | OUTPATIENT
Start: 2018-10-05 | End: 2018-10-05

## 2018-10-05 RX ORDER — DIPHENHYDRAMINE HCL 50 MG
25 CAPSULE ORAL ONCE
Qty: 0 | Refills: 0 | Status: COMPLETED | OUTPATIENT
Start: 2018-10-05 | End: 2018-10-05

## 2018-10-05 RX ORDER — SODIUM CHLORIDE 9 MG/ML
1000 INJECTION INTRAMUSCULAR; INTRAVENOUS; SUBCUTANEOUS ONCE
Qty: 0 | Refills: 0 | Status: COMPLETED | OUTPATIENT
Start: 2018-10-05 | End: 2018-10-05

## 2018-10-05 RX ADMIN — Medication 325 MILLIGRAM(S): at 23:30

## 2018-10-05 RX ADMIN — Medication 25 MILLIGRAM(S): at 23:37

## 2018-10-05 RX ADMIN — SODIUM CHLORIDE 1000 MILLILITER(S): 9 INJECTION INTRAMUSCULAR; INTRAVENOUS; SUBCUTANEOUS at 23:36

## 2018-10-05 RX ADMIN — Medication 10 MILLIGRAM(S): at 23:37

## 2018-10-05 RX ADMIN — ONDANSETRON 4 MILLIGRAM(S): 8 TABLET, FILM COATED ORAL at 23:37

## 2018-10-05 NOTE — ED PROVIDER NOTE - PROGRESS NOTE DETAILS
Tabitha Crandall, DO: discussed with anesthesia - will need clearance from neuro. Anesthesia to see in AM for blood patch if no clinical improvement. CDU bed reserved. Spectra # [id number] to be called between 7A-8A to reserve. to be plaed in osb for pain control, blood patch in MA CDU now full, will admit.

## 2018-10-05 NOTE — ED PROVIDER NOTE - PMH
Cervical disc disease  h/o cervical fusion  Chiari I malformation  diagnsoed in 2016; decompression Feb 2018  Chiari syndrome  Arnold Chiari syndrome  Craniovertebral instability    EDS (Su-Danlos syndrome)    Su-Danlos syndrome    History of asthma  without exacerbations  Irritable bowel syndrome without diarrhea  with constipation  Low back pain  chronic 2012  Seizure  onset 5/23/18, ER visit to [facility] . Was discharged on no anti seizure medications .  Seizure disorder    Sinus tachycardia  on Metoprolol ( controlled )  Spinal surgery in prior 3 months    Syncope  episodes when flexes her neck forward  Tethered cord syndrome

## 2018-10-05 NOTE — ED PROVIDER NOTE - OBJECTIVE STATEMENT
Tabitha Priscillalashanda, DO:   38F with hx of Chiari I malformation s/p fossa decompression x 3, EDS, asthma, IBS, seizures on Topamax, syncope and tethered cord syndrome here for HA x 3 days & seizures x 3 this AM. Last seizure Monday, usually infrequent. On Topamax for seizures though no o/p f/u. Unknown type of seizures. Diffuse frontal HA since CT myelogram for foot drop. No fevers, recent travel. Headache better with lying flat. No relief with analgesia associated with nausea & NBNB emesis. Seen at [facility] Monday for seizure - per chart review patient had exhibited seizure like activity en route and upon initial arrival, however very soon after arrival patient's seizure like activity stopped and became GCS 15. Patient recommended for admission but left AMA because of planned myelogram. LMP Sept 27th.     PMD: Dr. Villegas, Johny  Neurosurgeon: Dr. Rosales

## 2018-10-05 NOTE — ED PROVIDER NOTE - PHYSICAL EXAMINATION
Tabitha Crandall, DO:   Gen: Well appearing, NAD  Head: NCAT  HEENT: PERRL, MMM, normal conjunctiva, anicteric, neck supple  Lung: CTAB, no adventitious sounds  CV: RRR, no murmurs  Abd: soft, NTND, no rebound or guarding, no CVAT  MSK: No edema, no visible deformities  Neuro: CN II-XII intact. 5/5 strength and normal sensation in all extremities. Moving all extremities equally. No dysmetria. No diadochokinesia. No pronator drift.   Skin: Warm and dry, no evidence of rash  Psych: normal mood and affect

## 2018-10-05 NOTE — ED PROVIDER NOTE - ATTENDING CONTRIBUTION TO CARE
ATTENDING MD:  Robert ZULETA, personally have seen and examined this patient.  I have discussed all aspects of care with the resident physician. Resident note reviewed and agree on plan of care and except where noted.  See HPI, PE, and MDM for details.     VITALS: reviewed  GEN: NAD, A & O x 4  HEAD/EYES: NCAT, PERRL, EOMI, anicteric sclerae, no conjunctival pallor  ENT: mucus membranes moist, oropharynx WNL, trachea midline, no JVD  RESP: lungs CTA with equal breath sounds bilaterally, chest wall nontender and atraumatic  CV: heart with reg rhythm S1, S2, no murmur; distal pulses intact and symmetric bilaterally  ABDOMEN: normoactive bowel sounds, soft, nondistended, nontender, no palpable masses  : no CVAT  MSK: extremities atraumatic and nontender, no edema, no asymmetry. the back is without midline or lateral tenderness, there is no spinal deformity or stepoff and the back is ranged painlessly. the neck has no midline tenderness, deformity, or stepoff, and is ranged painlessly.  SKIN: warm, dry, no rash, no bruising, no cyanosis. color appropriate for ethnicity  NEURO: Normal mentation, GCS15, CNII-XII are intact, strength is 5/5 throughout upper and lower extremities symmetric bilaterally, Sensation is intact to light touch throughout trunk and extremities symmetric bilaterally, Cerebellar function is intact by finger-nose-finger  PSYCH: Affect appropriate     MDM: low pressure headache. pain meds, fluids, caffeine, consult anesthesia for blood patch

## 2018-10-05 NOTE — ED ADULT TRIAGE NOTE - CHIEF COMPLAINT QUOTE
headache, nausea - since Tuesday  also reports 3 seizures today - hx of seizures - last seizure Monday, prior to that 1 month ago  2 months post-op brain surgery for Chiari malformation

## 2018-10-06 DIAGNOSIS — G97.1 OTHER REACTION TO SPINAL AND LUMBAR PUNCTURE: ICD-10-CM

## 2018-10-06 PROBLEM — G40.909 EPILEPSY, UNSPECIFIED, NOT INTRACTABLE, WITHOUT STATUS EPILEPTICUS: Chronic | Status: ACTIVE | Noted: 2018-10-01

## 2018-10-06 RX ORDER — POTASSIUM CHLORIDE 20 MEQ
40 PACKET (EA) ORAL ONCE
Qty: 0 | Refills: 0 | Status: COMPLETED | OUTPATIENT
Start: 2018-10-06 | End: 2018-10-06

## 2018-10-06 RX ORDER — ACETAMINOPHEN 500 MG
650 TABLET ORAL ONCE
Qty: 0 | Refills: 0 | Status: COMPLETED | OUTPATIENT
Start: 2018-10-06 | End: 2018-10-06

## 2018-10-06 RX ORDER — LEVETIRACETAM 250 MG/1
1000 TABLET, FILM COATED ORAL ONCE
Qty: 0 | Refills: 0 | Status: COMPLETED | OUTPATIENT
Start: 2018-10-06 | End: 2018-10-06

## 2018-10-06 RX ORDER — ENOXAPARIN SODIUM 100 MG/ML
40 INJECTION SUBCUTANEOUS EVERY 24 HOURS
Qty: 0 | Refills: 0 | Status: DISCONTINUED | OUTPATIENT
Start: 2018-10-06 | End: 2018-10-07

## 2018-10-06 RX ORDER — LEVETIRACETAM 250 MG/1
500 TABLET, FILM COATED ORAL EVERY 12 HOURS
Qty: 0 | Refills: 0 | Status: DISCONTINUED | OUTPATIENT
Start: 2018-10-06 | End: 2018-10-07

## 2018-10-06 RX ADMIN — Medication 650 MILLIGRAM(S): at 20:26

## 2018-10-06 RX ADMIN — Medication 40 MILLIEQUIVALENT(S): at 02:44

## 2018-10-06 RX ADMIN — Medication 1 TABLET(S): at 13:15

## 2018-10-06 RX ADMIN — LEVETIRACETAM 400 MILLIGRAM(S): 250 TABLET, FILM COATED ORAL at 06:22

## 2018-10-06 RX ADMIN — Medication 1 TABLET(S): at 01:35

## 2018-10-06 RX ADMIN — ENOXAPARIN SODIUM 40 MILLIGRAM(S): 100 INJECTION SUBCUTANEOUS at 06:22

## 2018-10-06 RX ADMIN — LEVETIRACETAM 1000 MILLIGRAM(S): 250 TABLET, FILM COATED ORAL at 02:44

## 2018-10-06 RX ADMIN — Medication 1 TABLET(S): at 12:49

## 2018-10-06 RX ADMIN — Medication 650 MILLIGRAM(S): at 20:56

## 2018-10-06 RX ADMIN — LEVETIRACETAM 400 MILLIGRAM(S): 250 TABLET, FILM COATED ORAL at 18:20

## 2018-10-06 NOTE — H&P ADULT - HISTORY OF PRESENT ILLNESS
37yo C woman with a PMHx of right foot drop d/t lumbar pathology, Chiari I malformation s/p fossa decompression x 3, EDS, asthma, IBS, seizures on Topamax, syncope and tethered cord syndrome here for HA x 3 days & seizures x 3 this AM. Last seizure Monday, usually infrequent. On Topamax for headaches, increased for  seizures though no o/p f/u. Patient states  reports GTC type seizures. Diffuse frontal HA since CT myelogram for foot drop. No fevers, recent travel. Headache better with lying flat.   Patient states that she has had 3 seizures today.  Aura present poorly defined. Post-ictal confusion for several minutes.   Patient has no other complaints. Patient denies fevers, chills, ns, cp, sob, abd pain, n/v/d/c, weakness, or changes to weight or senses.

## 2018-10-06 NOTE — H&P ADULT - ATTENDING COMMENTS
This is a 35yo female w/h/o right foot drop d/t lumbar pathology, Chiari I malformation s/p fossa decompression x 3, EDS, asthma, IBS, tethered cord syndrome who presented with cluster of convulsions. Pt was initially on TPM 100mg BID for headache ppx. First convulsion around May-July of this year. She would feel foggy -> LOC -> full body convulsion x few min. TPM was thus increased to 150mg BID. Despite this increase, pt continued to have convulsions. This past week, she had clusters of convulsions Monday and Friday. Per the neurology admitting team, pt was loaded with LEV 1gm and started on 500mg BID. Although pt has anxiety disorder, she doesn't feel LEV has worsened her mood or anxiety.    Impression:  Unclear at this point if pt has epilepsy vs PNES.     Plan:  - cvEEG to characterize convulsion  - cont LEV 500mg BID for now, consider tapering tomorrow  - sz precaution

## 2018-10-06 NOTE — H&P ADULT - NSHPLABSRESULTS_GEN_ALL_CORE
LABS:                        15.0   8.9   )-----------( 324      ( 05 Oct 2018 23:12 )             45.7     05 Oct 2018 23:12    142    |  109    |  4      ----------------------------<  91     3.2     |  23     |  0.72     Ca    9.4        05 Oct 2018 23:12    TPro  7.2    /  Alb  4.5    /  TBili  0.3    /  DBili  x      /  AST  8      /  ALT  12     /  AlkPhos  88     05 Oct 2018 23:12    PT/INR - ( 05 Oct 2018 23:12 )   PT: 11.3 sec;   INR: 1.05 ratio         PTT - ( 05 Oct 2018 23:12 )  PTT:35.9 sec

## 2018-10-06 NOTE — H&P ADULT - ASSESSMENT
Impression:  39yo C woman with a PMHx of right foot drop d/t lumbar pathology, Chiari I malformation s/p fossa decompression x 3, EDS, asthma, IBS, seizures on Topamax, syncope and tethered cord syndrome here for HA x 3 days & seizures x 3 this AM. Last seizure Monday, usually infrequent. On Topamax for headaches, increased for  seizures though no o/p f/u. Patient states  reports GTC type seizures. Diffuse frontal HA since CT myelogram for foot drop. No fevers, recent travel. Headache better with lying flat.   Patient states that she has had 3 seizures today.  Aura present poorly defined. Post-ictal confusion for several minutes.   Patient has no other complaints. Patient denies fevers, chills, ns, cp, sob, abd pain, n/v/d/c, weakness, or changes to weight or senses.     Seizure like activity    Plan:  - Keppra load 1000mg  - Keppra 500mg BID  - 24hr vEEG Impression:  39yo C woman with a PMHx of right foot drop d/t lumbar pathology, Chiari I malformation s/p fossa decompression x 3, EDS, asthma, IBS, seizures on Topamax, syncope and tethered cord syndrome here for HA x 3 days & seizures x 3 this AM. Last seizure Monday, usually infrequent. On Topamax for headaches, increased for  seizures though no o/p f/u. Patient states  reports GTC type seizures. Diffuse frontal HA since CT myelogram for foot drop. No fevers, recent travel. Headache better with lying flat.   Patient states that she has had 3 seizures today.  Aura present poorly defined. Post-ictal confusion for several minutes.   Patient has no other complaints. Patient denies fevers, chills, ns, cp, sob, abd pain, n/v/d/c, weakness, or changes to weight or senses.     Seizure like activity    Plan:  - Keppra load 1000mg  - Keppra 500mg BID  - Ativan 2mg IV PRN seizure activity lasting >3-5 mins, >2/hr, >3/day.   - 24hr vEEG

## 2018-10-06 NOTE — H&P ADULT - NSHPPHYSICALEXAM_GEN_ALL_CORE
Constitutional: awake and alert.  HEENT: PERRLA, EOMI,   Neck: Supple.    Neurological exam:  HF: A x O x 3. Appropriately interactive, normal affect. Speech fluent, No Aphasia or paraphasic errors. Naming /repetition intact   CN: MARIBEL, EOMI, VFF, facial sensation normal, no NLFD, tongue midline, Palate moves equally, SCM equal bilaterally  Motor: No pronator drift, Strength 5/5 in all 4 ext, normal bulk and tone, no tremor, rigidity or bradykinesia.    Sens: Intact to light touch / PP/ VS/ JS    Reflexes: Symmetric and normal . BJ 2+, BR 2+, KJ 2+, AJ 2+, downgoing toes b/l  Coord:  No FNFA, dysmetria, RADHA intact   Gait/Balance: Normal

## 2018-10-07 ENCOUNTER — TRANSCRIPTION ENCOUNTER (OUTPATIENT)
Age: 38
End: 2018-10-07

## 2018-10-07 VITALS
TEMPERATURE: 98 F | OXYGEN SATURATION: 99 % | HEART RATE: 74 BPM | RESPIRATION RATE: 18 BRPM | SYSTOLIC BLOOD PRESSURE: 132 MMHG | DIASTOLIC BLOOD PRESSURE: 80 MMHG

## 2018-10-07 LAB
APPEARANCE UR: ABNORMAL
BACTERIA # UR AUTO: NEGATIVE — SIGNIFICANT CHANGE UP
BILIRUB UR-MCNC: NEGATIVE — SIGNIFICANT CHANGE UP
COLOR SPEC: YELLOW — SIGNIFICANT CHANGE UP
DIFF PNL FLD: NEGATIVE — SIGNIFICANT CHANGE UP
EPI CELLS # UR: 3 /HPF — SIGNIFICANT CHANGE UP (ref 0–5)
GLUCOSE UR QL: NEGATIVE MG/DL — SIGNIFICANT CHANGE UP
HCG UR QL: NEGATIVE — SIGNIFICANT CHANGE UP
HYALINE CASTS # UR AUTO: 0 /LPF — SIGNIFICANT CHANGE UP (ref 0–7)
KETONES UR-MCNC: NEGATIVE — SIGNIFICANT CHANGE UP
LEUKOCYTE ESTERASE UR-ACNC: NEGATIVE — SIGNIFICANT CHANGE UP
NITRITE UR-MCNC: NEGATIVE — SIGNIFICANT CHANGE UP
PH UR: 8.5 — HIGH (ref 5–8)
PROT UR-MCNC: NEGATIVE MG/DL — SIGNIFICANT CHANGE UP
RBC CASTS # UR COMP ASSIST: 6 /HPF — HIGH (ref 0–4)
SP GR SPEC: 1.01 — SIGNIFICANT CHANGE UP (ref 1.01–1.02)
UROBILINOGEN FLD QL: NEGATIVE MG/DL — SIGNIFICANT CHANGE UP
WBC UR QL: 1 /HPF — SIGNIFICANT CHANGE UP (ref 0–5)

## 2018-10-07 PROCEDURE — 80053 COMPREHEN METABOLIC PANEL: CPT

## 2018-10-07 PROCEDURE — 84702 CHORIONIC GONADOTROPIN TEST: CPT

## 2018-10-07 PROCEDURE — 85730 THROMBOPLASTIN TIME PARTIAL: CPT

## 2018-10-07 PROCEDURE — 85027 COMPLETE CBC AUTOMATED: CPT

## 2018-10-07 PROCEDURE — 81001 URINALYSIS AUTO W/SCOPE: CPT

## 2018-10-07 PROCEDURE — 70450 CT HEAD/BRAIN W/O DYE: CPT

## 2018-10-07 PROCEDURE — 85610 PROTHROMBIN TIME: CPT

## 2018-10-07 PROCEDURE — 81025 URINE PREGNANCY TEST: CPT

## 2018-10-07 RX ORDER — LEVETIRACETAM 250 MG/1
1 TABLET, FILM COATED ORAL
Qty: 60 | Refills: 3
Start: 2018-10-07 | End: 2019-02-03

## 2018-10-07 RX ORDER — POTASSIUM CHLORIDE 20 MEQ
40 PACKET (EA) ORAL ONCE
Qty: 0 | Refills: 0 | Status: DISCONTINUED | OUTPATIENT
Start: 2018-10-07 | End: 2018-10-07

## 2018-10-07 RX ORDER — LEVETIRACETAM 250 MG/1
1 TABLET, FILM COATED ORAL
Qty: 0 | Refills: 3 | DISCHARGE
Start: 2018-10-07 | End: 2019-02-03

## 2018-10-07 RX ADMIN — LEVETIRACETAM 400 MILLIGRAM(S): 250 TABLET, FILM COATED ORAL at 05:33

## 2018-10-07 RX ADMIN — LEVETIRACETAM 400 MILLIGRAM(S): 250 TABLET, FILM COATED ORAL at 17:46

## 2018-10-07 RX ADMIN — ENOXAPARIN SODIUM 40 MILLIGRAM(S): 100 INJECTION SUBCUTANEOUS at 05:32

## 2018-10-07 RX ADMIN — Medication 1 TABLET(S): at 14:25

## 2018-10-07 NOTE — DISCHARGE NOTE ADULT - CARE PLAN
Principal Discharge DX:	Seizure disorder  Goal:	Prevent recurrence  Assessment and plan of treatment:	Continue Keppra 500mg BID  Follow-up with Neurology Clinic

## 2018-10-07 NOTE — DISCHARGE NOTE ADULT - MEDICATION SUMMARY - MEDICATIONS TO TAKE
I will START or STAY ON the medications listed below when I get home from the hospital:    HYDROmorphone 4 mg oral tablet  -- 1 tab(s) by mouth every 4 hours, As needed, Severe Pain (7 - 10) MDD:5  -- Indication: For Other reaction to spinal and lumbar puncture    Dilaudid 4 mg oral tablet  -- 1 tab(s) by mouth every 4 hours, As Needed  -- Indication: For Other reaction to spinal and lumbar puncture    Keppra 500 mg oral tablet  -- 1 tab(s) by mouth 2 times a day   -- Check with your doctor before becoming pregnant.  It is very important that you take or use this exactly as directed.  Do not skip doses or discontinue unless directed by your doctor.  May cause drowsiness or dizziness.  Obtain medical advice before taking any non-prescription drugs as some may affect the action of this medication.  Swallow whole.  Do not crush.  This drug may impair the ability to drive or operate machinery.  Use care until you become familiar with its effects.    -- Indication: For Seizure disorder    diazePAM 5 mg oral tablet  -- 1 tab(s) by mouth every 8 hours, As Needed -for muscle spasm MDD:3   -- Indication: For Other reaction to spinal and lumbar puncture    Topamax  -- 150 milligram(s) by mouth 2 times a day  -- Indication: For Headache    Cymbalta 60 mg oral delayed release capsule  -- 1 cap(s) by mouth once a day  -- Indication: For Anxiety    Abilify 5 mg oral tablet  -- 1 tab(s) by mouth once a day  -- Indication: For Anxiety    metoprolol succinate 25 mg oral tablet, extended release  -- 0.5 tab(s) by mouth once a day (at bedtime)  -- Indication: For Anxiety    Pepcid 40 mg oral tablet  -- 1 tab(s) by mouth once a day, As Needed  -- Indication: For Other reaction to spinal and lumbar puncture    docusate sodium 100 mg oral capsule  -- 1 cap(s) by mouth once a day  -- Indication: For Other reaction to spinal and lumbar puncture    cyclobenzaprine 10 mg oral tablet  -- 1 tab(s) by mouth 3 times a day  -- Indication: For Other reaction to spinal and lumbar puncture    tiZANidine 4 mg oral tablet  -- orally once a day  -- Indication: For Lumbar back pain

## 2018-10-07 NOTE — DISCHARGE NOTE ADULT - PATIENT PORTAL LINK FT
You can access the [facility], offered by [facility], by registering with the following website: [email or web address]

## 2018-10-07 NOTE — CONSULT NOTE ADULT - ASSESSMENT
history of POTs  stable clinically  cont to monitor    sz  on keppra  fu with neurology    hypokalemia  replete K

## 2018-10-07 NOTE — PROGRESS NOTE ADULT - PROBLEM SELECTOR PLAN 1
Seizure like activity, r/o PNES     Plan:  -transfer to [address] [place] [address]  -c/w Keppra 500mg BID, discontinue once connected to VEEG  -seizure precautions w/ Ativan 2mg IV PRN seizure activity lasting >3-5 mins, >2/hr, >3/day.   -24hr vEEG

## 2018-10-07 NOTE — CONSULT NOTE ADULT - SUBJECTIVE AND OBJECTIVE BOX
CHIEF COMPLAINT:Patient is a 38y old  Female who presents with a chief complaint of Seizure (06 Oct 2018 06:44)      HISTORY OF PRESENT ILLNESS:  called by patient to evaluate her   she is known to me from prior admission   she is a pleasant 38 female with history as below , autonomic dysfunction , POTS   admitted with sz  she denies any chest pain or sob     PAST MEDICAL & SURGICAL HISTORY:  Spinal surgery in prior 3 months  Seizure disorder  Su-Danlos syndrome  Chiari syndrome: Arnold Chiari syndrome  Sinus tachycardia: on Metoprolol ( controlled )  History of asthma: without exacerbations  Craniovertebral instability  Syncope: episodes when flexes her neck forward  Seizure: onset 18, ER visit to [facility] . Was discharged on no anti seizure medications .  Tethered cord syndrome  EDS (Su-Danlos syndrome)  Chiari I malformation: diagnsoed in ; decompression 2018  Irritable bowel syndrome without diarrhea: with constipation  Cervical disc disease: h/o cervical fusion  Low back pain: chronic   H/O lumbosacral spine surgery  H/O cervical spine surgery  H/O craniotomy: 16 suboccipital crani for C1 arch resection w posterior occipital cervical hardware fusion to C2 , was revised 18.  History of back surgery: 2017--  T12 vertebral column resection, T8-L2 instrumentation and fusion.  S/P myelogram: c/b CSF leak, spinal headache, S/P blood patch  History of tonsillectomy: age 18  History of cholecystectomy: laparoscopic   H/O: :   S/P lumbar laminectomy: , complicated with CSF leak, S/P blood patch          MEDICATIONS:  enoxaparin Injectable 40 milliGRAM(s) SubCutaneous every 24 hours        levETIRAcetam  IVPB 500 milliGRAM(s) IV Intermittent every 12 hours  LORazepam   Injectable 3.8 milliGRAM(s) IV Push once PRN            FAMILY HISTORY:  Family history of coronary artery disease (Sibling): sister has 4 stents dx age 38  Family history of hepatitis C  Family history of drug abuse      Non-contributory    SOCIAL HISTORY:    No tobacco, drugs or etoh    Allergies    Bee Stings (Anaphylaxis)  Keflex (Anaphylaxis)  Keflex (Unknown)  latex (Rash)  latex (Unknown)  penicillins (Anaphylaxis)  TEGADERM (Unknown)    Intolerances    	    REVIEW OF SYSTEMS:  as above  The rest of the 14 points ROS reviewed and except above they are unremarkable.        PHYSICAL EXAM:  T(C): 37.1 (10-07-18 @ 09:52), Max: 37.5 (10-07-18 @ 01:01)  HR: 91 (10-07-18 @ 09:52) (76 - 91)  BP: 111/78 (10-07-18 @ 09:52) (102/73 - 120/85)  RR: 18 (10-07-18 @ 09:52) (18 - 18)  SpO2: 94% (10-07-18 @ 09:52) (94% - 98%)  Wt(kg): --  I&O's Summary    06 Oct 2018 07:  -  07 Oct 2018 07:00  --------------------------------------------------------  IN: 500 mL / OUT: 350 mL / NET: 150 mL    07 Oct 2018 07:  -  07 Oct 2018 10:26  --------------------------------------------------------  IN: 240 mL / OUT: 400 mL / NET: -160 mL        Appearance: Normal	  HEENT:   no gross abnormality   Cardiovascular: Normal S1 S2,    Murmur:   Neck: JVP normal  Respiratory: Lungs clear   Gastrointestinal:  Soft, Non-tender  Skin: normal   Neuro: No gross deficits.   Psychiatry:  Mood & affect flat  Ext: No edema    LABS/DATA:    TELEMETRY: 	    ECG:  	   	  CARDIAC MARKERS:                                      15.0   8.9   )-----------( 324      ( 05 Oct 2018 23:12 )             45.7     10-05    142  |  109<H>  |  4<L>  ----------------------------<  91  3.2<L>   |  23  |  0.72    Ca    9.4      05 Oct 2018 23:12    TPro  7.2  /  Alb  4.5  /  TBili  0.3  /  DBili  x   /  AST  8<L>  /  ALT  12  /  AlkPhos  88  10-05    proBNP:   Lipid Profile:   HgA1c:   TSH:     < from: Transthoracic Echocardiogram (18 @ 09:17) >  ------------------------------------------------------------------------  Conclusions:  1. Increased relative wall thickness with normal left  ventricular mass index, consistent with concentric left  ventricular remodeling.  2. Normal left ventricular systolic function. No segmental  wall motion abnormalities.  *** No previous Echo exam.    < end of copied text >

## 2018-10-07 NOTE — DISCHARGE NOTE ADULT - HOSPITAL COURSE
37yo female w/h/o right foot drop d/t lumbar pathology, Chiari I malformation s/p fossa decompression x 3, EDS, asthma, IBS, tethered cord syndrome who presented with cluster of convulsions. Pt was initially on TPM 100mg BID for headache ppx. First convulsion around May-July of this year. She would feel foggy -> LOC -> full body convulsion x few min. TPM was thus increased to 150mg BID. Despite this increase, pt continued to have convulsions. This past week, she had clusters of convulsions Monday and Friday. Per the neurology admitting team, pt was loaded with LEV 1gm and started on 500mg BID. Although pt has anxiety disorder, she doesn't feel LEV has worsened her mood or anxiety. Patient admitted to EMU for monitoring and further medication adjustment. Neurosurgery recommended Anesthesia consult for blood patch to treat post-LP headache. However, patient stated she had a family emergency and requested to be discharged. Advised patient to continue Keppra 500mg BID on discharge.  Patient was advised to follow-up with Neurosurgery outpatient and Neurology outpatient. 35yo female w/h/o right foot drop d/t lumbar pathology, Chiari I malformation s/p fossa decompression x 3, EDS, asthma, IBS, tethered cord syndrome who presented with cluster of convulsions. Pt was initially on TPM 100mg BID for headache ppx. First convulsion around May-July of this year. She would feel foggy -> LOC -> full body convulsion x few min. TPM was thus increased to 150mg BID. Despite this increase, pt continued to have convulsions. This past week, she had clusters of convulsions Monday and Friday. Per the neurology admitting team, pt was loaded with LEV 1gm and started on 500mg BID. Although pt has anxiety disorder, she doesn't feel LEV has worsened her mood or anxiety. Patient admitted to EMU for spell characterization. Neurosurgery recommended Anesthesia consult for blood patch to treat post-LP headache. However, patient stated she had a family emergency and requested to be discharged. Advised patient to continue Keppra 500mg BID on discharge. Plan to admit pt to EMU in the future for spell characterization. Patient was advised to follow-up with Neurosurgery outpatient and Neurology outpatient.

## 2018-10-07 NOTE — PROGRESS NOTE ADULT - PROBLEM SELECTOR PLAN 2
2/2 post-LP  Plan:   -neuro checks  -pain control  -patient requested neurosurgery consult for possible blood patch.

## 2018-10-07 NOTE — PROGRESS NOTE ADULT - SUBJECTIVE AND OBJECTIVE BOX
INTERVAL HISTORY:  No event overnight. Recently had myelogram; reports post-myelogram HA.    MEDICATIONS:   enoxaparin Injectable 40 milliGRAM(s) SubCutaneous every 24 hours  levETIRAcetam  IVPB 500 milliGRAM(s) IV Intermittent every 12 hours  LORazepam   Injectable 3.8 milliGRAM(s) IV Push once PRN Seizure activity    ALLERGIES:   Bee Stings (Anaphylaxis)  Keflex (Anaphylaxis)  Keflex (Unknown)  latex (Rash)  latex (Unknown)  penicillins (Anaphylaxis)  TEGADERM (Unknown)    ROS:  Neurology as per HPI, otherwise negative for constitutional, skin, eyes, ENT, respiratory, cardiovascular, gastrointestinal, genitourinary, musculoskeletal, psychiatric, hematology/lymphatics, endocrine, allergic/immunologic.    LAST 24-HR VITALS:  T(C): 37.2 (10-07-18 @ 14:04), Max: 37.5 (10-07-18 @ 01:01)  HR: 93 (10-07-18 @ 14:04) (76 - 93)  BP: 117/84 (10-07-18 @ 14:04) (102/73 - 117/84)  ABP: --  RR: 18 (10-07-18 @ 14:04) (18 - 18)  SpO2: 96% (10-07-18 @ 14:04) (94% - 98%)  CVP(cm H2O): --    GENERAL PHYSICAL EXAM:  GEN: well appearing, well nourished, no distress, normal affect, cooperative   HEENT:  NCAT, OP clear  EYES: sclera white, conjunctiva clear, no nystagmus  NECK: supple  Lymphatic: No lymphadenopathy  CV: RRR, no murmur     		  PULM: CTAB, no wheezing  GI: soft ABD, +BS, NT  EXT: peripheral pulse intact, no edema, no clubbing, no cyanosis  MSK: muscle tone and strength normal  SKIN: warm, dry, no rash or lesion on exposed skin     NEUROLOGICAL EXAM:  Mental Status  Orientation: alert and oriented to person, place, time, and situation   Language: clear and fluent, intact comprehension and repetition, intact naming and reading    Cranial Nerves  II: full visual fields intact   III, IV, VI: PERRL, EOMs full  V, VII: facial sensation and movement intact and symmetric   VIII: hearing intact   IX, X: uvula midline, soft palate elevates normally   XI: BL shoulder shrug intact   XII: tongue midline    Motor  5+ BUE, LLE  unable to assess strength in RLE d/t brace for drop foot                Tone and bulk are normal in upper and lower limbs  No pronator drift    Sensation  Intact to light touch and pinprick in all 4 EXTs    Reflex  2+ in BL biceps, triceps, brachioradialis, patella                                     Coordination  Normal FTN        LABS:                          15.0   8.9   )-----------( 324      ( 05 Oct 2018 23:12 )             45.7     10-05    142  |  109<H>  |  4<L>  ----------------------------<  91  3.2<L>   |  23  |  0.72    Ca    9.4      05 Oct 2018 23:12    TPro  7.2  /  Alb  4.5  /  TBili  0.3  /  DBili  x   /  AST  8<L>  /  ALT  12  /  AlkPhos  88  10-05    CAPILLARY BLOOD GLUCOSE        LIVER FUNCTIONS - ( 05 Oct 2018 23:12 )  Alb: 4.5 g/dL / Pro: 7.2 g/dL / ALK PHOS: 88 U/L / ALT: 12 U/L / AST: 8 U/L / GGT: x           PT/INR - ( 05 Oct 2018 23:12 )   PT: 11.3 sec;   INR: 1.05 ratio         PTT - ( 05 Oct 2018 23:12 )  PTT:35.9 sec

## 2018-10-07 NOTE — PROGRESS NOTE ADULT - ASSESSMENT
Impression:  37yo C woman with a PMHx of right foot drop d/t lumbar pathology, Chiari I malformation s/p fossa decompression x 3, EDS, asthma, IBS, seizures on Topamax, syncope and tethered cord syndrome here for HA x 3 days & seizures x 3 this AM. Last seizure Monday, usually infrequent. On Topamax for headaches, increased for  seizures though no o/p f/u. Patient states  reports GTC type seizures. Diffuse frontal HA since CT myelogram for foot drop. No fevers, recent travel. Headache better with lying flat.   Patient states that she has had 3 seizures today.  Aura present poorly defined. Post-ictal confusion for several minutes.   Patient has no other complaints. Patient denies fevers, chills, ns, cp, sob, abd pain, n/v/d/c, weakness, or changes to weight or senses.     Seizure like activity    Plan:  - Keppra 500mg BID  - Ativan 2mg IV PRN seizure activity lasting >3-5 mins, >2/hr, >3/day.   - 24hr vEEG Impression:  39yo C woman with a PMHx of right foot drop d/t lumbar pathology, Chiari I malformation s/p fossa decompression x 3, EDS, asthma, IBS, seizures on Topamax, syncope and tethered cord syndrome here for HA x 3 days & seizures x 3 this AM. Last seizure Monday, usually infrequent. On Topamax for headaches, increased for  seizures though no o/p f/u. Patient states  reports GTC type seizures. Diffuse frontal HA since CT myelogram for foot drop. No fevers, recent travel. Headache better with lying flat.   Patient states that she has had 3 seizures today.  Aura present poorly defined. Post-ictal confusion for several minutes.   Patient has no other complaints. Patient denies fevers, chills, ns, cp, sob, abd pain, n/v/d/c, weakness, or changes to weight or senses.

## 2018-10-10 ENCOUNTER — APPOINTMENT (OUTPATIENT)
Dept: SPINE | Facility: CLINIC | Age: 38
End: 2018-10-10
Payer: MEDICARE

## 2018-10-10 ENCOUNTER — APPOINTMENT (OUTPATIENT)
Dept: ENDOCRINOLOGY | Facility: CLINIC | Age: 38
End: 2018-10-10

## 2018-10-10 PROCEDURE — 99213 OFFICE O/P EST LOW 20 MIN: CPT

## 2018-11-26 RX ORDER — VANCOMYCIN HCL 1 G
1 VIAL (EA) INTRAVENOUS
Qty: 0 | Refills: 0 | DISCHARGE
Start: 2018-11-26 | End: 2019-01-06

## 2018-11-26 RX ORDER — AZTREONAM 2 G
2 VIAL (EA) INJECTION
Qty: 0 | Refills: 0 | DISCHARGE
Start: 2018-11-26 | End: 2019-01-06

## 2018-12-13 ENCOUNTER — APPOINTMENT (OUTPATIENT)
Dept: NEUROLOGY | Facility: CLINIC | Age: 38
End: 2018-12-13

## 2019-01-04 ENCOUNTER — CHART COPY (OUTPATIENT)
Age: 39
End: 2019-01-04

## 2019-01-15 LAB
HCT VFR BLDA CALC: 44 — SIGNIFICANT CHANGE UP (ref 39–50)
HGB BLD CALC-MCNC: 14.3 G/DL — SIGNIFICANT CHANGE UP (ref 11.5–15.5)
OTHER CELLS CSF MANUAL: 12 ML/DL — LOW (ref 18–22)

## 2019-01-26 ENCOUNTER — EMERGENCY (EMERGENCY)
Facility: HOSPITAL | Age: 39
LOS: 1 days | Discharge: OTHER INSTITUTION | End: 2019-01-26
Attending: EMERGENCY MEDICINE
Payer: MEDICARE

## 2019-01-26 VITALS
RESPIRATION RATE: 14 BRPM | OXYGEN SATURATION: 98 % | DIASTOLIC BLOOD PRESSURE: 72 MMHG | TEMPERATURE: 98 F | SYSTOLIC BLOOD PRESSURE: 113 MMHG | WEIGHT: 164.91 LBS | HEIGHT: 63 IN | HEART RATE: 93 BPM

## 2019-01-26 VITALS
DIASTOLIC BLOOD PRESSURE: 87 MMHG | SYSTOLIC BLOOD PRESSURE: 120 MMHG | OXYGEN SATURATION: 99 % | HEART RATE: 99 BPM | TEMPERATURE: 98 F | RESPIRATION RATE: 18 BRPM

## 2019-01-26 DIAGNOSIS — Z98.89 OTHER SPECIFIED POSTPROCEDURAL STATES: Chronic | ICD-10-CM

## 2019-01-26 DIAGNOSIS — Z98.890 OTHER SPECIFIED POSTPROCEDURAL STATES: Chronic | ICD-10-CM

## 2019-01-26 LAB
ALBUMIN SERPL ELPH-MCNC: 3.5 G/DL — SIGNIFICANT CHANGE UP (ref 3.3–5.2)
ALP SERPL-CCNC: 84 U/L — SIGNIFICANT CHANGE UP (ref 40–120)
ALT FLD-CCNC: 30 U/L — SIGNIFICANT CHANGE UP
ANION GAP SERPL CALC-SCNC: 11 MMOL/L — SIGNIFICANT CHANGE UP (ref 5–17)
APAP SERPL-MCNC: <7.5 UG/ML — LOW (ref 10–26)
APTT BLD: 28.6 SEC — SIGNIFICANT CHANGE UP (ref 27.5–36.3)
AST SERPL-CCNC: 15 U/L — SIGNIFICANT CHANGE UP
BASOPHILS # BLD AUTO: 0 K/UL — SIGNIFICANT CHANGE UP (ref 0–0.2)
BASOPHILS NFR BLD AUTO: 0.3 % — SIGNIFICANT CHANGE UP (ref 0–2)
BILIRUB SERPL-MCNC: <0.2 MG/DL — LOW (ref 0.4–2)
BUN SERPL-MCNC: 11 MG/DL — SIGNIFICANT CHANGE UP (ref 8–20)
CALCIUM SERPL-MCNC: 8.9 MG/DL — SIGNIFICANT CHANGE UP (ref 8.6–10.2)
CHLORIDE SERPL-SCNC: 109 MMOL/L — HIGH (ref 98–107)
CO2 SERPL-SCNC: 22 MMOL/L — SIGNIFICANT CHANGE UP (ref 22–29)
CREAT SERPL-MCNC: 0.52 MG/DL — SIGNIFICANT CHANGE UP (ref 0.5–1.3)
EOSINOPHIL # BLD AUTO: 0.2 K/UL — SIGNIFICANT CHANGE UP (ref 0–0.5)
EOSINOPHIL NFR BLD AUTO: 2.2 % — SIGNIFICANT CHANGE UP (ref 0–6)
ETHANOL SERPL-MCNC: <10 MG/DL — SIGNIFICANT CHANGE UP
GLUCOSE SERPL-MCNC: 102 MG/DL — SIGNIFICANT CHANGE UP (ref 70–115)
HCG SERPL-ACNC: <4 MIU/ML — SIGNIFICANT CHANGE UP
HCT VFR BLD CALC: 38.8 % — SIGNIFICANT CHANGE UP (ref 37–47)
HGB BLD-MCNC: 12 G/DL — SIGNIFICANT CHANGE UP (ref 12–16)
INR BLD: 1.05 RATIO — SIGNIFICANT CHANGE UP (ref 0.88–1.16)
LACTATE BLDV-MCNC: 1.2 MMOL/L — SIGNIFICANT CHANGE UP (ref 0.5–2)
LYMPHOCYTES # BLD AUTO: 1.7 K/UL — SIGNIFICANT CHANGE UP (ref 1–4.8)
LYMPHOCYTES # BLD AUTO: 23 % — SIGNIFICANT CHANGE UP (ref 20–55)
MAGNESIUM SERPL-MCNC: 1.8 MG/DL — SIGNIFICANT CHANGE UP (ref 1.6–2.6)
MCHC RBC-ENTMCNC: 26.5 PG — LOW (ref 27–31)
MCHC RBC-ENTMCNC: 30.9 G/DL — LOW (ref 32–36)
MCV RBC AUTO: 85.7 FL — SIGNIFICANT CHANGE UP (ref 81–99)
MONOCYTES # BLD AUTO: 0.7 K/UL — SIGNIFICANT CHANGE UP (ref 0–0.8)
MONOCYTES NFR BLD AUTO: 9.1 % — SIGNIFICANT CHANGE UP (ref 3–10)
NEUTROPHILS # BLD AUTO: 4.7 K/UL — SIGNIFICANT CHANGE UP (ref 1.8–8)
NEUTROPHILS NFR BLD AUTO: 65 % — SIGNIFICANT CHANGE UP (ref 37–73)
PLATELET # BLD AUTO: 255 K/UL — SIGNIFICANT CHANGE UP (ref 150–400)
POTASSIUM SERPL-MCNC: 4 MMOL/L — SIGNIFICANT CHANGE UP (ref 3.5–5.3)
POTASSIUM SERPL-SCNC: 4 MMOL/L — SIGNIFICANT CHANGE UP (ref 3.5–5.3)
PROT SERPL-MCNC: 5.9 G/DL — LOW (ref 6.6–8.7)
PROTHROM AB SERPL-ACNC: 12.1 SEC — SIGNIFICANT CHANGE UP (ref 10–12.9)
RBC # BLD: 4.53 M/UL — SIGNIFICANT CHANGE UP (ref 4.4–5.2)
RBC # FLD: 15.9 % — HIGH (ref 11–15.6)
SALICYLATES SERPL-MCNC: <0.6 MG/DL — LOW (ref 10–20)
SODIUM SERPL-SCNC: 142 MMOL/L — SIGNIFICANT CHANGE UP (ref 135–145)
WBC # BLD: 7.3 K/UL — SIGNIFICANT CHANGE UP (ref 4.8–10.8)
WBC # FLD AUTO: 7.3 K/UL — SIGNIFICANT CHANGE UP (ref 4.8–10.8)

## 2019-01-26 PROCEDURE — 85730 THROMBOPLASTIN TIME PARTIAL: CPT

## 2019-01-26 PROCEDURE — 80053 COMPREHEN METABOLIC PANEL: CPT

## 2019-01-26 PROCEDURE — 70450 CT HEAD/BRAIN W/O DYE: CPT | Mod: 26

## 2019-01-26 PROCEDURE — 83735 ASSAY OF MAGNESIUM: CPT

## 2019-01-26 PROCEDURE — 71045 X-RAY EXAM CHEST 1 VIEW: CPT

## 2019-01-26 PROCEDURE — 87040 BLOOD CULTURE FOR BACTERIA: CPT

## 2019-01-26 PROCEDURE — 99285 EMERGENCY DEPT VISIT HI MDM: CPT

## 2019-01-26 PROCEDURE — 84702 CHORIONIC GONADOTROPIN TEST: CPT

## 2019-01-26 PROCEDURE — 83605 ASSAY OF LACTIC ACID: CPT

## 2019-01-26 PROCEDURE — 71045 X-RAY EXAM CHEST 1 VIEW: CPT | Mod: 26

## 2019-01-26 PROCEDURE — 70450 CT HEAD/BRAIN W/O DYE: CPT

## 2019-01-26 PROCEDURE — 99285 EMERGENCY DEPT VISIT HI MDM: CPT | Mod: 25

## 2019-01-26 PROCEDURE — 96361 HYDRATE IV INFUSION ADD-ON: CPT

## 2019-01-26 PROCEDURE — 36415 COLL VENOUS BLD VENIPUNCTURE: CPT

## 2019-01-26 PROCEDURE — 96374 THER/PROPH/DIAG INJ IV PUSH: CPT

## 2019-01-26 PROCEDURE — 85027 COMPLETE CBC AUTOMATED: CPT

## 2019-01-26 PROCEDURE — 80307 DRUG TEST PRSMV CHEM ANLYZR: CPT

## 2019-01-26 PROCEDURE — 82962 GLUCOSE BLOOD TEST: CPT

## 2019-01-26 PROCEDURE — 85610 PROTHROMBIN TIME: CPT

## 2019-01-26 RX ORDER — SODIUM CHLORIDE 9 MG/ML
1000 INJECTION INTRAMUSCULAR; INTRAVENOUS; SUBCUTANEOUS ONCE
Qty: 0 | Refills: 0 | Status: COMPLETED | OUTPATIENT
Start: 2019-01-26 | End: 2019-01-26

## 2019-01-26 RX ORDER — LEVETIRACETAM 250 MG/1
1000 TABLET, FILM COATED ORAL ONCE
Qty: 0 | Refills: 0 | Status: COMPLETED | OUTPATIENT
Start: 2019-01-26 | End: 2019-01-26

## 2019-01-26 RX ADMIN — LEVETIRACETAM 1000 MILLIGRAM(S): 250 TABLET, FILM COATED ORAL at 16:24

## 2019-01-26 RX ADMIN — SODIUM CHLORIDE 1000 MILLILITER(S): 9 INJECTION INTRAMUSCULAR; INTRAVENOUS; SUBCUTANEOUS at 18:01

## 2019-01-26 RX ADMIN — SODIUM CHLORIDE 1000 MILLILITER(S): 9 INJECTION INTRAMUSCULAR; INTRAVENOUS; SUBCUTANEOUS at 17:01

## 2019-01-26 RX ADMIN — LEVETIRACETAM 400 MILLIGRAM(S): 250 TABLET, FILM COATED ORAL at 16:09

## 2019-01-26 NOTE — ED PROVIDER NOTE - OBJECTIVE STATEMENT
38yoF with h/o Chiari I malformation, asthma, tethered cord, back pain, anxiety, seizure disorder pw multiple seizures today.  Per EMS, they were called as pt had 3 back to back seizures at home; they state that pt was A&O 3 when they arrived and did not want to come to hospital - they received approval for RMA; however, when they left, appx 15 minutes later family reported 4 more seizures; EMS states that she had 2 seizures with them with full return to baseline in between (last appx 10 minutes prior to arrival).  Family reports seizures last about 1-1.5 min each;  last seizure prior to today was 2 weeks ago.  She reports pt has only about 3 seizures at a time  but that they are not frequent.  Pt had 2 posterior fossa decompressions this year  and 2 lumbar surgeries. Pt states she completed a course of iv antibiotics in January via PICC line for a presumed infection after the lumbar surgery (vanco/cipro/aztreonam), but was told that she has a fluid collection in the posterior fossa and is due for another decompression and washout on Tuesday (ELLY Olea).  Pt denies any other new symptoms prior to seizures today, including fever, chills, flu like symptoms, cough, abdominal pain, vomiting, diarrhea. Pt reports compliance with her seizure meds and states she took them today.  While pt awaiting CT, she had another seizure like episode lasting <1 min 38yoF with h/o Chiari I malformation, asthma, tethered cord, back pain, anxiety, seizure disorder pw multiple seizures today.  Per EMS, they were called as pt had 3 back to back seizures at home; they state that pt was A&O 3 when they arrived and did not want to come to hospital - they received approval for RMA; however, when they left, appx 15 minutes later family reported 4 more seizures; EMS states that she had 2 seizures with them with full return to baseline in between (last appx 10 minutes prior to arrival) with minimal-no post-ictal period.  Family reports seizures last about 1-1.5 min each;  last seizure prior to today was 2 weeks ago.  She reports pt has only about 3 seizures at a time  but that they are not frequent.  Pt had 2 posterior fossa decompressions this year  and 2 lumbar surgeries. Pt states she completed a course of iv antibiotics in January via PICC line for a presumed infection after the lumbar surgery (vanco/cipro/aztreonam), but was told that she has a fluid collection in the posterior fossa and is due for another decompression and washout on Tuesday (S. Emmie).  Pt denies any other new symptoms prior to seizures today, including fever, chills, flu like symptoms, cough, abdominal pain, vomiting, diarrhea. Pt reports compliance with her seizure meds and states she took them today.  While pt awaiting CT, she had another seizure like episode lasting <1 min with thrusting like movements and minimal post -ictal phase

## 2019-01-26 NOTE — ED PROVIDER NOTE - PROGRESS NOTE DETAILS
I paged Dr. Diego 's service at [facility] and spoke with ISH Alvarado who will reach out to his service and call me back. Dr. Diego's service requesting ER- ER transfer; I spoke with Dr. Menkes at [facility] who accepted transfer. Pt understands and agrees to transfer. EMS called for transfer.

## 2019-01-26 NOTE — ED ADULT NURSE NOTE - INTERVENTIONS DEFINITIONS
Stretcher in lowest position, wheels locked, appropriate side rails in place/Simms to call system/Non-slip footwear when patient is off stretcher/Call bell, personal items and telephone within reach

## 2019-01-26 NOTE — ED ADULT TRIAGE NOTE - CHIEF COMPLAINT QUOTE
patient c/o seizures. EMS got the call and originally patient refused to go to hospital, sent away ambulance, then patient had more seizures, EMS came back and then, had more on the EMS Bus ride to hospital. patient with hx of Kiari Malformation and is scheduled for surgery, MD Alvarez called to the bedside to assess. 10mg of versed given By EMS. Patient non-verbal, opens eyes on command (Post Ictal).

## 2019-01-26 NOTE — PHARMACOTHERAPY INTERVENTION NOTE - COMMENTS
Spoke to patient and called pharmacy to confirm medications and doses. Was previously on azactam/vancomycin for post op wound infection from 11/26/18 to 01/06/19.
Multiple seizures

## 2019-01-26 NOTE — ED PROVIDER NOTE - MEDICAL DECISION MAKING DETAILS
Pt with h/o Chiari I , seizure d/o presenting with multiple seizure episodes today, currently neurologically intact, without acute distress; f/u CTh/ labs/ d/w pt's neurosurgeon as she is pending washout on tuesday

## 2019-01-26 NOTE — ED ADULT NURSE NOTE - GENITOURINARY WDL
Anesthesia ROS/Med Hx        Pulmonary Review:    Negative for pulmonary    Neuro/Psych Review:    Negative for all neuro/psych ROS    Cardiovascular Review:    Pt. positive for hyperlipidemia    GI/HEPATIC/RENAL Review:    Pt. negative for GI/Hepatic/Renal ROS    End/Other Review:    Pt. positive for obesity class I - 30.00 - 34.99    Anesthesia Plan  ASA Status: 2  Anesthesia Type: MAC  Induction: Intravenous  Reviewed: Lab Results, Pre-Induction Reassessment, Medications, Nursing Notes, Patient Summary, NPO Status, Problem List, Consultations, Allergies and Past Med History  The proposed anesthetic plan, including its risks and benefits, have been discussed with the Patient - along with the risks and benefits of alternatives.  Questions were encouraged and answered and the patient and/or representative agrees to proceed.  Blood Products: Not Anticipated      Physical Exam  Mallampati: I  Neck ROM: Full  cardiovascular exam normal  pulmonary exam normal  abdominal exam normal  dental exam normal       Bladder non-tender and non-distended.

## 2019-01-26 NOTE — ED ADULT NURSE NOTE - OBJECTIVE STATEMENT
assumed care of pt @ 1515 as critical care RN as per ED protocol. as per EMS, pt had 3 seizures at home. one seizure en route. compliant with medications. MD Alvarez witnessed possible seizure just prior to priority CT. priority CT done. no tongue laceration present. no urinary incontinence. pt a&ox3 at this time. recently treated for infection s/p lower back surgery. PICC line removed Jan 6th.

## 2019-01-28 ENCOUNTER — OUTPATIENT (OUTPATIENT)
Dept: OUTPATIENT SERVICES | Facility: HOSPITAL | Age: 39
LOS: 1 days | End: 2019-01-28

## 2019-01-28 ENCOUNTER — APPOINTMENT (OUTPATIENT)
Dept: MRI IMAGING | Facility: CLINIC | Age: 39
End: 2019-01-28
Payer: MEDICARE

## 2019-01-28 DIAGNOSIS — Z98.890 OTHER SPECIFIED POSTPROCEDURAL STATES: Chronic | ICD-10-CM

## 2019-01-28 DIAGNOSIS — Z00.8 ENCOUNTER FOR OTHER GENERAL EXAMINATION: ICD-10-CM

## 2019-01-28 DIAGNOSIS — Z98.89 OTHER SPECIFIED POSTPROCEDURAL STATES: Chronic | ICD-10-CM

## 2019-01-28 PROCEDURE — 72141 MRI NECK SPINE W/O DYE: CPT | Mod: 26

## 2019-02-26 NOTE — ED ADULT NURSE NOTE - NSSISCREENINGQ5_ED_A_ED
form faxed from mom, Mireille, for Dr Petersen to sign. Fax completed form back to Mireille at [facility] at fax # [phone].    11-2-18 last well child exam     No

## 2019-03-01 ENCOUNTER — EMERGENCY (EMERGENCY)
Facility: HOSPITAL | Age: 39
LOS: 1 days | Discharge: HOME | End: 2019-03-01
Attending: EMERGENCY MEDICINE
Payer: MEDICARE

## 2019-03-01 VITALS
RESPIRATION RATE: 16 BRPM | HEIGHT: 63 IN | DIASTOLIC BLOOD PRESSURE: 71 MMHG | WEIGHT: 164.91 LBS | TEMPERATURE: 99 F | SYSTOLIC BLOOD PRESSURE: 113 MMHG | OXYGEN SATURATION: 95 % | HEART RATE: 80 BPM

## 2019-03-01 DIAGNOSIS — Z98.89 OTHER SPECIFIED POSTPROCEDURAL STATES: Chronic | ICD-10-CM

## 2019-03-01 DIAGNOSIS — Z98.890 OTHER SPECIFIED POSTPROCEDURAL STATES: Chronic | ICD-10-CM

## 2019-03-01 PROBLEM — R56.9 UNSPECIFIED CONVULSIONS: Chronic | Status: ACTIVE | Noted: 2019-01-26

## 2019-03-01 PROCEDURE — 99284 EMERGENCY DEPT VISIT MOD MDM: CPT

## 2019-03-01 RX ORDER — ACETAMINOPHEN 500 MG
650 TABLET ORAL ONCE
Qty: 0 | Refills: 0 | Status: DISCONTINUED | OUTPATIENT
Start: 2019-03-01 | End: 2019-03-06

## 2019-03-01 RX ORDER — METOCLOPRAMIDE HCL 10 MG
10 TABLET ORAL ONCE
Qty: 0 | Refills: 0 | Status: DISCONTINUED | OUTPATIENT
Start: 2019-03-01 | End: 2019-03-06

## 2019-03-01 NOTE — ED PROVIDER NOTE - PMH
Cervical disc disease  h/o cervical fusion  Chiari I malformation    Chiari I malformation  diagnsoed in 2016; decompression Feb 2018  Chiari syndrome  Arnold Chiari syndrome  Craniovertebral instability    EDS (Su-Danlos syndrome)    Su-Danlos syndrome    History of asthma  without exacerbations  Irritable bowel syndrome without diarrhea  with constipation  Low back pain  chronic 2012  Seizure  onset 5/23/18, ER visit to [facility] . Was discharged on no anti seizure medications .  Seizure    Seizure disorder    Sinus tachycardia  on Metoprolol ( controlled )  Spinal surgery in prior 3 months    Syncope  episodes when flexes her neck forward  Tethered cord syndrome

## 2019-03-01 NOTE — ED PROVIDER NOTE - PSH
H/O brain surgery    H/O cervical spine surgery    H/O craniotomy  16 suboccipital crani for C1 arch resection w posterior occipital cervical hardware fusion to C2 , was revised 18.  H/O lumbosacral spine surgery    H/O:     History of back surgery  2017--  T12 vertebral column resection, T8-L2 instrumentation and fusion.  History of cholecystectomy  laparoscopic   History of tonsillectomy  age 18  S/P lumbar laminectomy  , complicated with CSF leak, S/P blood patch  S/P myelogram  c/b CSF leak, spinal headache, S/P blood patch

## 2019-03-01 NOTE — ED PROVIDER NOTE - OBJECTIVE STATEMENT
This patient is a 39 year old woman with hx of Chiari malformation s/p surgery on 1/29 and seizures (on Keppra and Topomax) who presents to the ER with her mother reporting 4 seizures just prior to arrival.  Patient states that she began experiencing a headache last night similar to the headaches she has been experiencing since the surgery.  She denies fever, neck pain, vision changes and nausea.  Her mother states that the patient's daughter called out to the grandmother and they found the patient seizing.  Patient denies drug and alcohol use or abuse or withdrawal.  She states that she is complaint with her medications.  She has been taking oxycodone with no relief of her head pain. This patient is a 39 year old woman with hx of Chiari malformation s/p surgery on 1/29 and seizures (on Keppra and Topomax) who presents to the ER with her mother reporting 4 seizures just prior to arrival.  Patient states that she began experiencing a headache last night similar to the headaches she has been experiencing since the surgery.  She denies fever, neck pain, vision changes and nausea.  Her mother states that the patient's daughter called out to the grandmother and they found the patient seizing.  Patient denies drug and alcohol use or abuse or withdrawal.  She states that she is complaint with her medications.  She has been taking oxycodone with no relief of her head pain.  Patient reports that she was recently put ox anti-coagulation after a DVT to her arm in which she was receiving Abx through a PICC line after wound site infection after brain surgery.

## 2019-03-01 NOTE — ED PROVIDER NOTE - CLINICAL SUMMARY MEDICAL DECISION MAKING FREE TEXT BOX
39 year old woman hx of seizure and on anti-coagulation after a DVT c/o seizure and headache.  Patient neurologically intact.  She was offered medication for pain.  Patient then refused workup and signed out AMA.

## 2019-03-01 NOTE — ED ADULT TRIAGE NOTE - CHIEF COMPLAINT QUOTE
reports multiple seizures today. hx of seizures on Topamax and Keppra. a and o x3. breathing even and unlabored. reports multiple seizures today. hx of seizures on Topamax and Keppra. a and o x3. breathing even and unlabored. 20 to lac.

## 2019-03-01 NOTE — ED ADULT NURSE NOTE - CHIEF COMPLAINT QUOTE
reports multiple seizures today. hx of seizures on Topamax and Keppra. a and o x3. breathing even and unlabored. 20 to lac.

## 2019-03-09 ENCOUNTER — TRANSCRIPTION ENCOUNTER (OUTPATIENT)
Age: 39
End: 2019-03-09

## 2019-03-28 ENCOUNTER — EMERGENCY (EMERGENCY)
Facility: HOSPITAL | Age: 39
LOS: 1 days | Discharge: LEFT BEFORE DISCHARGE | End: 2019-03-28
Attending: EMERGENCY MEDICINE
Payer: MEDICARE

## 2019-03-28 VITALS
HEART RATE: 76 BPM | SYSTOLIC BLOOD PRESSURE: 124 MMHG | OXYGEN SATURATION: 100 % | DIASTOLIC BLOOD PRESSURE: 76 MMHG | RESPIRATION RATE: 18 BRPM

## 2019-03-28 VITALS
RESPIRATION RATE: 16 BRPM | SYSTOLIC BLOOD PRESSURE: 138 MMHG | HEART RATE: 100 BPM | HEIGHT: 63 IN | WEIGHT: 164.91 LBS | OXYGEN SATURATION: 100 % | DIASTOLIC BLOOD PRESSURE: 93 MMHG | TEMPERATURE: 98 F

## 2019-03-28 DIAGNOSIS — Z98.89 OTHER SPECIFIED POSTPROCEDURAL STATES: Chronic | ICD-10-CM

## 2019-03-28 DIAGNOSIS — Z98.890 OTHER SPECIFIED POSTPROCEDURAL STATES: Chronic | ICD-10-CM

## 2019-03-28 LAB
ALBUMIN SERPL ELPH-MCNC: 4 G/DL — SIGNIFICANT CHANGE UP (ref 3.3–5.2)
ALP SERPL-CCNC: 84 U/L — SIGNIFICANT CHANGE UP (ref 40–120)
ALT FLD-CCNC: 16 U/L — SIGNIFICANT CHANGE UP
ANION GAP SERPL CALC-SCNC: 13 MMOL/L — SIGNIFICANT CHANGE UP (ref 5–17)
AST SERPL-CCNC: 12 U/L — SIGNIFICANT CHANGE UP
BASOPHILS # BLD AUTO: 0 K/UL — SIGNIFICANT CHANGE UP (ref 0–0.2)
BASOPHILS NFR BLD AUTO: 0.1 % — SIGNIFICANT CHANGE UP (ref 0–2)
BILIRUB SERPL-MCNC: <0.2 MG/DL — LOW (ref 0.4–2)
BUN SERPL-MCNC: 8 MG/DL — SIGNIFICANT CHANGE UP (ref 8–20)
CALCIUM SERPL-MCNC: 9.1 MG/DL — SIGNIFICANT CHANGE UP (ref 8.6–10.2)
CHLORIDE SERPL-SCNC: 109 MMOL/L — HIGH (ref 98–107)
CO2 SERPL-SCNC: 18 MMOL/L — LOW (ref 22–29)
CREAT SERPL-MCNC: 0.56 MG/DL — SIGNIFICANT CHANGE UP (ref 0.5–1.3)
EOSINOPHIL # BLD AUTO: 0.1 K/UL — SIGNIFICANT CHANGE UP (ref 0–0.5)
EOSINOPHIL NFR BLD AUTO: 1.4 % — SIGNIFICANT CHANGE UP (ref 0–6)
GLUCOSE SERPL-MCNC: 101 MG/DL — SIGNIFICANT CHANGE UP (ref 70–115)
HCG SERPL-ACNC: <4 MIU/ML — SIGNIFICANT CHANGE UP
HCT VFR BLD CALC: 42.1 % — SIGNIFICANT CHANGE UP (ref 37–47)
HGB BLD-MCNC: 13.5 G/DL — SIGNIFICANT CHANGE UP (ref 12–16)
LYMPHOCYTES # BLD AUTO: 2.8 K/UL — SIGNIFICANT CHANGE UP (ref 1–4.8)
LYMPHOCYTES # BLD AUTO: 34.1 % — SIGNIFICANT CHANGE UP (ref 20–55)
MCHC RBC-ENTMCNC: 27.6 PG — SIGNIFICANT CHANGE UP (ref 27–31)
MCHC RBC-ENTMCNC: 32.1 G/DL — SIGNIFICANT CHANGE UP (ref 32–36)
MCV RBC AUTO: 86.1 FL — SIGNIFICANT CHANGE UP (ref 81–99)
MONOCYTES # BLD AUTO: 0.5 K/UL — SIGNIFICANT CHANGE UP (ref 0–0.8)
MONOCYTES NFR BLD AUTO: 5.6 % — SIGNIFICANT CHANGE UP (ref 3–10)
NEUTROPHILS # BLD AUTO: 4.8 K/UL — SIGNIFICANT CHANGE UP (ref 1.8–8)
NEUTROPHILS NFR BLD AUTO: 58.6 % — SIGNIFICANT CHANGE UP (ref 37–73)
PLATELET # BLD AUTO: 261 K/UL — SIGNIFICANT CHANGE UP (ref 150–400)
POTASSIUM SERPL-MCNC: 3.7 MMOL/L — SIGNIFICANT CHANGE UP (ref 3.5–5.3)
POTASSIUM SERPL-SCNC: 3.7 MMOL/L — SIGNIFICANT CHANGE UP (ref 3.5–5.3)
PROT SERPL-MCNC: 6.4 G/DL — LOW (ref 6.6–8.7)
RBC # BLD: 4.89 M/UL — SIGNIFICANT CHANGE UP (ref 4.4–5.2)
RBC # FLD: 16.3 % — HIGH (ref 11–15.6)
SODIUM SERPL-SCNC: 140 MMOL/L — SIGNIFICANT CHANGE UP (ref 135–145)
WBC # BLD: 8.3 K/UL — SIGNIFICANT CHANGE UP (ref 4.8–10.8)
WBC # FLD AUTO: 8.3 K/UL — SIGNIFICANT CHANGE UP (ref 4.8–10.8)

## 2019-03-28 PROCEDURE — 80053 COMPREHEN METABOLIC PANEL: CPT

## 2019-03-28 PROCEDURE — 70450 CT HEAD/BRAIN W/O DYE: CPT | Mod: 26

## 2019-03-28 PROCEDURE — 85027 COMPLETE CBC AUTOMATED: CPT

## 2019-03-28 PROCEDURE — 71045 X-RAY EXAM CHEST 1 VIEW: CPT | Mod: 26

## 2019-03-28 PROCEDURE — 70450 CT HEAD/BRAIN W/O DYE: CPT

## 2019-03-28 PROCEDURE — 93005 ELECTROCARDIOGRAM TRACING: CPT

## 2019-03-28 PROCEDURE — 93010 ELECTROCARDIOGRAM REPORT: CPT

## 2019-03-28 PROCEDURE — 36415 COLL VENOUS BLD VENIPUNCTURE: CPT

## 2019-03-28 PROCEDURE — 99284 EMERGENCY DEPT VISIT MOD MDM: CPT | Mod: 25

## 2019-03-28 PROCEDURE — 99284 EMERGENCY DEPT VISIT MOD MDM: CPT

## 2019-03-28 PROCEDURE — 71045 X-RAY EXAM CHEST 1 VIEW: CPT

## 2019-03-28 PROCEDURE — 84702 CHORIONIC GONADOTROPIN TEST: CPT

## 2019-03-28 RX ORDER — SODIUM CHLORIDE 9 MG/ML
1000 INJECTION INTRAMUSCULAR; INTRAVENOUS; SUBCUTANEOUS ONCE
Qty: 0 | Refills: 0 | Status: COMPLETED | OUTPATIENT
Start: 2019-03-28 | End: 2019-03-28

## 2019-03-28 NOTE — ED ADULT NURSE NOTE - OBJECTIVE STATEMENT
39 year old female a&ox3 comes to ED c/o a couple of seizures around 3pm witnessed by EMS, and daughter. pt. states she hit her head when she had a seizure, states she fell. pt. c/o headache.

## 2019-03-28 NOTE — ED ADULT NURSE REASSESSMENT NOTE - NS ED NURSE REASSESS COMMENT FT1
report from Kayla LEVIN pt not in stretcher, RN Kayla notified charge nurse ePnny, called code flight security at stretcher see RN note.

## 2019-03-28 NOTE — ED ADULT NURSE NOTE - PMH
Cervical disc disease  h/o cervical fusion  Chiari I malformation    Chiari I malformation  diagnsoed in 2016; decompression Feb 2018  Chiari syndrome  Arnold Chiari syndrome  Craniovertebral instability    EDS (Su-Danlos syndrome)    Su-Danlos syndrome    History of asthma  without exacerbations  Irritable bowel syndrome without diarrhea  with constipation  Low back pain  chronic 2012  Seizure  onset 5/23/18, ER visit to [facility] . Was discharged on no anti seizure medications .  Seizure    Seizure disorder    Sinus tachycardia  on Metoprolol ( controlled )  Spinal surgery in prior 3 months    Syncope  episodes when flexes her neck forward  Tethered cord syndrome

## 2019-03-28 NOTE — ED ADULT NURSE REASSESSMENT NOTE - NS ED NURSE REASSESS COMMENT FT1
RN. returned from code and patient was not at bedside, charge nurse made aware. CODE FLIGHT CALLED. police called.

## 2019-03-28 NOTE — ED ADULT NURSE REASSESSMENT NOTE - NS ED NURSE REASSESS COMMENT FT1
Pt. states she is having 8/10 mid sternal chest pain 8/10. pt. c/o slight SOB. EKG obtained, vss. as documented. MD. Alvarez made aware.

## 2019-03-28 NOTE — ED ADULT TRIAGE NOTE - CHIEF COMPLAINT QUOTE
Pt had multiple tonic clonic seizures witnessed by family and EMS. Pt c/o headache for a couple of days, /o brains surgery and chiari malformation

## 2019-03-29 NOTE — ED PROVIDER NOTE - OBJECTIVE STATEMENT
This note serves for encounter on 3/28:  PT is a 39yoF with h/o Chiari I malformation. rosalinda Germain d/o  w h/o previous posterior fossa surgery, last one in January for washout s/p iv abx via picc line, h/o DVT on eliquis, presenting with seizure; Pt notes mild headache today;  SHe notes it is normal for her to have posterior headaches;  she states she takes her keppra and topamax' religiously'  and took it today;  she denies any changes in medications, vomiting, diarrhea, changes in PO intake, changes in sleep or changes in stress. Denies any drug use . Pt otherwise feeling okay.

## 2019-03-29 NOTE — ED PROVIDER NOTE - PMH
Cervical disc disease  h/o cervical fusion  Chiari I malformation    Chiari I malformation  diagnsoed in 2016; decompression Feb 2018  Chiari syndrome  Arnold Chiari syndrome  Craniovertebral instability    EDS (Su-Danlos syndrome)    Su-Danlos syndrome    History of asthma  without exacerbations  Irritable bowel syndrome without diarrhea  with constipation  Low back pain  chronic 2012  Seizure  onset 5/23/18, ER visit to [facility] . Was discharged on no anti seizure medications .  Seizure    Seizure disorder    Sinus tachycardia  on Metoprolol ( controlled )  Spinal surgery in prior 3 months    Syncope  episodes when flexes her neck forward  Tethered cord syndrome

## 2019-03-29 NOTE — ED PROVIDER NOTE - CLINICAL SUMMARY MEDICAL DECISION MAKING FREE TEXT BOX
Pt with h/o Chiari Malformation, Su Danlos, seizure d/o presenting with breakough seizure today;  pt well appearing in no distress, neurologically intact; plan to check lab, CTh, d/w neurology and reevaluate.

## 2019-04-23 ENCOUNTER — APPOINTMENT (OUTPATIENT)
Dept: MRI IMAGING | Facility: CLINIC | Age: 39
End: 2019-04-23

## 2019-05-12 ENCOUNTER — TRANSCRIPTION ENCOUNTER (OUTPATIENT)
Age: 39
End: 2019-05-12

## 2019-06-05 NOTE — ED ADULT TRIAGE NOTE - ADDITIONAL SAFETY/BANDS...
Additional Safety/Bands: Pt rec'ed supine in bed, NAD, +call bell, son at bedside, family prefers to translate as pt Valerie speaking

## 2019-06-12 ENCOUNTER — TRANSCRIPTION ENCOUNTER (OUTPATIENT)
Age: 39
End: 2019-06-12

## 2019-06-18 NOTE — ED PROVIDER NOTE - PSH
Allergy;
H/O:   2004  History of cholecystectomy  laparoscopic 2005  History of tonsillectomy  age 18  S/P lumbar laminectomy  , complicated with CSF leak, S/P blood patch  S/P myelogram  c/b CSF leak, spinal headache, S/P blood patch

## 2019-06-19 ENCOUNTER — APPOINTMENT (OUTPATIENT)
Dept: SPINE | Facility: CLINIC | Age: 39
End: 2019-06-19
Payer: MEDICARE

## 2019-06-19 VITALS
WEIGHT: 170 LBS | SYSTOLIC BLOOD PRESSURE: 124 MMHG | HEIGHT: 63 IN | BODY MASS INDEX: 30.12 KG/M2 | DIASTOLIC BLOOD PRESSURE: 84 MMHG

## 2019-06-19 DIAGNOSIS — F17.200 NICOTINE DEPENDENCE, UNSPECIFIED, UNCOMPLICATED: ICD-10-CM

## 2019-06-19 PROCEDURE — 99213 OFFICE O/P EST LOW 20 MIN: CPT

## 2019-06-19 RX ORDER — DIAZEPAM 5 MG/1
5 TABLET ORAL EVERY 8 HOURS
Qty: 21 | Refills: 0 | Status: DISCONTINUED | COMMUNITY
Start: 2018-03-21 | End: 2019-06-19

## 2019-06-19 RX ORDER — LEVOTHYROXINE SODIUM 75 UG/1
75 TABLET ORAL DAILY
Qty: 90 | Refills: 0 | Status: DISCONTINUED | COMMUNITY
End: 2019-06-19

## 2019-06-19 RX ORDER — HYDROMORPHONE HYDROCHLORIDE 4 MG/1
4 TABLET ORAL
Qty: 28 | Refills: 0 | Status: DISCONTINUED | COMMUNITY
Start: 2018-02-21 | End: 2019-06-19

## 2019-06-20 ENCOUNTER — APPOINTMENT (OUTPATIENT)
Dept: OPHTHALMOLOGY | Facility: CLINIC | Age: 39
End: 2019-06-20
Payer: MEDICARE

## 2019-06-20 DIAGNOSIS — H53.8 OTHER VISUAL DISTURBANCES: ICD-10-CM

## 2019-06-20 PROCEDURE — 92014 COMPRE OPH EXAM EST PT 1/>: CPT

## 2019-06-20 PROCEDURE — 92083 EXTENDED VISUAL FIELD XM: CPT

## 2019-06-21 NOTE — REASON FOR VISIT
[Follow-Up: _____] : a [unfilled] follow-up visit [Other: _____] : [unfilled] [FreeTextEntry1] : 39 year old female seen in the fall of 2018 for a follow up for CM and retroflexed odontoid who underwent multiple procedures.  She continues to have right leg pain and a dropped foot.  The pain is progressing and she was recommended to have additional surgery to repair filum terminale and vertebral column shortening.

## 2019-06-21 NOTE — ASSESSMENT
[FreeTextEntry1] : Assess:\par Chiari Malformation \par Tethered cord\par Retro-flexed odontoid \par Numbness of arms and legs and pain in back \par \par \par PLAN:\par CT of thoracic spine to eval for bone growth \par She will return after images are completed for evaluation and treatment options

## 2019-06-26 ENCOUNTER — APPOINTMENT (OUTPATIENT)
Dept: CT IMAGING | Facility: CLINIC | Age: 39
End: 2019-06-26

## 2019-07-10 ENCOUNTER — TRANSCRIPTION ENCOUNTER (OUTPATIENT)
Age: 39
End: 2019-07-10

## 2019-07-17 ENCOUNTER — APPOINTMENT (OUTPATIENT)
Dept: SPINE | Facility: CLINIC | Age: 39
End: 2019-07-17

## 2019-08-01 NOTE — ED ADULT NURSE NOTE - HOW OFTEN DO YOU HAVE A DRINK CONTAINING ALCOHOL?
consent was obtained    Procedure: The patient is placed in prone position. Skin over the back was prepped and draped in sterile manner. Then using fluoroscopy the L4 interspace was observed and the skin and deep tissues in the right paramedian area were infiltrated with 3 ml of 1% lidocaine. The #20-gauge, 3-1/2 inch Tuohy needle was inserted through the skin wheal and the epidural space was identified using loss of resistance technique . This was confirmed with AP and lateral views using fluoroscopy after injecting about 2 ml of Omnipaque-180 and observing the spread of the contrast in the epidural space. Then after negative aspiration a total of 6 mg of dexamethasone with 3 ml of normal saline was injected into the epidural space. The needle is removed and a Band-Aid was placed over the needle insertion site. No paresthesia. Patient's vital signs remained stable and the patient tolerated the procedure well. The patient will be discharged home in stable condition and will be followed in the office in the next few weeks for further planning.     Electronically signed by Johnathan Rosen MD on 8/1/19 at 2:19 PM Never

## 2019-09-05 NOTE — ED PROVIDER NOTE - ATTENDING CONTRIBUTION TO CARE
Date of Service: 09/05/2019    ONCOLOGY PROGRESS NOTE    SUBJECTIVE:   Justin is seen as an outpatient for further follow-up of his multiple problems.  He was hospitalized at [facility] back in July and August, 2018, for a total of about 2 weeks due to initially an acute left-sided pyelonephritis and Klebsiella sepsis.  He underwent stenting with Dr. Luciano for this by urology.  He completed a course of antibiotics.  His hospital course was complicated by hypoxemic respiratory failure due to CHF and also COPD.  He has acute on chronic systolic and diastolic heart failure.  He also suffered a non-ST elevation myocardial infarction, his stage III chronic kidney disease was stable.  He developed a toxic metabolic encephalopathy with delirium, but that has also improved.    He then went on to complete his radiation therapy for his localized prostate cancer, and was hospitalized again in November 2018, for chronic abdominal pain and radiation-induced proctitis and cystitis. Since then, his symptoms have gradually been subsiding. He still has abdominal pain from his hernia repair and abdominal mesh. His back pain and neuropathy he thinks are worse. He also is having night sweats, his last Lupron was August 2018. He still has very low energy, his diarrhea is stable. His insurance dictated he go on daily Sandostatin and he has had increasing diarrhea and increasing crampy abdominal pain. He denies any chest pain.    PHYSICAL EXAMINATION:  GENERAL:  A pleasant white male who is weak but in no acute distress.  VITAL SIGNS:  His blood pressure 145/85, pulse 68 and regular, respirations 18, not labored, temperature 98.2.  HEENT:  Shows no scleral icterus.  Oropharynx is benign.  NECK:  Supple without adenopathy, thyromegaly.  BACK:  Nontender.  LUNGS:  Clear.  CARDIAC:  Tones show a regular rhythm without murmurs, gallops or rubs.  ABDOMEN:  Soft.  There is no hepatosplenomegaly. There is a well-healed midline  abdominal incision. There is mild distention.  RECTAL:  Deferred.  EXTREMITIES:  No calf tenderness or pedal edema.  NEUROLOGICAL:  The patient is alert and oriented.  There is no focal weakness.    LABORATORY DATA:  Today shows a normal CBC, CMP shows a stable creatinine at 1.34. His PSA is pending. A B12 level is pending.    A CT of the abdomen and pelvis from May 20, 2019 demonstrated renal and bladder stones, some bladder wall thickening, some prominent mesenteric and abdominal lymphadenopathy which is stable, but no other evidence of progressive malignancy. He does have a fatty liver.     ASSESSMENT:  A 70-year-old white male with  prostate cancer that was high-grade. He has completed his radiation and is now been off his Lupron for a year. His PSA has been low, but is pending today. His radiation proctitis and radiation cystitis have become less symptomatic.    He still is on narcotics for his back and abdominal pain. He is also seeing a pain specialist and will be completing a course of injections.    He also is recovering from his nontransmural myocardial infarction. He has had no further chest pain.    His renal failure is stable.    He also is on Octreotide for his neuroendocrine carcinoma and associated carcinoid syndrome, he is now on daily doses dictated by his insurance company. His diarrhea and abdominal cramping are worse.    He also has a B12 deficiency. He is on every three-month replacement for this. His B12 level is pending from today.     He follows with cardiology for his cardiomyopathy diagnosed during his sepsis, but his ejection fraction has now improved to 54% on his most recent echocardiogram with Dr. Humphreys from [facility].    PLAN:  The plan, therefore, is to proceed with his Octreotide injection daily at home. We will increase his dose to 200 µg subcutaneous twice a day. He is due for his next dose of vitamin B12 today. He will continue to follow with cardiology, it appears he is off  anticoagulants other than low-dose aspirin. He will continue on his Norco and his Duragesic. He'll see Apple in 4 weeks for further titration of his octreotide. He will then see Dr. Cano in 3 months for further follow-up and his next dose of B12.     thirty minutes today were spent in consultation, at least 50% of that time was face-to-face with the patient and his wife reviewing the findings and answering their questions.      Dictated By: Gume Felix MD  Signing Provider: Gume Felix MD        Appears well and comfortable. Neurologically intact. Evaluated by NS. Admitted to their service for operative intervention.

## 2019-09-19 NOTE — ED PROVIDER NOTE - CONTACT TIME
Afib   CHF Elongated, painful, toenails Fat pad biopsy KAMRON, Renal Transplant anemia bladder stone fever rectal bleeding weakness. bladder stones 03-Feb-2017 17:22

## 2019-10-05 ENCOUNTER — TRANSCRIPTION ENCOUNTER (OUTPATIENT)
Age: 39
End: 2019-10-05

## 2019-10-11 ENCOUNTER — TRANSCRIPTION ENCOUNTER (OUTPATIENT)
Age: 39
End: 2019-10-11

## 2019-10-16 ENCOUNTER — APPOINTMENT (OUTPATIENT)
Dept: SPINE | Facility: CLINIC | Age: 39
End: 2019-10-16
Payer: MEDICARE

## 2019-10-16 ENCOUNTER — OTHER (OUTPATIENT)
Age: 39
End: 2019-10-16

## 2019-10-16 PROCEDURE — 99213 OFFICE O/P EST LOW 20 MIN: CPT

## 2020-01-27 ENCOUNTER — LABORATORY RESULT (OUTPATIENT)
Age: 40
End: 2020-01-27

## 2020-01-27 ENCOUNTER — APPOINTMENT (OUTPATIENT)
Dept: PEDIATRIC ALLERGY IMMUNOLOGY | Facility: CLINIC | Age: 40
End: 2020-01-27
Payer: MEDICARE

## 2020-01-27 VITALS
HEART RATE: 91 BPM | DIASTOLIC BLOOD PRESSURE: 82 MMHG | OXYGEN SATURATION: 95 % | SYSTOLIC BLOOD PRESSURE: 132 MMHG | BODY MASS INDEX: 31.89 KG/M2 | HEIGHT: 62.99 IN | WEIGHT: 179.99 LBS

## 2020-01-27 DIAGNOSIS — Z91.040 LATEX ALLERGY STATUS: ICD-10-CM

## 2020-01-27 DIAGNOSIS — T78.2XXA TOXIC EFFECT OF VENOM OF OTHER ARTHROPOD, ACCIDENTAL (UNINTENTIONAL), INITIAL ENCOUNTER: ICD-10-CM

## 2020-01-27 DIAGNOSIS — Z13.0 ENCOUNTER FOR SCREENING FOR OTHER SUSPECTED ENDOCRINE DISORDER: ICD-10-CM

## 2020-01-27 DIAGNOSIS — J31.0 CHRONIC RHINITIS: ICD-10-CM

## 2020-01-27 DIAGNOSIS — Z13.228 ENCOUNTER FOR SCREENING FOR OTHER SUSPECTED ENDOCRINE DISORDER: ICD-10-CM

## 2020-01-27 DIAGNOSIS — Z88.0 ALLERGY STATUS TO PENICILLIN: ICD-10-CM

## 2020-01-27 DIAGNOSIS — Z88.1 ALLERGY STATUS TO OTHER ANTIBIOTIC AGENTS: ICD-10-CM

## 2020-01-27 DIAGNOSIS — Q79.62 HYPERMOBILE EHLERS-DANLOS SYNDROME: ICD-10-CM

## 2020-01-27 DIAGNOSIS — Z13.29 ENCOUNTER FOR SCREENING FOR OTHER SUSPECTED ENDOCRINE DISORDER: ICD-10-CM

## 2020-01-27 DIAGNOSIS — T50.905A ADVERSE EFFECT OF UNSPECIFIED DRUGS, MEDICAMENTS AND BIOLOGICAL SUBSTANCES, INITIAL ENCOUNTER: ICD-10-CM

## 2020-01-27 DIAGNOSIS — T63.481A TOXIC EFFECT OF VENOM OF OTHER ARTHROPOD, ACCIDENTAL (UNINTENTIONAL), INITIAL ENCOUNTER: ICD-10-CM

## 2020-01-27 PROCEDURE — 99204 OFFICE O/P NEW MOD 45 MIN: CPT | Mod: GC

## 2020-01-27 PROCEDURE — 36415 COLL VENOUS BLD VENIPUNCTURE: CPT | Mod: GC

## 2020-01-27 RX ORDER — OXYCODONE HYDROCHLORIDE 5 MG/1
CAPSULE ORAL
Refills: 0 | Status: ACTIVE | COMMUNITY

## 2020-01-27 RX ORDER — LEVETIRACETAM 1000 MG/1
1000 TABLET, FILM COATED ORAL TWICE DAILY
Refills: 0 | Status: ACTIVE | COMMUNITY

## 2020-01-27 RX ORDER — MORPHINE SULFATE 200 MG/1
TABLET, EXTENDED RELEASE ORAL
Refills: 0 | Status: ACTIVE | COMMUNITY

## 2020-01-28 PROBLEM — Z88.0 HISTORY OF PENICILLIN ALLERGY: Status: ACTIVE | Noted: 2020-01-28

## 2020-01-28 PROBLEM — Z91.040 ALLERGY TO LATEX: Status: ACTIVE | Noted: 2020-01-28

## 2020-01-28 PROBLEM — Z88.1 ALLERGY TO ANTIBIOTIC: Status: ACTIVE | Noted: 2020-01-28

## 2020-01-28 PROBLEM — T50.905A ADVERSE EFFECT OF DRUG, INITIAL ENCOUNTER: Status: ACTIVE | Noted: 2020-01-28

## 2020-01-28 PROBLEM — Z13.29 SCREENING FOR OTHER AND UNSPECIFIED ENDOCRINE, NUTRITIONAL, METABOLIC AND IMMUNITY DISORDERS: Status: ACTIVE | Noted: 2020-01-27

## 2020-01-28 RX ORDER — DIPHENHYDRAMINE HCL 25 MG/1
25 TABLET ORAL
Qty: 30 | Refills: 1 | Status: ACTIVE | COMMUNITY
Start: 2020-01-28 | End: 1900-01-01

## 2020-01-28 RX ORDER — EPINEPHRINE 0.3 MG/.3ML
0.3 INJECTION INTRAMUSCULAR
Qty: 1 | Refills: 1 | Status: ACTIVE | COMMUNITY
Start: 2020-01-28 | End: 1900-01-01

## 2020-01-29 ENCOUNTER — TRANSCRIPTION ENCOUNTER (OUTPATIENT)
Age: 40
End: 2020-01-29

## 2020-01-29 LAB
A ALTERNATA IGE QN: <0.1 KUA/L
A FUMIGATUS IGE QN: <0.1 KUA/L
AMER BEECH IGE QN: 0
BOXELDER IGE QN: <0.1 KUA/L
C HERBARUM IGE QN: <0.1 KUA/L
C LUNATA IGE QN: <0.1 KUA/L
CAT DANDER IGE QN: <0.1 KUA/L
CMN PIGWEED IGE QN: <0.1 KUA/L
COCKLEBUR IGE QN: <0.1 KUA/L
COCKSFOOT IGE QN: <0.1 KUA/L
COMMON RAGWEED IGE QN: <0.1 KUA/L
COMMON WASP (YELLOW JACKET) CLASS: 0
COMMON WASP (YELLOW JACKET) CONC: <0.1 KUA/L
COTTONWOOD IGE QN: <0.1 KUA/L
D FARINAE IGE QN: <0.1 KUA/L
D PTERONYSS IGE QN: <0.1 KUA/L
DEPRECATED A ALTERNATA IGE RAST QL: 0
DEPRECATED A FUMIGATUS IGE RAST QL: 0
DEPRECATED A PULLULANS IGE RAST QL: 0
DEPRECATED AMER BEECH IGE RAST QL: <0.1 KUA/L
DEPRECATED BOXELDER IGE RAST QL: 0
DEPRECATED C HERBARUM IGE RAST QL: 0
DEPRECATED C LUNATA IGE RAST QL: 0
DEPRECATED CAT DANDER IGE RAST QL: 0
DEPRECATED COCKLEBUR IGE RAST QL: 0
DEPRECATED COCKSFOOT IGE RAST QL: 0
DEPRECATED COMMON PIGWEED IGE RAST QL: 0
DEPRECATED COMMON RAGWEED IGE RAST QL: 0
DEPRECATED COTTONWOOD IGE RAST QL: 0
DEPRECATED D FARINAE IGE RAST QL: 0
DEPRECATED D PTERONYSS IGE RAST QL: 0
DEPRECATED DOG DANDER IGE RAST QL: 0
DEPRECATED ENGL PLANTAIN IGE RAST QL: 0
DEPRECATED F MONILIFORME IGE RAST QL: 0
DEPRECATED GIANT RAGWEED IGE RAST QL: 0
DEPRECATED GOOSE FEATHER IGE RAST QL: 0
DEPRECATED GOOSEFOOT IGE RAST QL: 0
DEPRECATED KENT BLUE GRASS IGE RAST QL: 0
DEPRECATED LONDON PLANE IGE RAST QL: 0
DEPRECATED LTX IGE RAST QL: 0
DEPRECATED M RACEMOSUS IGE RAST QL: 0
DEPRECATED MUGWORT IGE RAST QL: 0
DEPRECATED P NOTATUM IGE RAST QL: 0
DEPRECATED R NIGRICANS IGE RAST QL: 0
DEPRECATED RED CEDAR IGE RAST QL: 0
DEPRECATED RED TOP GRASS IGE RAST QL: 0
DEPRECATED ROACH IGE RAST QL: 0
DEPRECATED RYE IGE RAST QL: 0
DEPRECATED SALTWORT IGE RAST QL: 0
DEPRECATED SILVER BIRCH IGE RAST QL: 0
DEPRECATED TIMOTHY IGE RAST QL: 0
DEPRECATED WHITE ASH IGE RAST QL: 0
DEPRECATED WHITE HICKORY IGE RAST QL: 0
DEPRECATED WHITE OAK IGE RAST QL: 0
DEPRECATED WHITEFACED HORNET IGE RAST QL: 0
DOG DANDER IGE QN: <0.1 KUA/L
ENGL PLANTAIN IGE QN: <0.1 KUA/L
F MONILIFORME IGE QN: <0.1 KUA/L
GIANT RAGWEED IGE QN: <0.1 KUA/L
GOOSE FEATHER IGE QN: <0.1 KUA/L
GOOSEFOOT IGE QN: <0.1 KUA/L
GRAY ALDER (T2) CLASS: 0
GRAY ALDER (T2) CONC: <0.1 KUA/L
HONEY BEE (I1) CLASS: 0
HONEY BEE (I1) CONC: <0.1 KUA/L
KENT BLUE GRASS IGE QN: <0.1 KUA/L
LONDON PLANE IGE QN: <0.1 KUA/L
LTX IGE QN: <0.1 KUA/L
M RACEMOSUS IGE QN: <0.1 KUA/L
MOLD (AUREOBASIDIUM M12) CONC: <0.1 KUA/L
MUGWORT IGE QN: <0.1 KUA/L
MULBERRY (T70) CLASS: 0
MULBERRY (T70) CONC: <0.1 KUA/L
P NOTATUM IGE QN: <0.1 KUA/L
PAPER WASP (I4) CLASS: 0
PAPER WASP (I4) CONC: <0.1 KUA/L
R NIGRICANS IGE QN: <0.1 KUA/L
RED CEDAR IGE QN: <0.1 KUA/L
RED TOP GRASS IGE QN: <0.1 KUA/L
ROACH IGE QN: <0.1 KUA/L
RYE IGE QN: <0.1 KUA/L
SALTWORT IGE QN: <0.1 KUA/L
SILVER BIRCH IGE QN: <0.1 KUA/L
TIMOTHY IGE QN: <0.1 KUA/L
TRYPTASE: 6.3 NG/ML
WHITE ASH IGE QN: <0.1 KUA/L
WHITE ELM IGE QN: 0
WHITE ELM IGE QN: <0.1 KUA/L
WHITE HICKORY IGE QN: <0.1 KUA/L
WHITE OAK IGE QN: <0.1 KUA/L
WHITEFACED HORNET IGE QN: <0.1 KUA/L
YELLOW HORNET (I5) CLASS: 0
YELLOW HORNET (I5) CONC: <0.1 KUA/L

## 2020-01-30 LAB — SEROTONIN SERUM: 407 NG/ML

## 2020-01-30 NOTE — ED ADULT NURSE NOTE - NSSISCREENINGQ3_ED_A_ED
Met w/ patient. Explained role of  and plan of care. Lives w/  in a 2 story house. + R foot ulcer- R leg wrapped- states wears a boot- per podiatry note, pt is to follow up in 1 week as an outpatient. Does not drive-  provides needed transportation. On oral abx. PCP is Dr. Addison Cole and pharmacy is [address] [place] [address]. Per pt, plan is to return home on discharge- states no needs.  [facility] 
No

## 2020-01-31 ENCOUNTER — TRANSCRIPTION ENCOUNTER (OUTPATIENT)
Age: 40
End: 2020-01-31

## 2020-01-31 NOTE — REVIEW OF SYSTEMS
[Diarrhea] : diarrhea [Pruritis] : pruritis [Nl] : Genitourinary [Fever] : no fever [Eye Itching] : no itchy eyes [Puffy Eyelids] : no puffy ~T eyelids [Rhinorrhea] : no rhinorrhea [Nasal Congestion] : no nasal congestion [Nasal Itching] : no nasal itching [Difficulty Breathing] : no dyspnea [SOB at Rest] : no shortness of breath at rest [Cough] : no cough [Vomiting] : no vomiting [Abdominal Pain] : no abdominal pain [Urticaria] : no urticaria [Dry Skin] : no ~L dry skin [Swelling] : no swelling [FreeTextEntry7] : see hpi constipation [FreeTextEntry8] : see hpi intracranial HTN [de-identified] : see hpi

## 2020-01-31 NOTE — SOCIAL HISTORY
[Spouse/Partner] : spouse/partner [House] : [unfilled] lives in a house  [Radiator/Baseboard] : heating provided by radiator(s)/baseboard(s) [Window Units] : air conditioning provided by window units [] :  [Smokers in Household] : there are smokers in the home [Humidifier] : does not use a humidifier [Dehumidifier] : does not use a dehumidifier [Cockroaches] : Patient states that there are no cockroaches in the home [Dust Mite Covers] : does not have dust mite covers [Feather Pillows] : does not have feather pillows [Feather Comforter] : does not have a feather comforter [Bedroom] : not in the bedroom [Basement] : not in the basement [Living Area] : not in the living area

## 2020-01-31 NOTE — HISTORY OF PRESENT ILLNESS
[Food Allergies] : food allergies [de-identified] : 39 year old female with Chiari I malformation s/p multiple surgeries, Su Danlos Syndrome type 3diagnosed in 2017 by Neurosurgeon, POTS, idiopathic intracranial hypertension here for initial evaluation. \par \par In 2019 she has had tethered cord release surgery then 2 weeks later develop infection in the spine. She returned to hospital for treatment. During the hospitalization, it was suspected that she had mast cell disorder after having intermittent rashes. Had a PICC line and sent home with 8 weeks of abx. \par \par Patient states that she gets intermittent red itchy rashes and sometimes does not realize that she has them until they become itchy. Has had prior reactions to medications and adhesives. She does not think these rashes are hives. She has not tried po antihistamines for the rash.\par \par PMH: \par Diagnosed with POTS by cardiologist (Dr. Velazquez) after tilt test in 2018. \par Recently diagnosed with idiopathic ICH, going to have shunt placement soon. \par Per patient she has lots of digestive issues. Gets intermittent constipation and diarrhea. Has not seen a GI specialist. \par \par Allergies: \par Latex allergy a few years ago developed erythema to the size of the bandage. \par Anaphylactic reaction to bee sting about 3 years ago. \par Had difficulty breathing after taking Keflex in 2016. As a kid was given penicillin and developed throat swelling. \par In November she did ICP bolt test and she developed hives following it. \par Has epi pen but no anaphylaxis action plan\par \par Asthma:\par Has history of asthma managed by family doctor. Is a smoker. Taking albuterol as needed, never on an asthma controller. Last albuterol use was many months ago. She currently denies wheezing, nocturnal complaints or exercise intolerance.\par \par No history of food allergies.

## 2020-02-05 ENCOUNTER — TRANSCRIPTION ENCOUNTER (OUTPATIENT)
Age: 40
End: 2020-02-05

## 2020-05-17 ENCOUNTER — TRANSCRIPTION ENCOUNTER (OUTPATIENT)
Age: 40
End: 2020-05-17

## 2020-06-26 NOTE — PHYSICAL THERAPY INITIAL EVALUATION ADULT - ASSISTIVE DEVICE FOR TRANSFER: SIT/STAND, REHAB EVAL
reports upper abd pain that is post-prandial and radiates down to the LLQ, worse with eating better with nothing, reports mild weight loss of a few pounds, duration 3 months, severe, sharp, worsening 
rolling walker

## 2020-09-02 ENCOUNTER — APPOINTMENT (OUTPATIENT)
Dept: SPINE | Facility: CLINIC | Age: 40
End: 2020-09-02
Payer: MEDICARE

## 2020-09-02 VITALS — DIASTOLIC BLOOD PRESSURE: 95 MMHG | SYSTOLIC BLOOD PRESSURE: 145 MMHG

## 2020-09-02 DIAGNOSIS — M53.2X1 SPINAL INSTABILITIES, OCCIPITO-ATLANTO-AXIAL REGION: ICD-10-CM

## 2020-09-02 PROCEDURE — 99212 OFFICE O/P EST SF 10 MIN: CPT

## 2020-09-02 RX ORDER — LACOSAMIDE 100 MG/1
100 TABLET, FILM COATED ORAL
Refills: 0 | Status: ACTIVE | COMMUNITY

## 2020-09-03 NOTE — REASON FOR VISIT
Prevention Guidelines, Women Ages 50 to 64  Screening tests and vaccines are an important part of managing your health. Health counseling is essential, too. Below are guidelines for these, for women ages 50 to 64. Talk with your healthcare provider to make sure youre up to date on what you need.  Screening Who needs it How often   Type 2 diabetes or prediabetes All adults beginning at age 45 and adults without symptoms at any age who are overweight or obese and have 1 or more additional risk factors for diabetes. At  least every 3 years   Alcohol misuse All women in this age group At routine exams   Blood pressure All women in this age group Every 2 years if your blood pressure is less than 120/80 mm Hg; yearly if your systolic blood pressure is 120 to 139 mm Hg, or your diastolic blood pressure reading is 80 to 89 mm Hg   Breast cancer All women in this age group Yearly mammogram and clinical breast exam1   Cervical cancer All women in this age group, except women who have had a complete hysterectomy Pap test every 3 years or Pap test with human papillomavirus (HPV) test every 5 years   Chlamydia Women at increased risk for infection At routine exams   Colorectal cancer All women in this age group Flexible sigmoidoscopy every 5 years, or colonoscopy every 10 years, or double-contrast barium enema every 5 years; yearly fecal occult blood test or fecal immunochemical test; or a stool DNA test as often as your health care provider advises; talk with your health care provider about which tests are best for you   Depression All women in this age group At routine exams   Gonorrhea Sexually active women at increased risk for infection At routine exams   Hepatitis C Anyone at increased risk; 1 time for those born between 1945 and 1965 At routine exams   High cholesterol or triglycerides All women in this age group who are at risk for coronary artery disease At least every 5 years   HIV All women At routine exams   Lung  cancer Adults age 55 to 80 who have smoked Yearly screening in smokers with 30 pack-year history of smoking or who quit within 15 years   Obesity All women in this age group At routine exams   Osteoporosis Women who are postmenopausal Ask your healthcare provider   Syphilis Women at increased risk for infection - talk with your healthcare provider At routine exams   Tuberculosis Women at increased risk for infection - talk with your healthcare provider Ask your healthcare provider   Vision All women in this age group Ask your healthcare provider   Vaccine Who needs it How often   Chickenpox (varicella) All women in this age group who have no record of this infection or vaccine 2 doses; the second dose should be given at least 4 weeks after the first dose   Hepatitis A Women at increased risk for infection - talk with your healthcare provider 2 doses given at least 6 months apart   Hepatitis B Women at increased risk for infection - talk with your healthcare provider 3 doses over 6 months; second dose should be given 1 month after the first dose; the third dose should be given at least 2 months after the second dose and at least 4 months after the first dose   Haemophilus influenzaeType B (HIB) Women at increased risk for infection - talk with your healthcare provider 1 to 3 doses   Influenza (flu) All women in this age group Once a year   Measles, mumps, rubella (MMR) Women in this age group through their late 50s who have no record of these infections or vaccines 1 dose   Meningococcal Women at increased risk for infection - talk with your healthcare provider 1 or more doses   Pneumococcal conjugate vaccine (PCV13) and pneumococcal polysaccharide vaccine (PPSV23) Women at increased risk for infection - talk with your healthcare provider PCV13: 1 dose ages 19 to 65 (protects against 13 types of pneumococcal bacteria)  PPSV23: 1 to 2 doses through age 64, or 1 dose at 65 or older (protects against 23 types of  pneumococcal bacteria)   Tetanus/diphtheria/pertussis (Td/Tdap) booster All women in this age group Td every 10 years, or a one-time dose of Tdap instead of a Td booster after age 18, then Td every 10 years   Zoster All women ages 60 and older 1 dose   Counseling Who needs it How often   BRCA gene mutation testing for breast and ovarian cancer susceptibility Women with increased risk for having gene mutation When your risk is known   Breast cancer and chemoprevention Women at high risk for breast cancer When your risk is known   Diet and exercise Women who are overweight or obese When diagnosed, and then at routine exams   Sexually transmitted infection prevention Women at increased risk for infection - talk with your healthcare provider At routine exams   Use of daily aspirin Women ages 55 and up in this age group who are at risk for cardiovascular health problems such as stroke When your risk is known   Use of tobacco and the health effects it can cause All women in this age group Every exam   1American [employer]  Date Last Reviewed: 1/26/2016  © 1562-3138 [employer], [employer]. [address] [place] [address], [place] [address]. All rights reserved. This information is not intended as a substitute for professional medical care. Always follow your healthcare professional's instructions.         [Follow-Up: _____] : a [unfilled] follow-up visit [FreeTextEntry1] : S/P  shunt placement 2/5/20 for intracranial HTN By Dr. Diego at [facility].\par She is here to get Dr. Murray's  opinion regarding her recovery progress after  shunt placement.\par \par She is doing well after Thoracic Surgery with Dr. Murray\par \par She reports that she has been experiencing seizures and other neurological issues since her  shunt placement by Dr. Diego.\par

## 2020-09-03 NOTE — ASSESSMENT
[FreeTextEntry1] : Follow-up with Dr. Duval for comprehensive logical evaluation and seizure management.\par \par Follow-up as needed with Dr. Murray\par

## 2020-09-03 NOTE — PHYSICAL EXAM
[General Appearance - Alert] : alert [General Appearance - In No Acute Distress] : in no acute distress [Oriented To Time, Place, And Person] : oriented to person, place, and time [Impaired Insight] : insight and judgment were intact [Cranial Nerves Optic (II)] : visual acuity intact bilaterally,  pupils equal round and reactive to light [Cranial Nerves Oculomotor (III)] : extraocular motion intact [Cranial Nerves Trigeminal (V)] : facial sensation intact symmetrically [Cranial Nerves Facial (VII)] : face symmetrical [Cranial Nerves Glossopharyngeal (IX)] : tongue and palate midline [Cranial Nerves Vestibulocochlear (VIII)] : hearing was intact bilaterally [Cranial Nerves Accessory (XI - Cranial And Spinal)] : head turning and shoulder shrug symmetric [Cranial Nerves Hypoglossal (XII)] : there was no tongue deviation with protrusion [Motor Tone] : muscle tone was normal in all four extremities [Motor Strength] : muscle strength was normal in all four extremities [] : no respiratory distress [FreeTextEntry1] : Ambulates with cane

## 2020-10-22 NOTE — ED ADULT NURSE NOTE - NS ED NOTE ABUSE SUSPICION NEGLECT YN
Individual Psychotherapy (PhD/LCSW)    10/22/2020    Site:  [facility]  [facility] [place]          Therapeutic Intervention: Met with patient and mother.  Outpatient - Supportive psychotherapy 45 min - CPT Code 95989 and Outpatient - Interactive psychotherapy 45 min - CPT code 59747    Chief complaint/reason for encounter: anxiety and behavior     Interval history and content of current session: Reviewed chart. Completed in clinic visit with Tonja and her mother.  Continuing to have meltdowns and tantrums (only with Mom and brother Neno).  Tonja uses ugly words and kicks and screams.  Encouraged Mom to up the consequences. Ugly words earns a bite on a bar of soap or losing diffuser and stars.  Encouraged a plastic bat to allow Miss Mean to come and smack a pillow.  Used Trouble Zone to show Tonja what happens when she can't control Miss Mean (behavior).  Reinforced all feelings are OK but some behavior is NOT ok. Continue using behavior charts.  Return as scheduled.  Treatment plan:  · Target symptoms: distractability, anxiety   · Why chosen therapy is appropriate versus another modality: relevant to diagnosis, patient responds to this modality, evidence based practice  · Outcome monitoring methods: self-report, observation, feedback from family  · Therapeutic intervention type: behavior modifying psychotherapy, supportive psychotherapy, interactive psychotherapy    Risk parameters:  Patient reports no suicidal ideation  Patient reports no homicidal ideation  Patient reports no self-injurious behavior  Patient reports no violent behavior    Verbal deficits: None    Patient's response to intervention:  The patient's response to intervention is accepting.    Progress toward goals and other mental status changes:  The patient's progress toward goals is good.    Diagnosis:   No diagnosis found.    Plan:  individual psychotherapy Pt to go to ED or call 911 if  symptoms worsen or if she has thoughts of harming self and/or others. Pt verbalized understanding.    Return to clinic: 1 week    Length of Service (minutes): 45 minutes       No

## 2021-01-06 NOTE — ED PROVIDER NOTE - CROS ED SKIN ALL NEG
Anesthesia Pre Eval Note    Anesthesia ROS/Med Hx        Anesthetic Complication History:  Patient does not have a history of anesthetic complications      Pulmonary Review:  Patient does not have a pulmonary history      Neuro/Psych Review:  Patient does not have a neuro/psych history       Cardiovascular Review:  Patient does not have a cardiovascular history   Exercise tolerance: good (>4 METS)    GI/HEPATIC/RENAL Review:  Patient does not have a GI/hepatic/renalhistory       End/Other Review:    Positive for arthritis      Relevant Problems   No relevant active problems       Physical Exam     Airway   Mallampati: I  TM Distance: >3 FB  Neck ROM: Full  Neck: Able to place in sniff position    Cardiovascular    Cardio Rhythm: Regular  Cardio Rate: Normal    Head Assessment  Head assessment: Normocephalic    General Assessment  General Assessment: Alert and oriented    Dental Exam    Patient has:  Upper dentures    Legend: C=Chipped  M=Missing  L=Loose B=Bridge Cr=[employer]    Pulmonary Exam  Pulmonary exam normal  Breath sounds clear to auscultation:  Yes    Abdominal Exam  Abdominal exam normal      Anesthesia Plan    ASA Status: 1    Anesthesia Type: General    Induction: Intravenous  Preferred Airway Type: LMA    Checklist  Reviewed: Lab Results, Past Med History, Allergies, Care Everywhere, Patient Summary, Medications, Problem list and NPO Status    Informed Consent  The proposed anesthetic plan, including its risks and benefits, have been discussed with the Patient  - along with the risks and benefits of alternatives.  Questions were encouraged and answered and the patient and/or representative understands and agrees to proceed.     Blood Products: Not Anticipated    
negative...

## 2021-05-10 NOTE — PHYSICAL THERAPY INITIAL EVALUATION ADULT - LEVEL OF INDEPENDENCE: STAND/SIT, REHAB EVAL
From: Heriberto Lima  To: Madelyn Shore PA-C  Sent: 5/10/2021 3:59 PM CDT  Subject: Other    Hello,    I have attached my updated vaccination card, please update my records    Thank you,  Gretchen Toro
independent

## 2021-05-21 ENCOUNTER — TRANSCRIPTION ENCOUNTER (OUTPATIENT)
Age: 41
End: 2021-05-21

## 2021-07-23 NOTE — ED ADULT TRIAGE NOTE - HEIGHT IN INCHES
3 Topical Clindamycin Counseling: Patient counseled that this medication may cause skin irritation or allergic reactions.  In the event of skin irritation, the patient was advised to reduce the amount of the drug applied or use it less frequently.   The patient verbalized understanding of the proper use and possible adverse effects of clindamycin.  All of the patient's questions and concerns were addressed.

## 2022-01-18 ENCOUNTER — APPOINTMENT (OUTPATIENT)
Dept: PULMONOLOGY | Facility: CLINIC | Age: 42
End: 2022-01-18
Payer: MEDICARE

## 2022-01-18 VITALS
OXYGEN SATURATION: 98 % | DIASTOLIC BLOOD PRESSURE: 84 MMHG | BODY MASS INDEX: 34.02 KG/M2 | SYSTOLIC BLOOD PRESSURE: 118 MMHG | HEART RATE: 86 BPM | RESPIRATION RATE: 16 BRPM | WEIGHT: 192 LBS

## 2022-01-18 DIAGNOSIS — E66.9 OBESITY, UNSPECIFIED: ICD-10-CM

## 2022-01-18 DIAGNOSIS — G93.5 COMPRESSION OF BRAIN: ICD-10-CM

## 2022-01-18 DIAGNOSIS — G47.33 OBSTRUCTIVE SLEEP APNEA (ADULT) (PEDIATRIC): ICD-10-CM

## 2022-01-18 DIAGNOSIS — Z86.69 PERSONAL HISTORY OF OTHER DISEASES OF THE NERVOUS SYSTEM AND SENSE ORGANS: ICD-10-CM

## 2022-01-18 DIAGNOSIS — J45.909 UNSPECIFIED ASTHMA, UNCOMPLICATED: ICD-10-CM

## 2022-01-18 DIAGNOSIS — Z23 ENCOUNTER FOR IMMUNIZATION: ICD-10-CM

## 2022-01-18 DIAGNOSIS — K21.9 GASTRO-ESOPHAGEAL REFLUX DISEASE W/OUT ESOPHAGITIS: ICD-10-CM

## 2022-01-18 PROCEDURE — 99203 OFFICE O/P NEW LOW 30 MIN: CPT

## 2022-01-18 RX ORDER — DULOXETINE HYDROCHLORIDE 30 MG/1
30 CAPSULE, DELAYED RELEASE ORAL
Refills: 0 | Status: DISCONTINUED | COMMUNITY
End: 2022-01-18

## 2022-01-18 RX ORDER — ONDANSETRON HCL 24 MG
24 TABLET ORAL
Refills: 0 | Status: ACTIVE | COMMUNITY

## 2022-01-18 RX ORDER — MIRABEGRON 50 MG/1
50 TABLET, FILM COATED, EXTENDED RELEASE ORAL
Refills: 0 | Status: ACTIVE | COMMUNITY

## 2022-01-18 RX ORDER — ALBUTEROL SULFATE 90 UG/1
108 (90 BASE) INHALANT RESPIRATORY (INHALATION)
Qty: 3 | Refills: 2 | Status: ACTIVE | COMMUNITY
Start: 2022-01-18 | End: 1900-01-01

## 2022-01-18 NOTE — CONSULT LETTER
[Dear  ___] : Dear  [unfilled], [Consult Letter:] : I had the pleasure of evaluating your patient, [unfilled]. [Please see my note below.] : Please see my note below. [Consult Closing:] : Thank you very much for allowing me to participate in the care of this patient.  If you have any questions, please do not hesitate to contact me. [Sincerely,] : Sincerely, [DrIssac  ___] : Dr. GARSIA

## 2022-01-24 ENCOUNTER — APPOINTMENT (OUTPATIENT)
Age: 42
End: 2022-01-24

## 2022-01-27 ENCOUNTER — OUTPATIENT (OUTPATIENT)
Dept: OUTPATIENT SERVICES | Facility: HOSPITAL | Age: 42
LOS: 1 days | End: 2022-01-27
Payer: MEDICARE

## 2022-01-27 ENCOUNTER — APPOINTMENT (OUTPATIENT)
Age: 42
End: 2022-01-27
Payer: MEDICARE

## 2022-01-27 DIAGNOSIS — Z98.890 OTHER SPECIFIED POSTPROCEDURAL STATES: Chronic | ICD-10-CM

## 2022-01-27 DIAGNOSIS — Z98.89 OTHER SPECIFIED POSTPROCEDURAL STATES: Chronic | ICD-10-CM

## 2022-01-27 DIAGNOSIS — G47.33 OBSTRUCTIVE SLEEP APNEA (ADULT) (PEDIATRIC): ICD-10-CM

## 2022-01-27 PROCEDURE — 95800 SLP STDY UNATTENDED: CPT

## 2022-02-17 ENCOUNTER — APPOINTMENT (OUTPATIENT)
Dept: NEUROSURGERY | Facility: CLINIC | Age: 42
End: 2022-02-17
Payer: MEDICARE

## 2022-02-17 VITALS
SYSTOLIC BLOOD PRESSURE: 114 MMHG | TEMPERATURE: 98.6 F | HEART RATE: 88 BPM | BODY MASS INDEX: 31.18 KG/M2 | OXYGEN SATURATION: 99 % | DIASTOLIC BLOOD PRESSURE: 80 MMHG | HEIGHT: 63 IN | WEIGHT: 176 LBS

## 2022-02-17 PROCEDURE — 99213 OFFICE O/P EST LOW 20 MIN: CPT

## 2022-02-17 RX ORDER — METOPROLOL SUCCINATE 200 MG/1
TABLET, EXTENDED RELEASE ORAL
Refills: 0 | Status: DISCONTINUED | COMMUNITY
End: 2022-02-17

## 2022-02-17 NOTE — PHYSICAL EXAM
[General Appearance - Alert] : alert [General Appearance - In No Acute Distress] : in no acute distress [General Appearance - Well-Appearing] : healthy appearing [Oriented To Time, Place, And Person] : oriented to person, place, and time [Person] : oriented to person [Place] : oriented to place [Time] : oriented to time [Motor Tone] : muscle tone was normal in all four extremities [Sclera] : the sclera and conjunctiva were normal [Outer Ear] : the ears and nose were normal in appearance [Neck Appearance] : the appearance of the neck was normal [] : no respiratory distress [Heart Rate And Rhythm] : heart rate was normal and rhythm regular [Abnormal Walk] : normal gait [Skin Color & Pigmentation] : normal skin color and pigmentation

## 2022-02-17 NOTE — ASSESSMENT
[FreeTextEntry1] : Impression:\par IIH with intolerance to medical management\par  shunt removed secondary to infection\par Severe positional headaches\par + blurry vision, ? papilledema on last eye exam\par \par I recommended work-up for venous stenosis, intracranial and extracranial to assess whether intracranial is secondary to venous insufficiency.\par \par PLAN\par CTA Head and Neck w/wo (Include Venous Phase)\par Follow Up with Me After Imaging

## 2022-02-17 NOTE — REVIEW OF SYSTEMS
[Feeling Poorly] : feeling poorly [Feeling Tired] : feeling tired [Seizures] : convulsions [As Noted in HPI] : as noted in HPI [Negative] : Heme/Lymph [de-identified] : Headaches

## 2022-02-17 NOTE — HISTORY OF PRESENT ILLNESS
[de-identified] : Ms. MCDOWELL is a pleasant 41 year old female with a PMH of EDS, seizures, chiari malformation, CCI and tethered cord who presents today with a chief complaint of idiopathic intracranial hypertension.  She was diagnosed in 2019 during work up of severe headaches.\par \par 11/2019 LP with opening pressure of 32cm H2O\par 11/2020 LP with opening pressure of 28cm H2O (with functioning  shunt)\par \par Currently:\par \par Meds - Diamox could not tolerate due to increased seizure activity, On Topamax\par Pulsatile Tinnitus - Bilateral (R>L) ear, constant, very bothersome\par Headaches - positional, worse with laying flat and in the morning\par Vision - + blurry vision, no diplopia or TVOs; Last ophthalmology exam was 8/2021 (she is unsure if she had papilledema but was offered ONSF)\par \par Pertinent Surgical History:\par T12 Removal and Thoracic Fusion 5/2017 ([facility])\par Cranial Cervical Fusion for CCI 2/2018 ([facility])\par Tethered Cord Release 10/2018 ([facility] [place])\par 2/2020  Shunt Insertion\par 11/2021  Shunt Removal for Infection ([facility] [place])\par \par Symptoms are progressively worse since shunt removal.\par

## 2022-03-01 ENCOUNTER — OUTPATIENT (OUTPATIENT)
Dept: OUTPATIENT SERVICES | Facility: HOSPITAL | Age: 42
LOS: 1 days | End: 2022-03-01
Payer: MEDICARE

## 2022-03-01 ENCOUNTER — APPOINTMENT (OUTPATIENT)
Dept: CT IMAGING | Facility: CLINIC | Age: 42
End: 2022-03-01
Payer: MEDICARE

## 2022-03-01 DIAGNOSIS — Z98.890 OTHER SPECIFIED POSTPROCEDURAL STATES: Chronic | ICD-10-CM

## 2022-03-01 DIAGNOSIS — Z98.89 OTHER SPECIFIED POSTPROCEDURAL STATES: Chronic | ICD-10-CM

## 2022-03-01 DIAGNOSIS — Z00.8 ENCOUNTER FOR OTHER GENERAL EXAMINATION: ICD-10-CM

## 2022-03-01 DIAGNOSIS — G93.2 BENIGN INTRACRANIAL HYPERTENSION: ICD-10-CM

## 2022-03-01 PROCEDURE — G1004: CPT

## 2022-03-01 PROCEDURE — 70498 CT ANGIOGRAPHY NECK: CPT | Mod: 26,MF

## 2022-03-01 PROCEDURE — 70498 CT ANGIOGRAPHY NECK: CPT | Mod: MF

## 2022-03-01 PROCEDURE — 70496 CT ANGIOGRAPHY HEAD: CPT | Mod: ME

## 2022-03-01 PROCEDURE — 70496 CT ANGIOGRAPHY HEAD: CPT | Mod: 26,ME

## 2022-03-10 ENCOUNTER — APPOINTMENT (OUTPATIENT)
Dept: NEUROSURGERY | Facility: CLINIC | Age: 42
End: 2022-03-10
Payer: MEDICARE

## 2022-03-10 DIAGNOSIS — G93.2 BENIGN INTRACRANIAL HYPERTENSION: ICD-10-CM

## 2022-03-10 PROCEDURE — 99213 OFFICE O/P EST LOW 20 MIN: CPT | Mod: 95

## 2022-03-14 NOTE — HISTORY OF PRESENT ILLNESS
[TextBox_4] : Obese female with Chiari Malformation noted to snore and have poor sleep\par H/O wheeze/ asthma\par Smoked 1 PPD X 13 years\par Still smoking 1/4 PPD\par +Heartburn\par Poor sleep lowers her seizure threshold

## 2022-03-14 NOTE — PHYSICAL EXAM
[No Acute Distress] : no acute distress [Low Lying Soft Palate] : low lying soft palate [Enlarged Base of the Tongue] : enlarged base of the tongue [II] : Mallampati Class: II [Normal Appearance] : normal appearance [Neck Circumference: ___] : neck circumference: [unfilled] [No Neck Mass] : no neck mass [Normal Rate/Rhythm] : normal rate/rhythm [Normal S1, S2] : normal s1, s2 [No Resp Distress] : no resp distress [Clear to Auscultation Bilaterally] : clear to auscultation bilaterally [TextBox_2] : obese

## 2022-03-15 ENCOUNTER — APPOINTMENT (OUTPATIENT)
Dept: PULMONOLOGY | Facility: CLINIC | Age: 42
End: 2022-03-15

## 2022-03-15 NOTE — ASSESSMENT
[FreeTextEntry1] : Impression:\par IIH with intolerance to medical management\par  shunt removed secondary to infection\par Severe positional headaches\par + blurry vision, ? papilledema on last eye exam\par \par CTA Head and Neck w/wo reveals severe stenosis of the dominant right transverse sinus and mild-moderate stenosis of the right internal jugular vein; no carotid dissection or stenosis\par \par PLAN\par Schedule Catheter Cerebral Angiogram/Venogram

## 2022-03-15 NOTE — HISTORY OF PRESENT ILLNESS
[de-identified] : Ms. MCDOWELL is a pleasant 41 year old female with a PMH of EDS, seizures, chiari malformation, CCI and tethered cord who presents today for follow up of idiopathic intracranial hypertension and CTA review.  She was diagnosed in 2019 during work up of severe headaches.\par \par 11/2019 LP with opening pressure of 32cm H2O\par 11/2020 LP with opening pressure of 28cm H2O (with functioning  shunt)\par \par Currently:\par \par Meds - Diamox could not tolerate due to increased seizure activity, On Topamax\par Pulsatile Tinnitus - Bilateral (R>L) ear, constant, very bothersome\par Headaches - positional, worse with laying flat and in the morning\par Vision - + blurry vision, no diplopia or TVOs; Last ophthalmology exam was 8/2021 (she is unsure if she had papilledema but was offered ONSF)\par \par Pertinent Surgical History:\par T12 Removal and Thoracic Fusion 5/2017 ([facility])\par Cranial Cervical Fusion for CCI 2/2018 ([facility])\par Tethered Cord Release 10/2018 [place]par 2/2020  Shunt Insertion\par 11/2021  Shunt Removal for Infection ([facility])\par \par Symptoms are progressively worse since shunt removal.\par

## 2022-04-06 ENCOUNTER — TRANSCRIPTION ENCOUNTER (OUTPATIENT)
Age: 42
End: 2022-04-06

## 2022-04-11 ENCOUNTER — NON-APPOINTMENT (OUTPATIENT)
Age: 42
End: 2022-04-11

## 2022-04-29 ENCOUNTER — OUTPATIENT (OUTPATIENT)
Dept: OUTPATIENT SERVICES | Facility: HOSPITAL | Age: 42
LOS: 1 days | End: 2022-04-29
Payer: MEDICARE

## 2022-04-29 VITALS
RESPIRATION RATE: 16 BRPM | OXYGEN SATURATION: 97 % | DIASTOLIC BLOOD PRESSURE: 84 MMHG | TEMPERATURE: 99 F | SYSTOLIC BLOOD PRESSURE: 124 MMHG | HEIGHT: 63 IN | WEIGHT: 179.02 LBS | HEART RATE: 84 BPM

## 2022-04-29 DIAGNOSIS — Z98.89 OTHER SPECIFIED POSTPROCEDURAL STATES: Chronic | ICD-10-CM

## 2022-04-29 DIAGNOSIS — Z98.890 OTHER SPECIFIED POSTPROCEDURAL STATES: Chronic | ICD-10-CM

## 2022-04-29 DIAGNOSIS — Q06.8 OTHER SPECIFIED CONGENITAL MALFORMATIONS OF SPINAL CORD: Chronic | ICD-10-CM

## 2022-04-29 DIAGNOSIS — Z01.818 ENCOUNTER FOR OTHER PREPROCEDURAL EXAMINATION: ICD-10-CM

## 2022-04-29 DIAGNOSIS — G40.909 EPILEPSY, UNSPECIFIED, NOT INTRACTABLE, WITHOUT STATUS EPILEPTICUS: ICD-10-CM

## 2022-04-29 DIAGNOSIS — G93.2 BENIGN INTRACRANIAL HYPERTENSION: ICD-10-CM

## 2022-04-29 DIAGNOSIS — Z11.52 ENCOUNTER FOR SCREENING FOR COVID-19: ICD-10-CM

## 2022-04-29 DIAGNOSIS — Z98.2 PRESENCE OF CEREBROSPINAL FLUID DRAINAGE DEVICE: Chronic | ICD-10-CM

## 2022-04-29 LAB
ANION GAP SERPL CALC-SCNC: 12 MMOL/L — SIGNIFICANT CHANGE UP (ref 5–17)
APTT BLD: 33.7 SEC — SIGNIFICANT CHANGE UP (ref 27.5–35.5)
BLD GP AB SCN SERPL QL: NEGATIVE — SIGNIFICANT CHANGE UP
BUN SERPL-MCNC: 13 MG/DL — SIGNIFICANT CHANGE UP (ref 7–23)
CALCIUM SERPL-MCNC: 9.5 MG/DL — SIGNIFICANT CHANGE UP (ref 8.4–10.5)
CHLORIDE SERPL-SCNC: 106 MMOL/L — SIGNIFICANT CHANGE UP (ref 96–108)
CO2 SERPL-SCNC: 21 MMOL/L — LOW (ref 22–31)
CREAT SERPL-MCNC: 0.66 MG/DL — SIGNIFICANT CHANGE UP (ref 0.5–1.3)
EGFR: 112 ML/MIN/1.73M2 — SIGNIFICANT CHANGE UP
GLUCOSE SERPL-MCNC: 81 MG/DL — SIGNIFICANT CHANGE UP (ref 70–99)
HCT VFR BLD CALC: 46.4 % — HIGH (ref 34.5–45)
HGB BLD-MCNC: 14.9 G/DL — SIGNIFICANT CHANGE UP (ref 11.5–15.5)
INR BLD: 1.05 RATIO — SIGNIFICANT CHANGE UP (ref 0.88–1.16)
MCHC RBC-ENTMCNC: 30 PG — SIGNIFICANT CHANGE UP (ref 27–34)
MCHC RBC-ENTMCNC: 32.1 GM/DL — SIGNIFICANT CHANGE UP (ref 32–36)
MCV RBC AUTO: 93.5 FL — SIGNIFICANT CHANGE UP (ref 80–100)
NRBC # BLD: 0 /100 WBCS — SIGNIFICANT CHANGE UP (ref 0–0)
PLATELET # BLD AUTO: 227 K/UL — SIGNIFICANT CHANGE UP (ref 150–400)
POTASSIUM SERPL-MCNC: 4.2 MMOL/L — SIGNIFICANT CHANGE UP (ref 3.5–5.3)
POTASSIUM SERPL-SCNC: 4.2 MMOL/L — SIGNIFICANT CHANGE UP (ref 3.5–5.3)
PROTHROM AB SERPL-ACNC: 12.2 SEC — SIGNIFICANT CHANGE UP (ref 10.5–13.4)
RBC # BLD: 4.96 M/UL — SIGNIFICANT CHANGE UP (ref 3.8–5.2)
RBC # FLD: 12.8 % — SIGNIFICANT CHANGE UP (ref 10.3–14.5)
RH IG SCN BLD-IMP: POSITIVE — SIGNIFICANT CHANGE UP
SARS-COV-2 RNA SPEC QL NAA+PROBE: SIGNIFICANT CHANGE UP
SODIUM SERPL-SCNC: 139 MMOL/L — SIGNIFICANT CHANGE UP (ref 135–145)
WBC # BLD: 10.54 K/UL — HIGH (ref 3.8–10.5)
WBC # FLD AUTO: 10.54 K/UL — HIGH (ref 3.8–10.5)

## 2022-04-29 PROCEDURE — G0463: CPT

## 2022-04-29 PROCEDURE — 86900 BLOOD TYPING SEROLOGIC ABO: CPT

## 2022-04-29 PROCEDURE — 36415 COLL VENOUS BLD VENIPUNCTURE: CPT

## 2022-04-29 PROCEDURE — 80048 BASIC METABOLIC PNL TOTAL CA: CPT

## 2022-04-29 PROCEDURE — U0005: CPT

## 2022-04-29 PROCEDURE — 85610 PROTHROMBIN TIME: CPT

## 2022-04-29 PROCEDURE — 86901 BLOOD TYPING SEROLOGIC RH(D): CPT

## 2022-04-29 PROCEDURE — U0003: CPT

## 2022-04-29 PROCEDURE — 86850 RBC ANTIBODY SCREEN: CPT

## 2022-04-29 PROCEDURE — C9803: CPT

## 2022-04-29 PROCEDURE — 85027 COMPLETE CBC AUTOMATED: CPT

## 2022-04-29 PROCEDURE — 85730 THROMBOPLASTIN TIME PARTIAL: CPT

## 2022-04-29 RX ORDER — METOPROLOL TARTRATE 50 MG
0.5 TABLET ORAL
Qty: 0 | Refills: 0 | DISCHARGE

## 2022-04-29 RX ORDER — CYCLOBENZAPRINE HYDROCHLORIDE 10 MG/1
1 TABLET, FILM COATED ORAL
Qty: 0 | Refills: 0 | DISCHARGE

## 2022-04-29 RX ORDER — ARIPIPRAZOLE 15 MG/1
1 TABLET ORAL
Qty: 0 | Refills: 0 | DISCHARGE

## 2022-04-29 RX ORDER — TOPIRAMATE 25 MG
1 TABLET ORAL
Qty: 0 | Refills: 0 | DISCHARGE

## 2022-04-29 RX ORDER — LEVETIRACETAM 250 MG/1
1 TABLET, FILM COATED ORAL
Qty: 0 | Refills: 0 | DISCHARGE

## 2022-04-29 RX ORDER — HYDROMORPHONE HYDROCHLORIDE 2 MG/ML
1 INJECTION INTRAMUSCULAR; INTRAVENOUS; SUBCUTANEOUS
Qty: 0 | Refills: 0 | DISCHARGE

## 2022-04-29 RX ORDER — DULOXETINE HYDROCHLORIDE 30 MG/1
1 CAPSULE, DELAYED RELEASE ORAL
Qty: 0 | Refills: 0 | DISCHARGE

## 2022-04-29 NOTE — H&P PST ADULT - NSICDXFAMILYHX_GEN_ALL_CORE_FT
FAMILY HISTORY:  Family history of drug abuse  Family history of hepatitis C    Sibling  Still living? Yes, Estimated age: 41  Family history of coronary artery disease, Age at diagnosis: Age Unknown

## 2022-04-29 NOTE — H&P PST ADULT - NSICDXPASTSURGICALHX_GEN_ALL_CORE_FT
PAST SURGICAL HISTORY:  H/O brain surgery     H/O cervical spine surgery     H/O craniotomy 16 suboccipital crani for C1 arch resection w posterior occipital cervical hardware fusion to C2 , was revised 18.    H/O lumbosacral spine surgery     H/O:      History of back surgery 2017--  T12 vertebral column resection, T8-L2 instrumentation and fusion.    History of cholecystectomy laparoscopic     History of tonsillectomy age 18    S/P lumbar laminectomy , complicated with CSF leak, S/P blood patch    S/P myelogram c/b CSF leak, spinal headache, S/P blood patch    S/P  shunt placed  removed 2021 due to infection    Tethered spinal cord release  [facility]

## 2022-04-29 NOTE — H&P PST ADULT - HISTORY OF PRESENT ILLNESS
42F with a pmhx significant for seizures, asthma, current smoker, chiari malformation, Su-Danlos syndrome chronic headaches and LBP, tethered spinal cord s/p release 2018 and idiopathic intracranial hypertension diagnosed 2019.  Pt in PST today in preparation for cerebral venogram scheduled on 5/2/2022      ,Covid swab test done today 4/29/22

## 2022-04-29 NOTE — H&P PST ADULT - FALL HARM RISK - RISK INTERVENTIONS

## 2022-04-29 NOTE — H&P PST ADULT - PROBLEM SELECTOR PLAN 1
cerebral angiogram/venogram scheduled for 5/2/2022  Preop instructions provided including antibacterial wash

## 2022-04-29 NOTE — H&P PST ADULT - NSICDXPASTMEDICALHX_GEN_ALL_CORE_FT
PAST MEDICAL HISTORY:  Cervical disc disease h/o cervical fusion    Chiari I malformation diagnsoed in 2016; decompression Feb 2018    Craniovertebral instability     EDS (Su-Danlos syndrome)     History of asthma without exacerbations    Irritable bowel syndrome without diarrhea with constipation    Low back pain chronic 2012    Seizure onset 5/23/18, ER visit to [facility] . Was discharged on no anti seizure medications .    Seizure     Seizure disorder     Sinus tachycardia on Metoprolol ( controlled )    Tethered cord syndrome     Thrombus left sunclavian and cephalic vein 2019  s/p eloquis x 1 year

## 2022-05-02 ENCOUNTER — OUTPATIENT (OUTPATIENT)
Dept: OUTPATIENT SERVICES | Facility: HOSPITAL | Age: 42
LOS: 1 days | End: 2022-05-02
Payer: MEDICARE

## 2022-05-02 ENCOUNTER — APPOINTMENT (OUTPATIENT)
Dept: NEUROSURGERY | Facility: HOSPITAL | Age: 42
End: 2022-05-02

## 2022-05-02 ENCOUNTER — TRANSCRIPTION ENCOUNTER (OUTPATIENT)
Age: 42
End: 2022-05-02

## 2022-05-02 VITALS
HEART RATE: 85 BPM | SYSTOLIC BLOOD PRESSURE: 64 MMHG | OXYGEN SATURATION: 95 % | TEMPERATURE: 98 F | RESPIRATION RATE: 134 BRPM | WEIGHT: 178.57 LBS | HEIGHT: 63 IN

## 2022-05-02 VITALS
HEART RATE: 81 BPM | SYSTOLIC BLOOD PRESSURE: 127 MMHG | OXYGEN SATURATION: 97 % | DIASTOLIC BLOOD PRESSURE: 87 MMHG | RESPIRATION RATE: 15 BRPM

## 2022-05-02 DIAGNOSIS — Z98.890 OTHER SPECIFIED POSTPROCEDURAL STATES: Chronic | ICD-10-CM

## 2022-05-02 DIAGNOSIS — Z98.89 OTHER SPECIFIED POSTPROCEDURAL STATES: Chronic | ICD-10-CM

## 2022-05-02 DIAGNOSIS — Q06.8 OTHER SPECIFIED CONGENITAL MALFORMATIONS OF SPINAL CORD: Chronic | ICD-10-CM

## 2022-05-02 DIAGNOSIS — G93.2 BENIGN INTRACRANIAL HYPERTENSION: ICD-10-CM

## 2022-05-02 DIAGNOSIS — Z98.2 PRESENCE OF CEREBROSPINAL FLUID DRAINAGE DEVICE: Chronic | ICD-10-CM

## 2022-05-02 DIAGNOSIS — Z01.818 ENCOUNTER FOR OTHER PREPROCEDURAL EXAMINATION: ICD-10-CM

## 2022-05-02 PROCEDURE — 36012 PLACE CATHETER IN VEIN: CPT

## 2022-05-02 PROCEDURE — C1887: CPT

## 2022-05-02 PROCEDURE — C1760: CPT

## 2022-05-02 PROCEDURE — 36226 PLACE CATH VERTEBRAL ART: CPT

## 2022-05-02 PROCEDURE — C1769: CPT

## 2022-05-02 PROCEDURE — 36223 PLACE CATH CAROTID/INOM ART: CPT | Mod: 50

## 2022-05-02 PROCEDURE — 36226 PLACE CATH VERTEBRAL ART: CPT | Mod: 50

## 2022-05-02 PROCEDURE — 75870 VEIN X-RAY SKULL: CPT | Mod: 26,59

## 2022-05-02 PROCEDURE — C1894: CPT

## 2022-05-02 PROCEDURE — 75860 VEIN X-RAY NECK: CPT | Mod: 26,59

## 2022-05-02 PROCEDURE — 75870 VEIN X-RAY SKULL: CPT

## 2022-05-02 PROCEDURE — 36223 PLACE CATH CAROTID/INOM ART: CPT

## 2022-05-02 RX ORDER — OXYCODONE HYDROCHLORIDE 5 MG/1
2 TABLET ORAL
Qty: 0 | Refills: 0 | DISCHARGE

## 2022-05-02 RX ORDER — SODIUM CHLORIDE 9 MG/ML
1000 INJECTION, SOLUTION INTRAVENOUS
Refills: 0 | Status: DISCONTINUED | OUTPATIENT
Start: 2022-05-02 | End: 2022-05-16

## 2022-05-02 RX ORDER — TIZANIDINE 4 MG/1
0 TABLET ORAL
Qty: 0 | Refills: 0 | DISCHARGE

## 2022-05-02 RX ORDER — MORPHINE SULFATE 50 MG/1
15 CAPSULE, EXTENDED RELEASE ORAL
Qty: 0 | Refills: 0 | DISCHARGE

## 2022-05-02 RX ORDER — ONDANSETRON 8 MG/1
1 TABLET, FILM COATED ORAL
Qty: 0 | Refills: 0 | DISCHARGE

## 2022-05-02 RX ORDER — FAMOTIDINE 10 MG/ML
1 INJECTION INTRAVENOUS
Qty: 0 | Refills: 0 | DISCHARGE

## 2022-05-02 RX ORDER — ALBUTEROL 90 UG/1
2 AEROSOL, METERED ORAL
Qty: 0 | Refills: 0 | DISCHARGE

## 2022-05-02 RX ORDER — ACETAMINOPHEN 500 MG
1000 TABLET ORAL ONCE
Refills: 0 | Status: DISCONTINUED | OUTPATIENT
Start: 2022-05-02 | End: 2022-05-16

## 2022-05-02 RX ORDER — MIRABEGRON 50 MG/1
1 TABLET, EXTENDED RELEASE ORAL
Qty: 0 | Refills: 0 | DISCHARGE

## 2022-05-02 RX ORDER — DULOXETINE HYDROCHLORIDE 30 MG/1
1 CAPSULE, DELAYED RELEASE ORAL
Qty: 0 | Refills: 0 | DISCHARGE

## 2022-05-02 NOTE — ASU DISCHARGE PLAN (ADULT/PEDIATRIC) - CARE PROVIDER_API CALL
Lefty Dale; MPH)  Radiology  [address], [address]  [place] [address]  Phone: [phone]  Fax: [phone]  Follow Up Time:

## 2022-05-02 NOTE — CHART NOTE - NSCHARTNOTEFT_GEN_A_CORE
Interventional Neuro Radiology  Pre-Procedure Note    This is a 42y ____ hand dominant Female      HPI:      Neuro Exam: Awake and alert, oriented x3, fluent, normal naming and repetition, follows 3 step commands. Extraocular movements intact, no nystagmus, visual fields full, face symmetric, tongue midline. No drift, 5/5 power x 4 extremities. Normal sensation to LT. Normal finger-to-nose and rapid alternating movements.    PAST MEDICAL & SURGICAL HISTORY:  Low back pain  chronic     Cervical disc disease  h/o cervical fusion    Irritable bowel syndrome without diarrhea  with constipation    Chiari I malformation  diagnsoed in ; decompression 2018    EDS (Su-Danlos syndrome)    Tethered cord syndrome    Seizure  onset 18, ER visit to [facility] . Was discharged on no anti seizure medications .    Craniovertebral instability    History of asthma  without exacerbations    Sinus tachycardia  on Metoprolol ( controlled )    Seizure disorder    Seizure    Thrombus  left sunclavian and cephalic vein   s/p eloquis x 1 year    S/P lumbar laminectomy  , complicated with CSF leak, S/P blood patch    H/O:       History of cholecystectomy  laparoscopic     History of tonsillectomy  age 18    S/P myelogram  c/b CSF leak, spinal headache, S/P blood patch    History of back surgery  2017--  T12 vertebral column resection, T8-L2 instrumentation and fusion.    H/O craniotomy  16 suboccipital crani for C1 arch resection w posterior occipital cervical hardware fusion to C2 , was revised 18.    H/O cervical spine surgery    H/O lumbosacral spine surgery    H/O brain surgery    Tethered spinal cord  release  [facility]    S/P  shunt  placed  removed 2021 due to infection        Social History:   Denies tobacco use    FAMILY HISTORY:  Family history of drug abuse    Family history of hepatitis C    Family history of coronary artery disease (Sibling)  sister has 4 stents dx age 38      No pertinent family history    Allergies: Bee Stings (Anaphylaxis)  Keflex (Anaphylaxis)  latex (Rash)  penicillin (Unknown)  penicillins (Anaphylaxis)  TEGADERM (Unknown)      Current Medications:   · 	Keppra 500 mg oral tablet: Last Dose Taken:  , 1 tab(s) orally once a day - mid day  · 	docusate sodium 100 mg oral capsule: Last Dose Taken:  , 1 cap(s) orally once a day  · 	Pepcid 40 mg oral tablet: Last Dose Taken:  , 1 tab(s) orally once a day, As Needed  · 	Topamax: Last Dose Taken:  , 150 milligram(s) orally 2 times a day  · 	tiZANidine 4 mg oral tablet: Last Dose Taken:  , orally once a day  · 	DULoxetine 60 mg oral delayed release capsule: Last Dose Taken:  , 1 cap(s) orally once a day  · 	clonazePAM 1 mg oral tablet: Last Dose Taken:  , 1 tab(s) orally 3 times a day, As Needed  · 	Keppra 1000 mg oral tablet: Last Dose Taken:  , 1 tab(s) orally 2 times a day  · 	Vimpat 200 mg oral tablet: 1 tab(s) orally 2 times a day  · 	Zofran 4 mg oral tablet: 1 tab(s) orally every 8 hours, As Needed  · 	morphine 12 to 24 hour extended release: 15 milligram(s) parenteral 2 times a day  · 	Albuterol (Eqv-ProAir HFA) 90 mcg/inh inhalation aerosol: Last Dose Taken:  , 2 puff(s) inhaled every 6 hours, As Needed  · 	Myrbetriq 50 mg oral tablet, extended release: 1 tab(s) orally once a day  · 	oxyCODONE 5 mg oral tablet: 2 tab(s) orally 3 times a day    Labs:                         14.9   10.54 )-----------( 227      ( 2022 19:59 )             46.4           139  |  106  |  13  ----------------------------<  81  4.2   |  21<L>  |  0.66            HCG: negative    Blood Bank: 22  O  --  Positive      Assessment/Plan:   This is a 42y ____ hand dominant Female  presents with ______. Patient presents to neuro-IR for selective cerebral angiography. Procedure/ risks/ benefits/ goals/ alternatives were explained. Risks include but are not limited to stroke/ vessel injury/ hemorrhage/ groin hematoma. All questions answered. Informed content obtained from patient____. Consent placed in chart. Interventional Neuro Radiology  Pre-Procedure Note    This is a 42F with a pmhx significant for seizures, asthma, current smoker, chiari malformation, Su-Danlos syndrome chronic headaches and LBP, tethered spinal cord s/p release  and idiopathic intracranial hypertension diagnosed . Pt presents today for cerebral venogram and angiogram. coivid negative.      Neuro Exam: Awake and alert, oriented x3, fluent, normal naming and repetition, follows 3 step commands. Extraocular movements intact, no nystagmus, visual fields full, face symmetric, tongue midline. No drift, 5/5 power x 4 extremities. Normal sensation to LT. Normal finger-to-nose and rapid alternating movements.    PAST MEDICAL & SURGICAL HISTORY:  Low back pain  chronic     Cervical disc disease  h/o cervical fusion    Irritable bowel syndrome without diarrhea  with constipation    Chiari I malformation  diagnosed in ; decompression 2018    EDS (Su-Danlos syndrome)    Tethered cord syndrome    Seizure  onset 18, ER visit to [facility] . Was discharged on no anti seizure medications .    Craniovertebral instability    History of asthma  without exacerbations    Sinus tachycardia  on Metoprolol ( controlled )    Seizure disorder    Seizure    Thrombus  left subclavian and cephalic vein 2019  s/p eliquis x 1 year    S/P lumbar laminectomy  , complicated with CSF leak, S/P blood patch    H/O:   2004    History of cholecystectomy  laparoscopic     History of tonsillectomy  age 18    S/P myelogram  c/b CSF leak, spinal headache, S/P blood patch    History of back surgery  2017--  T12 vertebral column resection, T8-L2 instrumentation and fusion.    H/O craniotomy  16 suboccipital crani for C1 arch resection w posterior occipital cervical hardware fusion to C2 , was revised 18.    H/O cervical spine surgery    H/O lumbosacral spine surgery    H/O brain surgery    Tethered spinal cord  release  [facility]    S/P  shunt  placed  removed 2021 due to infection        Social History:   Denies tobacco use    FAMILY HISTORY:  Family history of drug abuse    Family history of hepatitis C    Family history of coronary artery disease (Sibling)  sister has 4 stents dx age 38      No pertinent family history    Allergies: Bee Stings (Anaphylaxis)  Keflex (Anaphylaxis)  latex (Rash)  penicillin (Unknown)  penicillins (Anaphylaxis)  TEGADERM (Unknown)      Current Medications:   · 	Keppra 500 mg oral tablet: Last Dose Taken:  , 1 tab(s) orally once a day - mid day  · 	docusate sodium 100 mg oral capsule: Last Dose Taken:  , 1 cap(s) orally once a day  · 	Pepcid 40 mg oral tablet: Last Dose Taken:  , 1 tab(s) orally once a day, As Needed  · 	Topamax: Last Dose Taken:  , 150 milligram(s) orally 2 times a day  · 	tiZANidine 4 mg oral tablet: Last Dose Taken:  , orally once a day  · 	DULoxetine 60 mg oral delayed release capsule: Last Dose Taken:  , 1 cap(s) orally once a day  · 	clonazePAM 1 mg oral tablet: Last Dose Taken:  , 1 tab(s) orally 3 times a day, As Needed  · 	Keppra 1000 mg oral tablet: Last Dose Taken:  , 1 tab(s) orally 2 times a day  · 	Vimpat 200 mg oral tablet: 1 tab(s) orally 2 times a day  · 	Zofran 4 mg oral tablet: 1 tab(s) orally every 8 hours, As Needed  · 	morphine 12 to 24 hour extended release: 15 milligram(s) parenteral 2 times a day  · 	Albuterol (Eqv-ProAir HFA) 90 mcg/inh inhalation aerosol: Last Dose Taken:  , 2 puff(s) inhaled every 6 hours, As Needed  · 	Myrbetriq 50 mg oral tablet, extended release: 1 tab(s) orally once a day  · 	oxyCODONE 5 mg oral tablet: 2 tab(s) orally 3 times a day    Labs:                         14.9   10.54 )-----------( 227      ( 2022 19:59 )             46.4           139  |  106  |  13  ----------------------------<  81  4.2   |  21<L>  |  0.66            HCG: negative    Blood Bank: 22  O  --  Positive      Assessment/Plan:   This is a 41y/o Female who presents with IIH. Patient presents to neuro-IR for selective cerebral angiography/venography. Procedure/ risks/ benefits/ goals/ alternatives were explained. Risks include but are not limited to stroke/ vessel injury/ hemorrhage/ groin hematoma. All questions answered. Informed content obtained from patient. Consent placed in chart.    Kathy Chavez PA-C

## 2022-05-02 NOTE — ASU PATIENT PROFILE, ADULT - FALL HARM RISK - HARM RISK INTERVENTIONS

## 2022-05-02 NOTE — ASU DISCHARGE PLAN (ADULT/PEDIATRIC) - NURSING INSTRUCTIONS
Please feel free to contact us at [phone] if any problems arise. After 6PM, Monday through Friday, on weekends and on holidays, please call [phone] and ask for the radiology resident on call to be paged.

## 2022-05-02 NOTE — CHART NOTE - NSCHARTNOTEFT_GEN_A_CORE
Interventional Neuro- Radiology   Procedure Note      Procedure: Selective Cerebral Angiography/ Venography/ Manometry  Pre- Procedure Diagnosis:  Post- Procedure Diagnosis:    : Dr. Chito MD    Physician Assistant: Kathy Chavez PA-C    RN:  Tech:    Anesthesia: (MAC)   (general anesthesia)    I/Os:  Fluids:  Borja:  Contrast:  Estimated Blood Loss: <10cc    Preliminary Report:  Under MAC/ general anesthesia, using a ___Fr short/long sheath to the right/ left/ bilateral groin/ using a 5/4 sheath to the right radial artery examination of superior sagittal sinus, left/right transverse sinus, left/ right sigmoid sinus via selective cerebral angiography/venography/manometry were performed and demonstrated ________. (Official note to follow).    Patient tolerated procedure well, vital signs stable, hemodynamically stable, no change in neurological status compared to baseline. Results discussed with neurosurgery/ patient and their family. Groin sheath d/c'ed, manual compression held to hemostasis, no active bleeding, no hematoma, vascade closure device applied, quick clot and safeguard balloon dressing applied at _____h. Radial sheath d/c'd, TR band applied at ____h with ___cc of air; no bleeding, no hematoma. Patient transferred to PACU/ IR recovery for further care/ monitoring. Interventional Neuro- Radiology   Procedure Note      Procedure: Selective Cerebral Angiography/ Venography/ Manometry  Pre- Procedure Diagnosis:  Post- Procedure Diagnosis:    : Dr. Chito MD  Fellow: Dr. Lorenzana  Physician Assistant: Kathy Chavez PA-C    RN: Avinash/ Milla  Tech: Prashanth/ Gerardo    Anesthesia: Dr. Alvaro MD  (MAC)    I/Os:  Fluids: 700 cc  Borja: DTV  Contrast: 66 cc  Estimated Blood Loss: <10cc    Preliminary Report:  Under MAC, using a 5Fr short sheath to the right groin and using a 5/4 sheath to the right radial artery examination of superior sagittal sinus, left/right transverse sinus, left/ right sigmoid sinus, right vertebral artery, right internal carotid artery, left internal carotid artery via selective cerebral angiography/venography/manometry were performed and demonstrated ________. (Official note to follow).    Patient tolerated procedure well, vital signs stable, hemodynamically stable, no change in neurological status compared to baseline. Results discussed with neurosurgery/ patient and their family. Groin sheath d/c'ed, manual compression held to hemostasis, no active bleeding, no hematoma, vascade closure device applied, quick clot and safeguard balloon dressing applied at 13:10h. Radial sheath d/c'd, TR band applied at 1300h with 12cc of air; no bleeding, no hematoma. Patient transferred to IR recovery for further care/ monitoring.    Kathy Chavez PA-C

## 2022-05-02 NOTE — ASU PATIENT PROFILE, ADULT - NSICDXPASTSURGICALHX_GEN_ALL_CORE_FT
PAST SURGICAL HISTORY:  H/O brain surgery     H/O cervical spine surgery     H/O craniotomy 16 suboccipital crani for C1 arch resection w posterior occipital cervical hardware fusion to C2 , was revised 18.    H/O lumbosacral spine surgery     H/O:      History of back surgery 2017--  T12 vertebral column resection, T8-L2 instrumentation and fusion.    History of cholecystectomy laparoscopic     History of tonsillectomy age 18    S/P lumbar laminectomy , complicated with CSF leak, S/P blood patch    S/P myelogram c/b CSF leak, spinal headache, S/P blood patch    S/P  shunt placed  removed 2021 due to infection    Tethered spinal cord release  [facility]

## 2022-05-02 NOTE — ASU PATIENT PROFILE, ADULT - NSICDXPASTMEDICALHX_GEN_ALL_CORE_FT
PAST MEDICAL HISTORY:  Cervical disc disease h/o cervical fusion    Chiari I malformation diagnsoed in 2016; decompression Feb 2018    Craniovertebral instability     EDS (Su-Danlos syndrome)     History of asthma without exacerbations    Irritable bowel syndrome without diarrhea with constipation    Low back pain chronic 2012    Seizure onset 5/23/18, ER visit to [facility] . Was discharged on no anti seizure medications .    Seizure     Seizure disorder     Sinus tachycardia on Metoprolol ( controlled )    Tethered cord syndrome     Thrombus left sunclavian and cephalic vein 2019  s/p eloquis x 1 year

## 2022-05-05 ENCOUNTER — NON-APPOINTMENT (OUTPATIENT)
Age: 42
End: 2022-05-05

## 2022-05-17 ENCOUNTER — APPOINTMENT (OUTPATIENT)
Dept: NEUROLOGY | Facility: CLINIC | Age: 42
End: 2022-05-17
Payer: MEDICARE

## 2022-05-17 ENCOUNTER — NON-APPOINTMENT (OUTPATIENT)
Age: 42
End: 2022-05-17

## 2022-05-17 VITALS
BODY MASS INDEX: 31.71 KG/M2 | WEIGHT: 179 LBS | TEMPERATURE: 98.1 F | SYSTOLIC BLOOD PRESSURE: 122 MMHG | OXYGEN SATURATION: 98 % | HEART RATE: 90 BPM | DIASTOLIC BLOOD PRESSURE: 84 MMHG | HEIGHT: 63 IN

## 2022-05-17 DIAGNOSIS — F41.9 ANXIETY DISORDER, UNSPECIFIED: ICD-10-CM

## 2022-05-17 DIAGNOSIS — Z86.69 PERSONAL HISTORY OF OTHER DISEASES OF THE NERVOUS SYSTEM AND SENSE ORGANS: ICD-10-CM

## 2022-05-17 DIAGNOSIS — F32.A ANXIETY DISORDER, UNSPECIFIED: ICD-10-CM

## 2022-05-17 DIAGNOSIS — Z01.818 ENCOUNTER FOR OTHER PREPROCEDURAL EXAMINATION: ICD-10-CM

## 2022-05-17 DIAGNOSIS — Z86.73 PERSONAL HISTORY OF TRANSIENT ISCHEMIC ATTACK (TIA), AND CEREBRAL INFARCTION W/OUT RESIDUAL DEFICITS: ICD-10-CM

## 2022-05-17 PROBLEM — I82.90 ACUTE EMBOLISM AND THROMBOSIS OF UNSPECIFIED VEIN: Chronic | Status: ACTIVE | Noted: 2022-04-29

## 2022-05-17 PROCEDURE — 93040 RHYTHM ECG WITH REPORT: CPT

## 2022-05-17 PROCEDURE — 99205 OFFICE O/P NEW HI 60 MIN: CPT

## 2022-05-17 PROCEDURE — 95816 EEG AWAKE AND DROWSY: CPT

## 2022-05-18 PROBLEM — Z86.73 HISTORY OF CEREBROVASCULAR ACCIDENT: Status: RESOLVED | Noted: 2022-05-17 | Resolved: 2022-05-18

## 2022-05-18 PROBLEM — Z86.69 HISTORY OF SLEEP APNEA: Status: RESOLVED | Noted: 2022-05-17 | Resolved: 2022-05-18

## 2022-05-18 PROBLEM — F41.9 ANXIETY AND DEPRESSION: Status: RESOLVED | Noted: 2022-05-17 | Resolved: 2022-05-18

## 2022-05-18 RX ORDER — SERTRALINE HYDROCHLORIDE 50 MG/1
50 TABLET, FILM COATED ORAL
Refills: 0 | Status: ACTIVE | COMMUNITY

## 2022-05-18 RX ORDER — FAMOTIDINE 40 MG/1
TABLET, FILM COATED ORAL
Refills: 0 | Status: ACTIVE | COMMUNITY

## 2022-05-18 RX ORDER — LEVETIRACETAM 1000 MG/1
1000 TABLET, FILM COATED ORAL
Refills: 0 | Status: ACTIVE | COMMUNITY

## 2022-05-18 RX ORDER — TIZANIDINE HYDROCHLORIDE 4 MG/1
4 CAPSULE ORAL
Refills: 0 | Status: ACTIVE | COMMUNITY

## 2022-05-18 RX ORDER — LEVETIRACETAM 500 MG/1
500 TABLET, FILM COATED ORAL
Refills: 0 | Status: ACTIVE | COMMUNITY

## 2022-05-18 RX ORDER — LEVOCETIRIZINE DIHYDROCHLORIDE 5 MG/1
TABLET, FILM COATED ORAL
Refills: 0 | Status: ACTIVE | COMMUNITY

## 2022-05-18 RX ORDER — ASPIRIN 81 MG
TABLET,CHEWABLE ORAL
Refills: 0 | Status: ACTIVE | COMMUNITY

## 2022-05-18 RX ORDER — CLONAZEPAM 1 MG/1
1 TABLET ORAL
Refills: 0 | Status: ACTIVE | COMMUNITY

## 2022-05-18 RX ORDER — TAMSULOSIN HYDROCHLORIDE 0.4 MG/1
0.4 CAPSULE ORAL
Refills: 0 | Status: ACTIVE | COMMUNITY

## 2022-05-18 NOTE — PHYSICAL EXAM
[FreeTextEntry1] : GENERAL PHYSICAL EXAM:\par GEN: no distress\par HEENT: NCAT, OP clear\par EYES: sclera white, conjunctiva clear, no nystagmus\par NECK: supple\par CV: RRR    		\par PULM: CTAB, no wheezing\par GI: soft ABD, +BS, NT, ND\par EXT: peripheral pulse intact, no cyanosis\par MSK: muscle tone and strength normal\par SKIN: warm, dry, no rash or lesion on exposed skin \par \par NEUROLOGICAL EXAM:\par Mental Status\par Orientation: alert and oriented to person, place, time, and situation \par Language: clear and fluent\par \par Cranial Nerves\par II: full visual fields intact \par III, IV, VI: PERRL, EOMI\par V, VII: facial sensation and movement intact and symmetric \par VIII: hearing intact \par IX, X: uvula midline, soft palate elevates normally \par XI: BL shoulder shrug intact \par XII: tongue midline\par \par Motor\par 5/5 in RUE, RLE\par 5/5 in LUE, LLE             \par Tone and bulk are normal in upper and lower limbs\par No pronator drift\par \par Sensation\par Intact to light touch and pinprick in all 4 EXTs\par \par Reflex\par 2+ in BL biceps, triceps, brachioradialis, patella, ankle                                    \par Plantar responses downward bilaterally\par \par Coordination\par Normal FTN bilaterally\par \par Gait\par Ambulates independently \par

## 2022-05-18 NOTE — ASSESSMENT
[FreeTextEntry1] : HAM MCDOWELL is a 42 year-old RH female with a history of severe LBP radiation to R>LLE s/p L5-S1 laminectomy/discectomy 2012 (Dr. Jones at [facility]), arachnoiditis and tethering of the nerve roots s/p spinal stimulator placement 6/2014 and later removal 8/2016 (Dr. Gutierrez), tethered cord syndrome s/p T12 removal and T8-L2 fusion 5/2017 (Dr. 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 Murray) and tonsillar shrinkage 8/2018 (Dr. Diego), IIH s/p VPS 2/2020 c/b catheter infection and removal 11/2021 (Dr. Diego), Su-Danlos syndrome, lacunar infarct in the left thalamus, anxiety and depression who presents for drug-resistant epilepsy. Personally reviewed all images, labs and other studies. \par \par Presumed intractable BTC sz's, but also concern for PNES. Admit to Epilepsy Monitoring Unit (EMU) for better characterization of seizures. Meanwhile, adjust ASM. Thoroughly went over side effects of medication, detailed direction and tapering/titrating schedule regarding medication administration. All questions and concerns answered and addressed in detail to patient's complete satisfaction. Patient verbalized understanding and agreed to plan.\par \par \par - Admit to EMU 5/23. The purpose of the EMU admission is to differentiate epileptic from nonepileptic events. The expected length of stay is 3-4 days.\par => taper off ASM \par \par - change and increase LEV IR 1000/500/1000mg to ER 2000mg BID\par - cross-titrate LCM 200mg BID for CLB 10mg BID (refer to separate chart note with detailed titration schedule)\par - continue TPM 200mg BID for headache and sz\par - start pyridoxine 100mg daily\par - advised contraceptives\par - no driving \par - f/u pending EMU result\par \par \par All relevant epilepsy AAN quality care measures were addressed and discussed with the patient.\par \par More than 50% of time spent counseling and educating patient about epilepsy specific safety issues including ASM side effects and interactions, alcohol consumption, sleep deprivation, risks and driving privileges associated with the [facility] Guidelines, interaction with contraceptives and how treatment may affect pregnancy, death related to seizures/SUDEP, seizure 1st aid and risks. Patient is educated on seizure precautions, including no driving, no operating machinery, no swimming or bathing, no climbing heights, or engage in any risky activities during which a seizure could cause further injury to pt or others. \par

## 2022-06-06 ENCOUNTER — INPATIENT (INPATIENT)
Facility: HOSPITAL | Age: 42
LOS: 1 days | Discharge: HOME | DRG: 101 | End: 2022-06-08
Attending: HOSPITALIST | Admitting: PSYCHIATRY & NEUROLOGY
Payer: MEDICARE

## 2022-06-06 VITALS
SYSTOLIC BLOOD PRESSURE: 118 MMHG | TEMPERATURE: 98 F | RESPIRATION RATE: 17 BRPM | OXYGEN SATURATION: 96 % | DIASTOLIC BLOOD PRESSURE: 80 MMHG | HEART RATE: 68 BPM

## 2022-06-06 DIAGNOSIS — Z98.890 OTHER SPECIFIED POSTPROCEDURAL STATES: Chronic | ICD-10-CM

## 2022-06-06 DIAGNOSIS — Q06.8 OTHER SPECIFIED CONGENITAL MALFORMATIONS OF SPINAL CORD: Chronic | ICD-10-CM

## 2022-06-06 DIAGNOSIS — Z98.89 OTHER SPECIFIED POSTPROCEDURAL STATES: Chronic | ICD-10-CM

## 2022-06-06 DIAGNOSIS — Z98.2 PRESENCE OF CEREBROSPINAL FLUID DRAINAGE DEVICE: Chronic | ICD-10-CM

## 2022-06-06 DIAGNOSIS — G40.919 EPILEPSY, UNSPECIFIED, INTRACTABLE, WITHOUT STATUS EPILEPTICUS: ICD-10-CM

## 2022-06-06 LAB
ALBUMIN SERPL ELPH-MCNC: 3.7 G/DL — SIGNIFICANT CHANGE UP (ref 3.3–5.2)
ALP SERPL-CCNC: 82 U/L — SIGNIFICANT CHANGE UP (ref 40–120)
ALT FLD-CCNC: 28 U/L — SIGNIFICANT CHANGE UP
ANION GAP SERPL CALC-SCNC: 10 MMOL/L — SIGNIFICANT CHANGE UP (ref 5–17)
AST SERPL-CCNC: 16 U/L — SIGNIFICANT CHANGE UP
BILIRUB SERPL-MCNC: 0.2 MG/DL — LOW (ref 0.4–2)
BUN SERPL-MCNC: 15 MG/DL — SIGNIFICANT CHANGE UP (ref 8–20)
CALCIUM SERPL-MCNC: 8.5 MG/DL — LOW (ref 8.6–10.2)
CHLORIDE SERPL-SCNC: 111 MMOL/L — HIGH (ref 98–107)
CO2 SERPL-SCNC: 20 MMOL/L — LOW (ref 22–29)
CREAT SERPL-MCNC: 0.75 MG/DL — SIGNIFICANT CHANGE UP (ref 0.5–1.3)
EGFR: 102 ML/MIN/1.73M2 — SIGNIFICANT CHANGE UP
GLUCOSE SERPL-MCNC: 99 MG/DL — SIGNIFICANT CHANGE UP (ref 70–99)
HCT VFR BLD CALC: 39.9 % — SIGNIFICANT CHANGE UP (ref 34.5–45)
HGB BLD-MCNC: 13.2 G/DL — SIGNIFICANT CHANGE UP (ref 11.5–15.5)
MCHC RBC-ENTMCNC: 31 PG — SIGNIFICANT CHANGE UP (ref 27–34)
MCHC RBC-ENTMCNC: 33.1 GM/DL — SIGNIFICANT CHANGE UP (ref 32–36)
MCV RBC AUTO: 93.7 FL — SIGNIFICANT CHANGE UP (ref 80–100)
PLATELET # BLD AUTO: 215 K/UL — SIGNIFICANT CHANGE UP (ref 150–400)
POTASSIUM SERPL-MCNC: 3.5 MMOL/L — SIGNIFICANT CHANGE UP (ref 3.5–5.3)
POTASSIUM SERPL-SCNC: 3.5 MMOL/L — SIGNIFICANT CHANGE UP (ref 3.5–5.3)
PROT SERPL-MCNC: 5.8 G/DL — LOW (ref 6.6–8.7)
RBC # BLD: 4.26 M/UL — SIGNIFICANT CHANGE UP (ref 3.8–5.2)
RBC # FLD: 12.7 % — SIGNIFICANT CHANGE UP (ref 10.3–14.5)
SODIUM SERPL-SCNC: 141 MMOL/L — SIGNIFICANT CHANGE UP (ref 135–145)
WBC # BLD: 6.76 K/UL — SIGNIFICANT CHANGE UP (ref 3.8–10.5)
WBC # FLD AUTO: 6.76 K/UL — SIGNIFICANT CHANGE UP (ref 3.8–10.5)

## 2022-06-06 PROCEDURE — 99223 1ST HOSP IP/OBS HIGH 75: CPT

## 2022-06-06 PROCEDURE — 95720 EEG PHY/QHP EA INCR W/VEEG: CPT

## 2022-06-06 PROCEDURE — 99223 1ST HOSP IP/OBS HIGH 75: CPT | Mod: AI

## 2022-06-06 RX ORDER — LORATADINE 10 MG/1
10 TABLET ORAL DAILY
Refills: 0 | Status: DISCONTINUED | OUTPATIENT
Start: 2022-06-06 | End: 2022-06-08

## 2022-06-06 RX ORDER — NICOTINE POLACRILEX 2 MG
1 GUM BUCCAL DAILY
Refills: 0 | Status: DISCONTINUED | OUTPATIENT
Start: 2022-06-06 | End: 2022-06-08

## 2022-06-06 RX ORDER — TOPIRAMATE 25 MG
100 TABLET ORAL
Refills: 0 | Status: DISCONTINUED | OUTPATIENT
Start: 2022-06-06 | End: 2022-06-07

## 2022-06-06 RX ORDER — ASPIRIN/CALCIUM CARB/MAGNESIUM 324 MG
81 TABLET ORAL DAILY
Refills: 0 | Status: DISCONTINUED | OUTPATIENT
Start: 2022-06-06 | End: 2022-06-08

## 2022-06-06 RX ORDER — FAMOTIDINE 10 MG/ML
40 INJECTION INTRAVENOUS DAILY
Refills: 0 | Status: DISCONTINUED | OUTPATIENT
Start: 2022-06-06 | End: 2022-06-08

## 2022-06-06 RX ORDER — LEVOCETIRIZINE DIHYDROCHLORIDE 0.5 MG/ML
1 SOLUTION ORAL
Qty: 0 | Refills: 0 | DISCHARGE

## 2022-06-06 RX ORDER — ENOXAPARIN SODIUM 100 MG/ML
40 INJECTION SUBCUTANEOUS EVERY 24 HOURS
Refills: 0 | Status: DISCONTINUED | OUTPATIENT
Start: 2022-06-06 | End: 2022-06-08

## 2022-06-06 RX ORDER — MORPHINE SULFATE 50 MG/1
15 CAPSULE, EXTENDED RELEASE ORAL
Refills: 0 | Status: DISCONTINUED | OUTPATIENT
Start: 2022-06-06 | End: 2022-06-08

## 2022-06-06 RX ORDER — DOCUSATE SODIUM 100 MG
1 CAPSULE ORAL
Qty: 0 | Refills: 0 | DISCHARGE

## 2022-06-06 RX ORDER — OXYCODONE HYDROCHLORIDE 5 MG/1
10 TABLET ORAL THREE TIMES A DAY
Refills: 0 | Status: DISCONTINUED | OUTPATIENT
Start: 2022-06-06 | End: 2022-06-08

## 2022-06-06 RX ORDER — SERTRALINE 25 MG/1
1 TABLET, FILM COATED ORAL
Qty: 0 | Refills: 0 | DISCHARGE

## 2022-06-06 RX ORDER — ALBUTEROL 90 UG/1
2 AEROSOL, METERED ORAL EVERY 6 HOURS
Refills: 0 | Status: DISCONTINUED | OUTPATIENT
Start: 2022-06-06 | End: 2022-06-08

## 2022-06-06 RX ORDER — CLOBAZAM 10 MG/1
5 TABLET ORAL
Refills: 0 | Status: DISCONTINUED | OUTPATIENT
Start: 2022-06-06 | End: 2022-06-07

## 2022-06-06 RX ORDER — SERTRALINE 25 MG/1
50 TABLET, FILM COATED ORAL DAILY
Refills: 0 | Status: DISCONTINUED | OUTPATIENT
Start: 2022-06-06 | End: 2022-06-08

## 2022-06-06 RX ORDER — LEVETIRACETAM 250 MG/1
1000 TABLET, FILM COATED ORAL
Refills: 0 | Status: DISCONTINUED | OUTPATIENT
Start: 2022-06-06 | End: 2022-06-07

## 2022-06-06 RX ORDER — ASPIRIN/CALCIUM CARB/MAGNESIUM 324 MG
1 TABLET ORAL
Qty: 0 | Refills: 0 | DISCHARGE

## 2022-06-06 RX ADMIN — MORPHINE SULFATE 15 MILLIGRAM(S): 50 CAPSULE, EXTENDED RELEASE ORAL at 18:22

## 2022-06-06 RX ADMIN — MORPHINE SULFATE 15 MILLIGRAM(S): 50 CAPSULE, EXTENDED RELEASE ORAL at 17:57

## 2022-06-06 RX ADMIN — Medication 81 MILLIGRAM(S): at 21:02

## 2022-06-06 RX ADMIN — CLOBAZAM 5 MILLIGRAM(S): 10 TABLET ORAL at 17:57

## 2022-06-06 RX ADMIN — Medication 100 MILLIGRAM(S): at 17:58

## 2022-06-06 RX ADMIN — OXYCODONE HYDROCHLORIDE 10 MILLIGRAM(S): 5 TABLET ORAL at 21:02

## 2022-06-06 RX ADMIN — OXYCODONE HYDROCHLORIDE 10 MILLIGRAM(S): 5 TABLET ORAL at 21:40

## 2022-06-06 RX ADMIN — LEVETIRACETAM 1000 MILLIGRAM(S): 250 TABLET, FILM COATED ORAL at 17:57

## 2022-06-06 RX ADMIN — SERTRALINE 50 MILLIGRAM(S): 25 TABLET, FILM COATED ORAL at 21:02

## 2022-06-06 RX ADMIN — LORATADINE 10 MILLIGRAM(S): 10 TABLET ORAL at 21:02

## 2022-06-06 RX ADMIN — ENOXAPARIN SODIUM 40 MILLIGRAM(S): 100 INJECTION SUBCUTANEOUS at 17:57

## 2022-06-06 NOTE — H&P ADULT - NSHPPHYSICALEXAM_GEN_ALL_CORE
PHYSICAL EXAM:    GENERAL: NAD, well-groomed, well-developed  HEAD:  Atraumatic, Normocephalic  EYES: EOMI, PERRLA, conjunctiva and sclera clear  ENMT: Moist mucous membranes, Good dentition, No lesions  NECK: Supple, No JVD  NERVOUS SYSTEM:  Alert & Oriented X3, Good concentration; Motor Strength 5/5 B/L upper and lower extremities  CHEST/LUNG: Clear to percussion bilaterally; No rales, rhonchi  HEART: Regular rate and rhythm; No murmurs  EXTREMITIES:  No clubbing, cyanosis, or edema  SKIN: No rashes or lesions

## 2022-06-06 NOTE — H&P ADULT - NSICDXPASTMEDICALHX_GEN_ALL_CORE_FT
PAST MEDICAL HISTORY:  Cervical disc disease h/o cervical fusion    Chiari I malformation diagnsoed in 2016; decompression Feb 2018    Craniovertebral instability     EDS (Su-Danlos syndrome)     History of asthma without exacerbations    Irritable bowel syndrome without diarrhea with constipation    Low back pain chronic 2012    Seizure onset 5/23/18, ER visit to [facility] . Was discharged on no anti seizure medications .    Seizure     Seizure disorder     Sinus tachycardia on Metoprolol ( controlled )    Tethered cord syndrome     Thrombus left sunclavian and cephalic vein 2019  s/p eloquis x 1 year

## 2022-06-06 NOTE — H&P ADULT - HISTORY OF PRESENT ILLNESS
42yr old with PMH of multiple spine surgeries, intracranial hypertension s/p  shunt - s/p removal 2/2 infection, Su-Danlos syndrome, lacunar infarct in the left thalamus, anxiety and depression, chronic back pain, seizures presents for video EEG for uncontrolled Seizures on current AEDs.

## 2022-06-06 NOTE — H&P ADULT - ASSESSMENT
42yr old with PMH of multiple spine surgeries, intracranial hypertension s/p  shunt - s/p removal 2/2 infection, Su-Danlos syndrome, lacunar infarct in the left thalamus, anxiety and depression, chronic back pain, seizures presents for video EEG for uncontrolled Seizures on current AEDs.     # seizures - Video EEG   AEDs management per neuro     # chronic back pain with multiple spine surgeries - on morphine ER 15 BID , oxy 10 tid , tizanidine  # Tobacco abuse - nicotine patch to assist   # asthma - not in falre - ct alb inhaler prn   # Hx CVA - on aspirin   # VTE x - lovenox

## 2022-06-06 NOTE — CONSULT NOTE ADULT - ASSESSMENT
42 year-old RH female with a history of severe LBP radiation to R>LLE s/p L5-S1 laminectomy/discectomy 2012 (Dr. Jones at [facility]), arachnoiditis and tethering of the nerve roots s/p spinal stimulator placement 6/2014 and later removal 8/2016 (Dr. Gutierrez), tethered cord syndrome s/p T12 removal and T8-L2 fusion 5/2017 (Dr. 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 Murray) and tonsillar shrinkage 8/2018 (Dr. Diego), IIH s/p VPS 2/2020 c/b catheter infection and removal 11/2021 (Dr. Diego), Su-Danlos syndrome, lacunar infarct in the left thalamus, anxiety and depression who presents to EMU to differentiate epileptic from nonepileptic events. Personally reviewed all imagines, labs, EEG and other studies.      Recommendation:  - cvEEG  - start  - discontinue   - taper off  - decrease  - increase  - if patient has an event: call me (8am-10pm: [phone]) or on-call Epilepsy fellow Dr. Moreland (10pm-8am: [phone]) to review EEG prior to giving any meds   - tele monitor  - OOB only with supervision and assist  - seizure precaution        Thank you for allowing Epilepsy to participate in the care of this patient.   ______________________  Bismark Willingham MD   Director, Epilepsy/EMU - [facility]   Epilepsy Consult #: [phone] ([phone])  42 year-old RH female with a history of severe LBP radiation to R>LLE s/p L5-S1 laminectomy/discectomy 2012 (Dr. Jones at [facility]), arachnoiditis and tethering of the nerve roots s/p spinal stimulator placement 6/2014 and later removal 8/2016 (Dr. Gutierrez), tethered cord syndrome s/p T12 removal and T8-L2 fusion 5/2017 (Dr. 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 Murray) and tonsillar shrinkage 8/2018 (Dr. Diego), IIH s/p VPS 2/2020 c/b catheter infection and removal 11/2021 (Dr. Diego), Su-Danlos syndrome, lacunar infarct in the left thalamus, anxiety and depression who presents to EMU to differentiate epileptic from nonepileptic events. Personally reviewed all imagines, labs, EEG and other studies.      Recommendation:  - cvEEG  - taper off   - decrease  - increase  - if patient has an event: call me (8am-10pm: [phone]) or on-call Epilepsy fellow Dr. Moreland (10pm-8am: [phone]) to review EEG prior to giving any meds   - tele monitor  - OOB only with supervision and assist  - seizure precaution        Thank you for allowing Epilepsy to participate in the care of this patient.   ______________________  Bismark Willingham MD   Director, Epilepsy/EMU - [facility]   Epilepsy Consult #: [phone] ([phone])  42 year-old RH female with a history of severe LBP radiation to R>LLE s/p L5-S1 laminectomy/discectomy 2012 (Dr. Jones at [facility]), arachnoiditis and tethering of the nerve roots s/p spinal stimulator placement 6/2014 and later removal 8/2016 (Dr. Gutierrez), tethered cord syndrome s/p T12 removal and T8-L2 fusion 5/2017 (Dr. 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 Murray) and tonsillar shrinkage 8/2018 (Dr. Diego), IIH s/p VPS 2/2020 c/b catheter infection and removal 11/2021 (Dr. Diego), Su-Danlos syndrome, lacunar infarct in the left thalamus, anxiety and depression who presents to EMU to differentiate epileptic from nonepileptic events. Personally reviewed all imagines, labs, EEG and other studies.      Recommendation:  - cvEEG  - decrease topiramate 200mg BID to 100mg BID  - decrease levetiracetam 2000mg BID to 1000mg BID  - decrease clobazam 10mg BID to 5mg BID  - if patient has a convulsive event: call me (8am-10pm: [phone]) or on-call Epilepsy fellow Dr. Moreland (10pm-8am: [phone]) to review EEG prior to giving any meds       => if convulsive event is nonepileptic: do not give medication      => if convulsive event is epileptic: lorazepam IV 2mg, levetiracetam 2000mg IV  - tele monitor  - OOB only with supervision and assist  - seizure precaution        Thank you for allowing Epilepsy to participate in the care of this patient.   ______________________  Bismark Willingham MD   Director, Epilepsy/EMU - [facility]   Epilepsy Consult #: [phone] ([phone])

## 2022-06-06 NOTE — PATIENT PROFILE ADULT - FALL HARM RISK - HARM RISK INTERVENTIONS

## 2022-06-06 NOTE — H&P ADULT - NSICDXPASTSURGICALHX_GEN_ALL_CORE_FT
PAST SURGICAL HISTORY:  H/O brain surgery     H/O cervical spine surgery     H/O craniotomy 16 suboccipital crani for C1 arch resection w posterior occipital cervical hardware fusion to C2 , was revised 18.    H/O lumbosacral spine surgery     H/O:      History of back surgery 2017--  T12 vertebral column resection, T8-L2 instrumentation and fusion.    History of cholecystectomy laparoscopic     History of tonsillectomy age 18    S/P lumbar laminectomy , complicated with CSF leak, S/P blood patch    S/P myelogram c/b CSF leak, spinal headache, S/P blood patch    S/P  shunt placed  removed 2021 due to infection    Tethered spinal cord release  [facility]

## 2022-06-06 NOTE — CONSULT NOTE ADULT - SUBJECTIVE AND OBJECTIVE BOX
[facility]                                                                     MD Valery Choi DO                                              Epilepsy Consult #: [id number] ([phone])                                               Office: [address] [place] [address], [facility]                                                 Phone: [phone]; Fax: [phone]                            ==============================================    EPILEPSY INITIAL CONSULT NOTE      HPI  This is a 42 year-old RH female with a history of severe LBP radiation to R>LLE s/p L5-S1 laminectomy/discectomy  (Dr. Jones at [facility]), arachnoiditis and tethering of the nerve roots s/p spinal stimulator placement 2014 and later removal 2016 (Dr. Gutierrez), tethered cord syndrome s/p T12 removal and T8-L2 fusion 2017 (Dr. Murray), Chiari malformation s/p suboccipital craniectomy and C1 laminectomy and placement of bilateral occipital condyle-C2 posterior craniocervical instrumentation and fusion for craniocervical instability 2018 (Dr. Murray) and tonsillar shrinkage 2018 (Dr. Diego), IIH s/p VPS 2020 c/b catheter infection and removal 2021 (Dr. Diego), Su-Danlos syndrome, lacunar infarct in the left thalamus, anxiety and depression who presents to EMU to differentiate epileptic from nonepileptic events.    Seizure onset 2018. Mother heard thud and saw pt convulsing x10m. Since then, has been having frequent seizures refractory to multiple ASM. Never had prolonged EEG recording. Admitted to EMU to better characterize these events.    SEIZURE/SPELL DESCRIPTION AND TYPE:  Type #1  Severity: aura  Onset:   Quality & associated signs/symptoms: Indescribable feeling, change in behavior/facial expression   Duration: 10-15m  Timing: once a week  Modifying factors: unknown trigger   Circadian variation: none    Type #2  Severity: convulsion  Onset:   Quality & associated signs/symptoms: type #1 -> LOC, full convulsion  Duration: aura x 10-15m -> convulsion x2-3m  Timing: once a day for a few days, q 2-3 weeks   Modifying factors: unknown trigger   Circadian variation: none    EPILEPSY TYPE: focal epilepsy VS PNES  HISTORY OF TONIC-CLONIC SZ: yes   HISTORY OF STATUS EPILEPTICUS: yes     SEIZURE RISK FACTORS:  CM s/p suboccipital craniectomy and decompression. VPS s/p removal 2021 d/t catheter infection, and IV vancomycin x4wks. Patient was a product of normal pregnancy and delivery. No history of febrile seizure, TBI or FH of seizures.    CURRENT ASM:  TPM 200mg BID - for headache and seizure  LEV ER 2000mg BID - started IR 10/2018, changed to ER 2022 d/t irritability   LCM 75mg QHS (tapering off) - started , not doing much  CLB 10mg BID – started 2022    PREVIOUS ASM:  Acetazolamide – for IIH, worsened seizures    IMAGING:   MRI w/o 2018 ([place]): Remonstration of status post Chiari I decompression. Otherwise unremarkable exam.    MRI w/wo 2017 ([facility]): The cerebellar tonsils extend 7 mm below the foramen magnum   and have a peglike configuration compatible with Chiari I malformation.     NEUROPHYSIOLOGY:  rEEG 22 (370): normal awake    NEUROPSYCHOLOGY:   None      PAST MEDICAL HISTORY:  Asthma  no recent exacerbations    Low back pain  chronic     Cervical disc disease  h/o cervical fusion    Irritable bowel syndrome without diarrhea  with constipation    Chiari I malformation  diagnsoed in ; decompression 2018    EDS (Su-Danlos syndrome)    Tethered cord syndrome    Seizure  onset 18, ER visit to [facility] . Was discharged on no anti seizure medications .    Syncope  episodes when flexes her neck forward    Craniovertebral instability    History of asthma  without exacerbations    Sinus tachycardia  on Metoprolol ( controlled )    Chiari syndrome  Arnold Chiari syndrome    Su-Danlos syndrome    Seizure disorder    Spinal surgery in prior 3 months    Chiari I malformation    Seizure    Thrombus  left sunclavian and cephalic vein   s/p eloquis x 1 year      PAST SURGICAL HISTORY:   S/P lumbar laminectomy  , complicated with CSF leak, S/P blood patch    H/O:       History of cholecystectomy  laparoscopic 2005    History of tonsillectomy  age 18    S/P myelogram  c/b CSF leak, spinal headache, S/P blood patch    History of back surgery  2017--  T12 vertebral column resection, T8-L2 instrumentation and fusion.    H/O craniotomy  16 suboccipital crani for C1 arch resection w posterior occipital cervical hardware fusion to C2 , was revised 18.    Seizure    Syncope    H/O cervical spine surgery    H/O lumbosacral spine surgery    H/O brain surgery    Tethered spinal cord  release  [facility]    S/P  shunt  placed  removed 2021 due to infection      MEDICATIONS:     ALLERGIES:   Bee Stings (Anaphylaxis)  Keflex (Anaphylaxis)  latex (Rash)  penicillin (Unknown)  penicillins (Anaphylaxis)  TEGADERM (Unknown)    FAMILY HISTORY:  Family history of drug abuse    Family history of hepatitis C    Family history of coronary artery disease (Sibling)  sister has 4 stents dx age 38    SOCIAL HISTORY:   Cutting down in tobacco. No drug, EtOH.      ROS:  Neurology as per HPI, otherwise negative for constitutional, skin, eyes, ENT, respiratory, cardiovascular, gastrointestinal, genitourinary, musculoskeletal, psychiatric, hematology/lymphatics, endocrine, allergic/immunologic.      VITALS:  T(C): --  HR: --  BP: --  ABP: --  RR: --  SpO2: --  CVP(cm H2O): --    GENERAL PHYSICAL EXAM:  GEN: no distress  HEENT: NCAT, OP clear  EYES: sclera white, conjunctiva clear, no nystagmus  NECK: supple  CV: RRR, no murmur     		  PULM: CTAB, no wheezing  ABD: soft ABD, +BS, NT  EXT: peripheral pulse intact, no cyanosis  MSK: muscle tone and strength normal  SKIN: warm, dry    NEUROLOGICAL EXAM:  Mental Status  Orientation: alert and oriented to person, place, time, and situation   Language: clear and fluent    Cranial Nerves  II: full visual fields intact   III, IV, VI: PERRL, EOMI  V, VII: facial sensation and movement intact and symmetric   VIII: hearing intact   IX, X: uvula midline, soft palate elevates normally   XI: BL shoulder shrug intact   XII: tongue midline    Motor  5/5 BUE and BLE                 Tone and bulk are normal in upper and lower limbs  No pronator drift    Sensation  Intact to light touch and pinprick in all 4 EXTs    Reflex  2+ in BL biceps and patella                                   Plantar responses downward bilaterally    Coordination  Normal FTN bilaterally    Gait  Deferred      LABS:   pending                                                                                        [facility]                                                                     MD Valery Choi DO                                              Epilepsy Consult #: [id number] ([phone])                                               Office: [address] [place] [address], [facility]                                                 Phone: [phone]; Fax: [phone]                            ==============================================    EPILEPSY INITIAL CONSULT NOTE      HPI  This is a 42 year-old RH female with a history of severe LBP radiation to R>LLE s/p L5-S1 laminectomy/discectomy  (Dr. Jones at [facility]), arachnoiditis and tethering of the nerve roots s/p spinal stimulator placement 2014 and later removal 2016 (Dr. Gutierrez), tethered cord syndrome s/p T12 removal and T8-L2 fusion 2017 (Dr. Murray), Chiari malformation s/p suboccipital craniectomy and C1 laminectomy and placement of bilateral occipital condyle-C2 posterior craniocervical instrumentation and fusion for craniocervical instability 2018 (Dr. Murray) and tonsillar shrinkage 2018 (Dr. Diego), IIH s/p VPS 2020 c/b catheter infection and removal 2021 (Dr. Diego), Su-Danlos syndrome, lacunar infarct in the left thalamus, anxiety and depression who presents to EMU to differentiate epileptic from nonepileptic events.    Seizure onset 2018. Mother heard thud and saw pt convulsing x10m. Since then, has been having frequent seizures refractory to multiple ASM. Never had prolonged EEG recording. Admitted to EMU to better characterize these events.    SEIZURE/SPELL DESCRIPTION AND TYPE:  Type #1  Severity: aura  Onset:   Quality & associated signs/symptoms: Indescribable feeling, change in behavior/facial expression   Duration: 10-15m  Timing: once a week  Modifying factors: unknown trigger   Circadian variation: none    Type #2  Severity: convulsion  Onset:   Quality & associated signs/symptoms: type #1 -> LOC, full convulsion  Duration: aura x 10-15m -> convulsion x2-3m  Timing: once a day for a few days, q 2-3 weeks   Modifying factors: unknown trigger   Circadian variation: none    EPILEPSY TYPE: focal epilepsy VS PNES  HISTORY OF TONIC-CLONIC SZ: yes   HISTORY OF STATUS EPILEPTICUS: yes     SEIZURE RISK FACTORS:  CM s/p suboccipital craniectomy and decompression. VPS s/p removal 2021 d/t catheter infection, and IV vancomycin x4wks. Patient was a product of normal pregnancy and delivery. No history of febrile seizure, TBI or FH of seizures.    CURRENT ASM:  TPM 200mg BID - for headache and seizure  LEV ER 2000mg BID - started IR 10/2018, changed to ER 2022 d/t irritability   CLB 10mg BID – started 2022    PREVIOUS ASM:  Acetazolamide – for IIH, worsened seizures  2021 to 2022, inefficacious    IMAGING:   MRI w/o 2018 ([place]): Remonstration of status post Chiari I decompression. Otherwise unremarkable exam.    MRI w/wo 2017 ([facility]): The cerebellar tonsils extend 7 mm below the foramen magnum   and have a peglike configuration compatible with Chiari I malformation.     NEUROPHYSIOLOGY:  rEEG 22 (370): normal awake    NEUROPSYCHOLOGY:   None      PAST MEDICAL HISTORY:  Asthma  no recent exacerbations    Low back pain  chronic     Cervical disc disease  h/o cervical fusion    Irritable bowel syndrome without diarrhea  with constipation    Chiari I malformation  diagnsoed in ; decompression 2018    EDS (Su-Danlos syndrome)    Tethered cord syndrome    Seizure  onset 18, ER visit to [facility] . Was discharged on no anti seizure medications .    Syncope  episodes when flexes her neck forward    Craniovertebral instability    History of asthma  without exacerbations    Sinus tachycardia  on Metoprolol ( controlled )    Chiari syndrome  Arnold Chiari syndrome    Su-Danlos syndrome    Seizure disorder    Spinal surgery in prior 3 months    Chiari I malformation    Seizure    Thrombus  left sunclavian and cephalic vein   s/p eloquis x 1 year      PAST SURGICAL HISTORY:   S/P lumbar laminectomy  , complicated with CSF leak, S/P blood patch    H/O:       History of cholecystectomy  laparoscopic 2005    History of tonsillectomy  age 18    S/P myelogram  c/b CSF leak, spinal headache, S/P blood patch    History of back surgery  2017--  T12 vertebral column resection, T8-L2 instrumentation and fusion.    H/O craniotomy  16 suboccipital crani for C1 arch resection w posterior occipital cervical hardware fusion to C2 , was revised 18.    Seizure    Syncope    H/O cervical spine surgery    H/O lumbosacral spine surgery    H/O brain surgery    Tethered spinal cord  release  [facility]    S/P  shunt  placed  removed 2021 due to infection      MEDICATIONS:     ALLERGIES:   Bee Stings (Anaphylaxis)  Keflex (Anaphylaxis)  latex (Rash)  penicillin (Unknown)  penicillins (Anaphylaxis)  TEGADERM (Unknown)    FAMILY HISTORY:  Family history of drug abuse    Family history of hepatitis C    Family history of coronary artery disease (Sibling)  sister has 4 stents dx age 38    SOCIAL HISTORY:   Cutting down in tobacco. No drug, EtOH.      ROS:  Neurology as per HPI, otherwise negative for constitutional, skin, eyes, ENT, respiratory, cardiovascular, gastrointestinal, genitourinary, musculoskeletal, psychiatric, hematology/lymphatics, endocrine, allergic/immunologic.      VITALS:  T(C): --  HR: --  BP: --  ABP: --  RR: --  SpO2: --  CVP(cm H2O): --    GENERAL PHYSICAL EXAM:  GEN: no distress  HEENT: NCAT, OP clear  EYES: sclera white, conjunctiva clear, no nystagmus  NECK: supple  CV: RRR, no murmur     		  PULM: CTAB, no wheezing  ABD: soft ABD, +BS, NT  EXT: peripheral pulse intact, no cyanosis  MSK: muscle tone and strength normal  SKIN: warm, dry    NEUROLOGICAL EXAM:  Mental Status  Orientation: alert and oriented to person, place, time, and situation   Language: clear and fluent    Cranial Nerves  II: full visual fields intact   III, IV, VI: PERRL, EOMI  V, VII: facial sensation and movement intact and symmetric   VIII: hearing intact   IX, X: uvula midline, soft palate elevates normally   XI: BL shoulder shrug intact   XII: tongue midline    Motor  5/5 BUE and BLE                 Tone and bulk are normal in upper and lower limbs  No pronator drift    Sensation  Intact to light touch and pinprick in all 4 EXTs    Reflex  2+ in BL biceps and patella                                   Plantar responses downward bilaterally    Coordination  Normal FTN bilaterally    Gait  Deferred      LABS:   pending                                                                                        [facility]                                                                     MD Valery Choi DO                                              Epilepsy Consult #: [id number] ([phone])                                               Office: [address] [place] [address], [facility]                                                 Phone: [phone]; Fax: [phone]                            ==============================================    EPILEPSY INITIAL CONSULT NOTE      HPI  This is a 42 year-old RH female with a history of severe LBP radiation to R>LLE s/p L5-S1 laminectomy/discectomy  (Dr. Jones at [facility]), arachnoiditis and tethering of the nerve roots s/p spinal stimulator placement 2014 and later removal 2016 (Dr. Gutierrez), tethered cord syndrome s/p T12 removal and T8-L2 fusion 2017 (Dr. Murray), Chiari malformation s/p suboccipital craniectomy and C1 laminectomy and placement of bilateral occipital condyle-C2 posterior craniocervical instrumentation and fusion for craniocervical instability 2018 (Dr. Murray) and tonsillar shrinkage 2018 (Dr. Diego), IIH s/p VPS 2020 c/b catheter infection and removal 2021 (Dr. Diego), Su-Danlos syndrome, lacunar infarct in the left thalamus, anxiety and depression who presents to EMU to differentiate epileptic from nonepileptic events.    Seizure onset 2018. Mother heard thud and saw pt convulsing x10m. Since then, has been having frequent seizures refractory to multiple ASM. Never had prolonged EEG recording. Admitted to EMU to better characterize these events.    SEIZURE/SPELL DESCRIPTION AND TYPE:  Type #1  Severity: aura  Onset:   Quality & associated signs/symptoms: Indescribable feeling, change in behavior/facial expression   Duration: 10-15m  Timing: once a week  Modifying factors: unknown trigger   Circadian variation: none    Type #2  Severity: convulsion  Onset:   Quality & associated signs/symptoms: type #1 -> LOC, full convulsion  Duration: aura x 10-15m -> convulsion x2-3m  Timing: once a day for a few days, q 2-3 weeks   Modifying factors: unknown trigger   Circadian variation: none    EPILEPSY TYPE: focal epilepsy VS PNES  HISTORY OF TONIC-CLONIC SZ: yes   HISTORY OF STATUS EPILEPTICUS: yes     SEIZURE RISK FACTORS:  CM s/p suboccipital craniectomy and decompression. VPS s/p removal 2021 d/t catheter infection, and IV vancomycin x4wks. Patient was a product of normal pregnancy and delivery. No history of febrile seizure, TBI or FH of seizures.    CURRENT ASM:  TPM 200mg BID - for headache and seizure  LEV ER 2000mg BID - started IR 10/2018, changed to ER 2022 d/t irritability   CLB 10mg BID – started 2022    PREVIOUS ASM:  Acetazolamide – for IIH, worsened seizures  2021 to 2022, inefficacious    IMAGING:   MRI w/o 2018 ([place]): Remonstration of status post Chiari I decompression. Otherwise unremarkable exam.    MRI w/wo 2017 ([facility]): The cerebellar tonsils extend 7 mm below the foramen magnum   and have a peglike configuration compatible with Chiari I malformation.     NEUROPHYSIOLOGY:  rEEG 22 (370): normal awake    NEUROPSYCHOLOGY:   None      PAST MEDICAL HISTORY:  Asthma  no recent exacerbations    Low back pain  chronic     Cervical disc disease  h/o cervical fusion    Irritable bowel syndrome without diarrhea  with constipation    Chiari I malformation  diagnsoed in ; decompression 2018    EDS (Su-Danlos syndrome)    Tethered cord syndrome    Seizure  onset 18, ER visit to [facility] . Was discharged on no anti seizure medications .    Syncope  episodes when flexes her neck forward    Craniovertebral instability    History of asthma  without exacerbations    Sinus tachycardia  on Metoprolol ( controlled )    Chiari syndrome  Arnold Chiari syndrome    Su-Danlos syndrome    Seizure disorder    Spinal surgery in prior 3 months    Chiari I malformation    Seizure    Thrombus  left sunclavian and cephalic vein   s/p eloquis x 1 year      PAST SURGICAL HISTORY:   S/P lumbar laminectomy  , complicated with CSF leak, S/P blood patch    H/O:       History of cholecystectomy  laparoscopic 2005    History of tonsillectomy  age 18    S/P myelogram  c/b CSF leak, spinal headache, S/P blood patch    History of back surgery  2017--  T12 vertebral column resection, T8-L2 instrumentation and fusion.    H/O craniotomy  16 suboccipital crani for C1 arch resection w posterior occipital cervical hardware fusion to C2 , was revised 18.    Seizure    Syncope    H/O cervical spine surgery    H/O lumbosacral spine surgery    H/O brain surgery    Tethered spinal cord  release  [facility]    S/P  shunt  placed  removed 2021 due to infection      MEDICATIONS  (STANDING):  aspirin  chewable 81 milliGRAM(s) Oral daily  cloBAZam 5 milliGRAM(s) Oral two times a day  enoxaparin Injectable 40 milliGRAM(s) SubCutaneous every 24 hours  famotidine    Tablet 40 milliGRAM(s) Oral daily  levETIRAcetam 1000 milliGRAM(s) Oral two times a day  loratadine 10 milliGRAM(s) Oral daily  morphine ER Tablet 15 milliGRAM(s) Oral two times a day  nicotine -  14 mG/24Hr(s) Patch 1 patch Transdermal daily  oxyCODONE    IR 10 milliGRAM(s) Oral three times a day  sertraline 50 milliGRAM(s) Oral daily  topiramate 100 milliGRAM(s) Oral two times a day      ALLERGIES:   Bee Stings (Anaphylaxis)  Keflex (Anaphylaxis)  latex (Rash)  penicillin (Unknown)  penicillins (Anaphylaxis)  TEGADERM (Unknown)    FAMILY HISTORY:  Family history of drug abuse    Family history of hepatitis C    Family history of coronary artery disease (Sibling)  sister has 4 stents dx age 38    SOCIAL HISTORY:   Cutting down in tobacco. No drug, EtOH.      ROS:  Neurology as per HPI, otherwise negative for constitutional, skin, eyes, ENT, respiratory, cardiovascular, gastrointestinal, genitourinary, musculoskeletal, psychiatric, hematology/lymphatics, endocrine, allergic/immunologic.    Vital Signs Last 24 Hrs  T(C): 36.7 (2022 17:48), Max: 36.7 (2022 17:48)  T(F): 98 (2022 17:48), Max: 98 (2022 17:48)  HR: 68 (2022 17:48) (68 - 68)  BP: 118/80 (2022 17:48) (118/80 - 118/80)  BP(mean): --  RR: 17 (2022 17:48) (17 - 17)  SpO2: 96% (2022 17:48) (96% - 96%)    GENERAL PHYSICAL EXAM:  GEN: no distress  HEENT: NCAT, OP clear  EYES: sclera white, conjunctiva clear, no nystagmus  NECK: supple  CV: RRR, no murmur     		  PULM: CTAB, no wheezing  ABD: soft ABD, +BS, NT  EXT: peripheral pulse intact, no cyanosis  MSK: muscle tone and strength normal  SKIN: warm, dry    NEUROLOGICAL EXAM:  Mental Status  Orientation: alert and oriented to person, place, time, and situation   Language: clear and fluent    Cranial Nerves  II: full visual fields intact   III, IV, VI: PERRL, EOMI  V, VII: facial sensation and movement intact and symmetric   VIII: hearing intact   IX, X: uvula midline, soft palate elevates normally   XI: BL shoulder shrug intact   XII: tongue midline    Motor  5/5 BUE and BLE                 Tone and bulk are normal in upper and lower limbs  No pronator drift    Sensation  Intact to light touch and pinprick in all 4 EXTs    Reflex  2+ in BL biceps and patella                                   Plantar responses downward bilaterally    Coordination  Normal FTN bilaterally    Gait  Deferred      LABS:   pending

## 2022-06-07 ENCOUNTER — TRANSCRIPTION ENCOUNTER (OUTPATIENT)
Age: 42
End: 2022-06-07

## 2022-06-07 PROCEDURE — 99233 SBSQ HOSP IP/OBS HIGH 50: CPT

## 2022-06-07 PROCEDURE — 95720 EEG PHY/QHP EA INCR W/VEEG: CPT

## 2022-06-07 PROCEDURE — 99232 SBSQ HOSP IP/OBS MODERATE 35: CPT

## 2022-06-07 RX ORDER — POTASSIUM CHLORIDE 20 MEQ
40 PACKET (EA) ORAL ONCE
Refills: 0 | Status: COMPLETED | OUTPATIENT
Start: 2022-06-07 | End: 2022-06-07

## 2022-06-07 RX ORDER — CLOBAZAM 10 MG/1
2.5 TABLET ORAL
Refills: 0 | Status: DISCONTINUED | OUTPATIENT
Start: 2022-06-07 | End: 2022-06-07

## 2022-06-07 RX ORDER — TOPIRAMATE 25 MG
50 TABLET ORAL
Refills: 0 | Status: DISCONTINUED | OUTPATIENT
Start: 2022-06-07 | End: 2022-06-08

## 2022-06-07 RX ORDER — CLOBAZAM 10 MG/1
2.5 TABLET ORAL
Refills: 0 | Status: DISCONTINUED | OUTPATIENT
Start: 2022-06-07 | End: 2022-06-08

## 2022-06-07 RX ORDER — LEVETIRACETAM 250 MG/1
500 TABLET, FILM COATED ORAL
Refills: 0 | Status: DISCONTINUED | OUTPATIENT
Start: 2022-06-07 | End: 2022-06-08

## 2022-06-07 RX ADMIN — LEVETIRACETAM 1000 MILLIGRAM(S): 250 TABLET, FILM COATED ORAL at 05:01

## 2022-06-07 RX ADMIN — Medication 40 MILLIEQUIVALENT(S): at 12:34

## 2022-06-07 RX ADMIN — OXYCODONE HYDROCHLORIDE 10 MILLIGRAM(S): 5 TABLET ORAL at 13:33

## 2022-06-07 RX ADMIN — Medication 50 MILLIGRAM(S): at 16:43

## 2022-06-07 RX ADMIN — CLOBAZAM 5 MILLIGRAM(S): 10 TABLET ORAL at 05:01

## 2022-06-07 RX ADMIN — LORATADINE 10 MILLIGRAM(S): 10 TABLET ORAL at 12:31

## 2022-06-07 RX ADMIN — MORPHINE SULFATE 15 MILLIGRAM(S): 50 CAPSULE, EXTENDED RELEASE ORAL at 16:43

## 2022-06-07 RX ADMIN — CLOBAZAM 2.5 MILLIGRAM(S): 10 TABLET ORAL at 18:18

## 2022-06-07 RX ADMIN — OXYCODONE HYDROCHLORIDE 10 MILLIGRAM(S): 5 TABLET ORAL at 05:01

## 2022-06-07 RX ADMIN — ENOXAPARIN SODIUM 40 MILLIGRAM(S): 100 INJECTION SUBCUTANEOUS at 16:43

## 2022-06-07 RX ADMIN — SERTRALINE 50 MILLIGRAM(S): 25 TABLET, FILM COATED ORAL at 12:30

## 2022-06-07 RX ADMIN — MORPHINE SULFATE 15 MILLIGRAM(S): 50 CAPSULE, EXTENDED RELEASE ORAL at 05:01

## 2022-06-07 RX ADMIN — Medication 81 MILLIGRAM(S): at 12:30

## 2022-06-07 RX ADMIN — Medication 100 MILLIGRAM(S): at 05:01

## 2022-06-07 RX ADMIN — LEVETIRACETAM 500 MILLIGRAM(S): 250 TABLET, FILM COATED ORAL at 16:42

## 2022-06-07 RX ADMIN — Medication 1 PATCH: at 12:30

## 2022-06-07 RX ADMIN — FAMOTIDINE 40 MILLIGRAM(S): 10 INJECTION INTRAVENOUS at 12:30

## 2022-06-07 RX ADMIN — OXYCODONE HYDROCHLORIDE 10 MILLIGRAM(S): 5 TABLET ORAL at 12:33

## 2022-06-07 RX ADMIN — OXYCODONE HYDROCHLORIDE 10 MILLIGRAM(S): 5 TABLET ORAL at 21:49

## 2022-06-07 NOTE — DISCHARGE NOTE PROVIDER - NSDCCPCAREPLAN_GEN_ALL_CORE_FT
PRINCIPAL DISCHARGE DIAGNOSIS  Diagnosis: Epilepsy  Assessment and Plan of Treatment: Kindly take all antiseizure medications as prescribed. It is very important that you never miss a dose to prevent you from having a breakthrough seizure. Be sure to look out for warnings or signs of seizure such as tongue biting, incontinence, shaking, eye rolling/ shaking, generalized shaking, twitching, and acute confusion or amnesia. If these things happen, please alert a healthcare professional immediately and/or come to the ER.      SECONDARY DISCHARGE DIAGNOSES  Diagnosis: Chronic pain  Assessment and Plan of Treatment: Continue home meds    Diagnosis: History of CVA (cerebrovascular accident)  Assessment and Plan of Treatment: Continue aspirin     PRINCIPAL DISCHARGE DIAGNOSIS  Diagnosis: Psychogenic nonepileptic seizure  Assessment and Plan of Treatment: Kindly take all antiseizure medications as prescribed (toprimate). It is very important that you never miss a dose to prevent you from having a breakthrough seizure. Be sure to look out for warnings or signs of seizure such as tongue biting, incontinence, shaking, eye rolling/ shaking, generalized shaking, twitching, and acute confusion or amnesia. If these things happen, please alert a healthcare professional immediately and/or come to the ER.      SECONDARY DISCHARGE DIAGNOSES  Diagnosis: Chronic pain  Assessment and Plan of Treatment: Continue home meds    Diagnosis: History of CVA (cerebrovascular accident)  Assessment and Plan of Treatment: Continue aspirin

## 2022-06-07 NOTE — DISCHARGE NOTE PROVIDER - PROVIDER TOKENS
PROVIDER:[TOKEN:[[id number]],FOLLOWUP:[1 week]],PROVIDER:[TOKEN:[[id number]],FOLLOWUP:[1-3 days]]

## 2022-06-07 NOTE — DISCHARGE NOTE PROVIDER - NSDCMRMEDTOKEN_GEN_ALL_CORE_FT
Albuterol (Eqv-ProAir HFA) 90 mcg/inh inhalation aerosol: 2 puff(s) inhaled every 6 hours, As Needed  aspirin 81 mg oral tablet: 1 tab(s) orally once a day  clonazePAM 1 mg oral tablet: 1 tab(s) orally 3 times a day, As Needed  Keppra 1000 mg oral tablet: 1 tab(s) orally 2 times a day  Keppra 500 mg oral tablet: 1 tab(s) orally once a day - mid day  morphine 12 to 24 hour extended release: 15 milligram(s) parenteral 2 times a day  Myrbetriq 50 mg oral tablet, extended release: 1 tab(s) orally once a day  oxyCODONE 5 mg oral tablet: 2 tab(s) orally 3 times a day  Pepcid 40 mg oral tablet: 1 tab(s) orally once a day, As Needed  tiZANidine 4 mg oral tablet: orally once a day  Topamax: 150 milligram(s) orally 2 times a day  Vimpat 200 mg oral tablet: 1 tab(s) orally 2 times a day  Xyzal 5 mg oral tablet: 1 tab(s) orally once a day (in the evening)  Zofran 4 mg oral tablet: 1 tab(s) orally every 8 hours, As Needed  Zoloft 50 mg oral tablet: 1 tab(s) orally once a day   Albuterol (Eqv-ProAir HFA) 90 mcg/inh inhalation aerosol: 2 puff(s) inhaled every 6 hours, As Needed  aspirin 81 mg oral tablet: 1 tab(s) orally once a day  morphine 12 to 24 hour extended release: 15 milligram(s) parenteral 2 times a day  Myrbetriq 50 mg oral tablet, extended release: 1 tab(s) orally once a day  oxyCODONE 5 mg oral tablet: 2 tab(s) orally 3 times a day  Pepcid 40 mg oral tablet: 1 tab(s) orally once a day, As Needed  tiZANidine 4 mg oral tablet: orally once a day  topiramate 50 mg oral tablet: 2 tab(s) orally 2 times a day   Xyzal 5 mg oral tablet: 1 tab(s) orally once a day (in the evening)  Zofran 4 mg oral tablet: 1 tab(s) orally every 8 hours, As Needed  Zoloft 50 mg oral tablet: 1 tab(s) orally once a day

## 2022-06-07 NOTE — DISCHARGE NOTE PROVIDER - NSDCFUSCHEDAPPT_GEN_ALL_CORE_FT
[facility]  NEUROLOGY [address] [place] [address] S  Scheduled Appointment: 08/17/2022

## 2022-06-07 NOTE — PROGRESS NOTE ADULT - SUBJECTIVE AND OBJECTIVE BOX
[facility]                                                                     MD Valery Choi DO                                              Epilepsy Consult #: [id number] ([phone])                                               Office: [address] [place] [address], [address]                                                 Phone: [phone]; Fax: [phone]                            ==============================================    EPILEPSY FOLLOW-UP NOTE      INTERVAL HISTORY:  Normal EEG thus far.    MEDICATIONS:   ALBUTerol    90 MICROgram(s) HFA Inhaler 2 Puff(s) Inhalation every 6 hours PRN Shortness of Breath and/or Wheezing  aspirin  chewable 81 milliGRAM(s) Oral daily  cloBAZam Suspension 2.5 milliGRAM(s) Oral two times a day  enoxaparin Injectable 40 milliGRAM(s) SubCutaneous every 24 hours  famotidine    Tablet 40 milliGRAM(s) Oral daily  levETIRAcetam 500 milliGRAM(s) Oral two times a day  loratadine 10 milliGRAM(s) Oral daily  LORazepam   Injectable 2 milliGRAM(s) IV Push once PRN Seizure activity  morphine ER Tablet 15 milliGRAM(s) Oral two times a day  nicotine -  14 mG/24Hr(s) Patch 1 patch Transdermal daily  oxyCODONE    IR 10 milliGRAM(s) Oral three times a day  sertraline 50 milliGRAM(s) Oral daily  topiramate 50 milliGRAM(s) Oral two times a day    LAST 24-HR VITALS:  T(C): 36.7 (06-07-22 @ 11:37), Max: 36.7 (06-06-22 @ 17:48)  HR: 62 (06-07-22 @ 11:37) (57 - 68)  BP: 91/59 (06-07-22 @ 11:37) (91/59 - 118/80)  ABP: --  RR: 18 (06-07-22 @ 11:37) (17 - 18)  SpO2: 97% (06-07-22 @ 11:37) (96% - 97%)  CVP(cm H2O): --    GENERAL PHYSICAL EXAM:  GEN: no distress   HEENT: NCAT, OP clear  EYES: sclera white, conjunctiva clear, no nystagmus  NECK: supple  CV: RRR, no murmur     		  PULM: CTAB, no wheezing  ABD: soft, +BS, NT  EXT: peripheral pulse intact, no cyanosis  MSK: muscle tone and strength normal  SKIN: warm, dry    NEUROLOGICAL EXAM:  Mental Status  Orientation: awake and alert  Language: clear     Cranial Nerves  II: full visual fields intact   III, IV, VI: PERRL, EOMI  V, VII: facial sensation and movement intact and symmetric   VIII: hearing intact   IX, X: uvula midline, soft palate elevates normally   XI: BL shoulder shrug intact   XII: tongue midline    Motor  5/5 BUE and BLE                 Tone and bulk are normal in upper and lower limbs  No pronator drift    Sensation  Intact to light touch and pinprick in all 4 EXTs    Reflex  2+ in BL biceps and patella                                    Plantar responses downward bilaterally    Coordination  Normal FTN bilaterally    Gait  Deferred       LABS:                          13.2   6.76  )-----------( 215      ( 06 Jun 2022 16:40 )             39.9     06-06    141  |  111<H>  |  15.0  ----------------------------<  99  3.5   |  20.0<L>  |  0.75    Ca    8.5<L>      06 Jun 2022 16:40    TPro  5.8<L>  /  Alb  3.7  /  TBili  0.2<L>  /  DBili  x   /  AST  16  /  ALT  28  /  AlkPhos  82  06-06    CAPILLARY BLOOD GLUCOSE        LIVER FUNCTIONS - ( 06 Jun 2022 16:40 )  Alb: 3.7 g/dL / Pro: 5.8 g/dL / ALK PHOS: 82 U/L / ALT: 28 U/L / AST: 16 U/L / GGT: x                 OTHER IMAGING AND STUDIES:    EMU 6/6/ - 6/7/22:  EEG Summary:  Normal EEG in the awake, drowsy and asleep states.    Impression/Clinical Correlate:  6/6 – 6/7: no event or seizure    Normal video EEG in awake, drowsy and asleep states.

## 2022-06-07 NOTE — PROGRESS NOTE ADULT - ASSESSMENT
42yr old with PMH of multiple spine surgeries, intracranial hypertension s/p  shunt - s/p removal 2/2 infection, Su-Danlos syndrome, lacunar infarct in the left thalamus, anxiety and depression, chronic back pain, seizures presents for video EEG for uncontrolled Seizures on current AEDs.     # seizures -ct Video EEG   AEDs management per neuro     # chronic back pain with multiple spine surgeries - on morphine ER 15 BID , oxy 10 tid , tizanidine  # Tobacco abuse - nicotine patch to assist   # asthma - not in flare - ct alb inhaler prn   # Hx CVA - on aspirin   # VTE x - lovenox  Possible DC in am

## 2022-06-07 NOTE — DISCHARGE NOTE PROVIDER - HOSPITAL COURSE
42 year-old female with PMHx of multiple spinal surgeries, intracranial hypertension s/p  shunt (s/p removal due to infection), Su-Danlos syndrome, lacunar infarct in the left thalamus, anxiety, depression, chronic back pain, & seizures, who presented to EMU to differentiate epileptic from nonepileptic events given increasing frequency of seizures refractory to antiepileptic medications. Patient's medications were adjusted per epilepsy, and she was monitored on cvEEG which revealed ___________ 42 year-old female with PMHx of multiple spinal surgeries, intracranial hypertension s/p  shunt (s/p removal due to infection), Su-Danlos syndrome, lacunar infarct in the left thalamus, anxiety, depression, chronic back pain, & seizures, who presented to EMU to differentiate epileptic from nonepileptic events given increasing frequency of seizures refractory to antiepileptic medications. Patient's medications were adjusted per epilepsy, and she was monitored on cvEEG which revealed a normal EEG.

## 2022-06-07 NOTE — PROGRESS NOTE ADULT - SUBJECTIVE AND OBJECTIVE BOX
HAM MCDOWELL    [id number]    42y      Female    CC: seizures  offers no complaints    INTERVAL HPI/OVERNIGHT EVENTS: no acute events     REVIEW OF SYSTEMS:    CONSTITUTIONAL: No fever, fatigue  RESPIRATORY: No cough, wheezing,  No shortness of breath  CARDIOVASCULAR: No chest pain, palpitations  GASTROINTESTINAL: No abdominal or epigastric pain. No nausea, vomiting        Vital Signs Last 24 Hrs  T(C): 36.7 (07 Jun 2022 11:37), Max: 36.7 (06 Jun 2022 17:48)  T(F): 98.1 (07 Jun 2022 11:37), Max: 98.1 (07 Jun 2022 11:37)  HR: 62 (07 Jun 2022 11:37) (57 - 68)  BP: 91/59 (07 Jun 2022 11:37) (91/59 - 118/80)  BP(mean): --  RR: 18 (07 Jun 2022 11:37) (17 - 18)  SpO2: 97% (07 Jun 2022 11:37) (96% - 97%)    PHYSICAL EXAM:    GENERAL: NAD, well-groomed  HEENT: PERRL, +EOMI  NECK: soft, Supple, No JVD   CHEST/LUNG: Clear to percussion bilaterally; No wheezing  HEART: S1S2+, Regular rate and rhythm; No murmur  ABDOMEN: Soft, Nontender, Nondistended; Bowel sounds present  EXTREMITIES:  No clubbing, cyanosis, or edema  SKIN: No rashes or lesions  NEURO: AAOX3        LABS:                        13.2   6.76  )-----------( 215      ( 06 Jun 2022 16:40 )             39.9     06-06    141  |  111<H>  |  15.0  ----------------------------<  99  3.5   |  20.0<L>  |  0.75    Ca    8.5<L>      06 Jun 2022 16:40    TPro  5.8<L>  /  Alb  3.7  /  TBili  0.2<L>  /  DBili  x   /  AST  16  /  ALT  28  /  AlkPhos  82  06-06            MEDICATIONS  (STANDING):  aspirin  chewable 81 milliGRAM(s) Oral daily  cloBAZam Suspension 2.5 milliGRAM(s) Oral two times a day  enoxaparin Injectable 40 milliGRAM(s) SubCutaneous every 24 hours  famotidine    Tablet 40 milliGRAM(s) Oral daily  levETIRAcetam 500 milliGRAM(s) Oral two times a day  loratadine 10 milliGRAM(s) Oral daily  morphine ER Tablet 15 milliGRAM(s) Oral two times a day  nicotine -  14 mG/24Hr(s) Patch 1 patch Transdermal daily  oxyCODONE    IR 10 milliGRAM(s) Oral three times a day  potassium chloride   Powder 40 milliEquivalent(s) Oral once  sertraline 50 milliGRAM(s) Oral daily  topiramate 50 milliGRAM(s) Oral two times a day    MEDICATIONS  (PRN):  ALBUTerol    90 MICROgram(s) HFA Inhaler 2 Puff(s) Inhalation every 6 hours PRN Shortness of Breath and/or Wheezing  LORazepam   Injectable 2 milliGRAM(s) IV Push once PRN Seizure activity      RADIOLOGY & ADDITIONAL TESTS:

## 2022-06-07 NOTE — DISCHARGE NOTE PROVIDER - ATTENDING DISCHARGE PHYSICAL EXAMINATION:
PHYSICAL EXAM:    GENERAL: NAD, well-groomed, well-developed  HEAD:  Atraumatic, Normocephalic  EYES: EOMI, PERRLA, conjunctiva and sclera clear  ENMT:  Moist mucous membranes  NECK: Supple  NERVOUS SYSTEM:  Alert & Oriented X3, Good concentration  CHEST/LUNG: Clear to percussion bilaterally; No rales, rhonchi  HEART: Regular rate and rhythm; No murmurs  EXTREMITIES:   No clubbing, cyanosis, or edema

## 2022-06-07 NOTE — DISCHARGE NOTE PROVIDER - CARE PROVIDER_API CALL
[facility])  EEGEpilepsy; Neurology  [address] [place] [address]  Phone: [phone]  Fax: [phone]  Follow Up Time: 1 week    Johny Villegas)  [facility]  [address] [place] [address]  Phone: [phone]  Fax: [phone]  Follow Up Time: 1-3 days

## 2022-06-07 NOTE — EEG REPORT - NS EEG TEXT BOX
[facility]  Epilepsy Monitoring Unit Report    [facility]: [address], [place] [address], Phone [phone]  [place] Office: [address], [address], [place] [address] Phone [phone]    [facility]: [address] [place] [address], Phone [phone]  [place] Office: [address] [place] [address], Phone [phone]    Patient Name: Dilma Aranda    Age: 42 year, : 1980  Patient ID: -, MRN #: -, Smart: -    Physician Ordering Inpatient EEG: Dr. Orr  Referral Source to EMU: elective admission – Dr. Willingham    EMU Study Started: 12:21 on 22    EMU Study Ended: pending discharge    Study Information:    EEG Recording Technique:  The patient underwent continuous Video-EEG monitoring, using Telemetry System hardware on the XLTek Digital System. EEG and video data were stored on a computer hard drive with important events saved in digital archive files. The material was reviewed by a physician (electroencephalographer / epileptologist) on a daily basis. Kevin and seizure detection algorithms were utilized and reviewed. An EEG Technician attended to the patient, and was available throughout daytime work hours.  The epilepsy center neurologist was available in person or on call 24-hours per day.    EEG Placement and Labeling of Electrodes:  The EEG was performed utilizing 20 channel referential EEG connections (coronal over temporal over parasagittal montage) using all standard 10-20 electrode placements with EKG, with additional electrodes placed in the inferior temporal region using the modified 10-10 montage electrode placements for elective admissions, or if deemed necessary. Recording was at a sampling rate of 256 samples per second per channel. Time synchronized digital video recording was done simultaneously with EEG recording. A low light infrared camera was used for low light recording.               History:  This is a 42 year-old RH female with a history of severe LBP radiation to R>LLE s/p L5-S1 laminectomy/discectomy  (Dr. Jones at [facility]), arachnoiditis and tethering of the nerve roots s/p spinal stimulator placement 2014 and later removal 2016 (Dr. Gutierrez), tethered cord syndrome s/p T12 removal and T8-L2 fusion 2017 (Dr. Murray), Chiari malformation s/p suboccipital craniectomy and C1 laminectomy and placement of bilateral occipital condyle-C2 posterior craniocervical instrumentation and fusion for craniocervical instability 2018 (Dr. Murray) and tonsillar shrinkage 2018 (Dr. Diego), IIH s/p VPS 2020 c/b catheter infection and removal 2021 (Dr. Diego), Su-Danlos syndrome, lacunar infarct in the left thalamus, anxiety and depression who presents to EMU to differentiate epileptic from nonepileptic events.    Seizure onset 2018. Mother heard thud and saw pt convulsing x10m. Since then, has been having frequent seizures refractory to multiple ASM. Never had prolonged EEG recording. Admitted to EMU to better characterize these events.    SEIZURE/SPELL DESCRIPTION AND TYPE:  Type #1  Severity: aura  Onset:   Quality & associated signs/symptoms: Indescribable feeling, change in behavior/facial expression   Duration: 10-15m  Timing: once a week  Modifying factors: unknown trigger   Circadian variation: none    Type #2  Severity: convulsion  Onset:   Quality & associated signs/symptoms: type #1 -> LOC, full convulsion  Duration: aura x 10-15m -> convulsion x2-3m  Timing: once a day for a few days, q 2-3 weeks   Modifying factors: unknown trigger   Circadian variation: none    EPILEPSY TYPE: focal epilepsy VS PNES  HISTORY OF TONIC-CLONIC SZ: yes   HISTORY OF STATUS EPILEPTICUS: yes     SEIZURE RISK FACTORS:  CM s/p suboccipital craniectomy and decompression. VPS s/p removal 2021 d/t catheter infection, and IV vancomycin x4wks. Patient was a product of normal pregnancy and delivery. No history of febrile seizure, TBI or FH of seizures.    CURRENT ASM:  TPM 200mg BID - for headache and seizure  LEV ER 2000mg BID - started IR 10/2018, changed to ER 2022 d/t irritability   CLB 10mg BID – started 2022    PREVIOUS ASM:  Acetazolamide – for IIH, worsened seizures  LCM -  to 2022, inefficacious    IMAGING:   MRI w/o 2018 ([place]): Remonstration of status post Chiari I decompression. Otherwise unremarkable exam.    MRI w/wo 2017 ([facility]): The cerebellar tonsils extend 7 mm below the foramen magnum   and have a peglike configuration compatible with Chiari I malformation.     NEUROPHYSIOLOGY:  rEEG 22 (370): normal awake    NEUROPSYCHOLOGY:   None    Home Antiseizure Medication and Device  TPM 200mg BID - for headache and seizure  LEV ER 2000mg BID   CLB 10mg BID    Interpretation:    Start Date: 2022 – Day 1                                Start Time – 12:21       Duration – 19h 12m    Daily EEG Visual Analysis  Findings: The background was continuous and reactive. During wakefulness, the posterior dominant rhythm consisted of symmetric, well-modulated xx Hz activity, with amplitude to 30 uV, that attenuated to eye opening.     Background Slowing:  No generalized background slowing was present.    Focal Slowing:   None were present.    Sleep Background:  Drowsiness was characterized by fragmentation, attenuation, and slowing of the background activity.    Sleep was characterized by the presence of vertex waves, symmetric sleep spindles and K-complexes.    Other Non-Epileptiform Findings:  None were present.    Interictal Epileptiform Activity:   None were present.    Events:  No events or seizures recorded.    ECG:  The heart rate on single channel ECG was predominantly between 60-70 BPM.    ASM:  /100mg, LEV 2000/1000mg, CLB 10/5mg    EEG Summary:  Normal EEG in the awake, drowsy and asleep states.    Impression/Clinical Correlate:   – : no event or seizure    Normal video EEG in awake, drowsy and asleep states.    ________________________________________    Bismark Willingham MD  Director, Epilepsy/EMU - [facility]

## 2022-06-07 NOTE — PROGRESS NOTE ADULT - ASSESSMENT
42 year-old RH female with a history of severe LBP radiation to R>LLE s/p L5-S1 laminectomy/discectomy 2012 (Dr. Jones at [facility]), arachnoiditis and tethering of the nerve roots s/p spinal stimulator placement 6/2014 and later removal 8/2016 (Dr. Gutierrez), tethered cord syndrome s/p T12 removal and T8-L2 fusion 5/2017 (Dr. 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 Murray) and tonsillar shrinkage 8/2018 (Dr. Diego), IIH s/p VPS 2/2020 c/b catheter infection and removal 11/2021 (Dr. Diego), Su-Danlos syndrome, lacunar infarct in the left thalamus, anxiety and depression who presents to EMU to differentiate epileptic from nonepileptic events. Personally reviewed all imagines, labs, EEG and other studies.      Recommendation:  - cvEEG  - awaiting event  - decrease topiramate 100mg BID to 50mg BID (ordered)   - decrease levetiracetam 1000mg BID to 500mg BID (ordered)   - decrease clobazam 5mg BID to 2.5mg BID (ordered)   - if patient has a convulsive event: call me (8am-10pm: [phone]) or on-call Epilepsy fellow Dr. Moreland (10pm-8am: [phone]) to review EEG prior to giving any meds       => if convulsive event is nonepileptic: do not give medication      => if convulsive event is epileptic: lorazepam IV 2mg, levetiracetam 2000mg IV  - tele monitor  - OOB only with supervision and assist  - seizure precaution        Will continue to follow with you.   ______________________  Bismark Willingham MD   Director, Epilepsy/EMU - [facility]   Epilepsy Consult #: 83-EPILEPSY ([phone])

## 2022-06-07 NOTE — DISCHARGE NOTE PROVIDER - CARE PROVIDERS DIRECT ADDRESSES
[email or web address]

## 2022-06-08 ENCOUNTER — TRANSCRIPTION ENCOUNTER (OUTPATIENT)
Age: 42
End: 2022-06-08

## 2022-06-08 VITALS
HEART RATE: 76 BPM | DIASTOLIC BLOOD PRESSURE: 73 MMHG | SYSTOLIC BLOOD PRESSURE: 118 MMHG | OXYGEN SATURATION: 99 % | TEMPERATURE: 98 F | RESPIRATION RATE: 17 BRPM

## 2022-06-08 PROCEDURE — 85027 COMPLETE CBC AUTOMATED: CPT

## 2022-06-08 PROCEDURE — 99239 HOSP IP/OBS DSCHRG MGMT >30: CPT

## 2022-06-08 PROCEDURE — 95711 VEEG 2-12 HR UNMONITORED: CPT

## 2022-06-08 PROCEDURE — 95700 EEG CONT REC W/VID EEG TECH: CPT

## 2022-06-08 PROCEDURE — 80053 COMPREHEN METABOLIC PANEL: CPT

## 2022-06-08 PROCEDURE — 36415 COLL VENOUS BLD VENIPUNCTURE: CPT

## 2022-06-08 PROCEDURE — 99233 SBSQ HOSP IP/OBS HIGH 50: CPT

## 2022-06-08 PROCEDURE — 95718 EEG PHYS/QHP 2-12 HR W/VEEG: CPT

## 2022-06-08 PROCEDURE — 95714 VEEG EA 12-26 HR UNMNTR: CPT

## 2022-06-08 RX ORDER — TOPIRAMATE 25 MG
150 TABLET ORAL
Qty: 0 | Refills: 0 | DISCHARGE

## 2022-06-08 RX ORDER — CLONAZEPAM 1 MG
1 TABLET ORAL
Qty: 0 | Refills: 0 | DISCHARGE

## 2022-06-08 RX ORDER — LACOSAMIDE 50 MG/1
1 TABLET ORAL
Qty: 0 | Refills: 0 | DISCHARGE

## 2022-06-08 RX ORDER — TOPIRAMATE 25 MG
2 TABLET ORAL
Qty: 120 | Refills: 0
Start: 2022-06-08 | End: 2022-07-07

## 2022-06-08 RX ORDER — LEVETIRACETAM 250 MG/1
1 TABLET, FILM COATED ORAL
Qty: 0 | Refills: 0 | DISCHARGE

## 2022-06-08 RX ORDER — LEVETIRACETAM 250 MG/1
250 TABLET, FILM COATED ORAL
Refills: 0 | Status: DISCONTINUED | OUTPATIENT
Start: 2022-06-08 | End: 2022-06-08

## 2022-06-08 RX ADMIN — Medication 50 MILLIGRAM(S): at 05:55

## 2022-06-08 RX ADMIN — MORPHINE SULFATE 15 MILLIGRAM(S): 50 CAPSULE, EXTENDED RELEASE ORAL at 05:55

## 2022-06-08 RX ADMIN — Medication 81 MILLIGRAM(S): at 12:28

## 2022-06-08 RX ADMIN — FAMOTIDINE 40 MILLIGRAM(S): 10 INJECTION INTRAVENOUS at 12:28

## 2022-06-08 RX ADMIN — CLOBAZAM 2.5 MILLIGRAM(S): 10 TABLET ORAL at 05:54

## 2022-06-08 RX ADMIN — LEVETIRACETAM 500 MILLIGRAM(S): 250 TABLET, FILM COATED ORAL at 05:54

## 2022-06-08 RX ADMIN — LORATADINE 10 MILLIGRAM(S): 10 TABLET ORAL at 12:28

## 2022-06-08 RX ADMIN — Medication 1 PATCH: at 13:28

## 2022-06-08 RX ADMIN — OXYCODONE HYDROCHLORIDE 10 MILLIGRAM(S): 5 TABLET ORAL at 05:55

## 2022-06-08 RX ADMIN — SERTRALINE 50 MILLIGRAM(S): 25 TABLET, FILM COATED ORAL at 12:28

## 2022-06-08 NOTE — DISCHARGE NOTE NURSING/CASE MANAGEMENT/SOCIAL WORK - NSDCPEFALRISK_GEN_ALL_CORE
For information on Fall & Injury Prevention, visit: [email or web address] OR  [email or web address] OR  https://www.cdc.gov/steadi/patient.html

## 2022-06-08 NOTE — PROGRESS NOTE ADULT - SUBJECTIVE AND OBJECTIVE BOX
[facility]                                                                     MD Valery Choi DO                                              Epilepsy Consult #: [phone] ([phone])                                               Office: [address] [place] [address], [address]                                                 Phone: [phone]; Fax: [phone]                            ==============================================    EPILEPSY FOLLOW-UP NOTE      INTERVAL HISTORY:  No event overnight.     MEDICATIONS  (STANDING):  aspirin  chewable 81 milliGRAM(s) Oral daily  enoxaparin Injectable 40 milliGRAM(s) SubCutaneous every 24 hours  famotidine    Tablet 40 milliGRAM(s) Oral daily  levETIRAcetam 250 milliGRAM(s) Oral two times a day  loratadine 10 milliGRAM(s) Oral daily  morphine ER Tablet 15 milliGRAM(s) Oral two times a day  nicotine -  14 mG/24Hr(s) Patch 1 patch Transdermal daily  oxyCODONE    IR 10 milliGRAM(s) Oral three times a day  sertraline 50 milliGRAM(s) Oral daily    Vital Signs Last 24 Hrs  T(C): 36.9 (08 Jun 2022 11:12), Max: 36.9 (08 Jun 2022 11:12)  T(F): 98.5 (08 Jun 2022 11:12), Max: 98.5 (08 Jun 2022 11:12)  HR: 73 (08 Jun 2022 11:12) (63 - 73)  BP: 94/64 (08 Jun 2022 11:12) (94/64 - 109/72)  BP(mean): --  RR: 18 (08 Jun 2022 11:12) (16 - 19)  SpO2: 98% (08 Jun 2022 11:12) (95% - 98%)    GENERAL PHYSICAL EXAM:  GEN: no distress   HEENT: NCAT, OP clear  EYES: sclera white, conjunctiva clear, no nystagmus  NECK: supple  CV: RRR, no murmur     		  PULM: CTAB, no wheezing  ABD: soft, +BS, NT  EXT: peripheral pulse intact, no cyanosis  MSK: muscle tone and strength normal  SKIN: warm, dry    NEUROLOGICAL EXAM:  Mental Status  Orientation: awake and alert  Language: clear     Cranial Nerves  II: full visual fields intact   III, IV, VI: PERRL, EOMI  V, VII: facial sensation and movement intact and symmetric   VIII: hearing intact   IX, X: uvula midline, soft palate elevates normally   XI: BL shoulder shrug intact   XII: tongue midline    Motor  5/5 BUE and BLE                 Tone and bulk are normal in upper and lower limbs  No pronator drift    Sensation  Intact to light touch and pinprick in all 4 EXTs    Reflex  2+ in BL biceps and patella                                    Plantar responses downward bilaterally    Coordination  Normal FTN bilaterally    Gait  Deferred       LABS:                          13.2   6.76  )-----------( 215      ( 06 Jun 2022 16:40 )             39.9     06-06    141  |  111<H>  |  15.0  ----------------------------<  99  3.5   |  20.0<L>  |  0.75    Ca    8.5<L>      06 Jun 2022 16:40    TPro  5.8<L>  /  Alb  3.7  /  TBili  0.2<L>  /  DBili  x   /  AST  16  /  ALT  28  /  AlkPhos  82  06-06    CAPILLARY BLOOD GLUCOSE        LIVER FUNCTIONS - ( 06 Jun 2022 16:40 )  Alb: 3.7 g/dL / Pro: 5.8 g/dL / ALK PHOS: 82 U/L / ALT: 28 U/L / AST: 16 U/L / GGT: x                 OTHER IMAGING AND STUDIES:    EMU 6/6/ - 6/8/22:  EEG Summary:  Normal EEG in the awake, drowsy and asleep states.    Impression/Clinical Correlate:  6/6 – 6/7: no event or seizure  6/7 – 6/8: no event or seizure    Normal video EEG in awake, drowsy and asleep states.                                                                      [facility]                                                                     MD Valery Choi DO                                              Epilepsy Consult #: [phone] ([phone])                                               Office: [address] [place] [address], [address]                                                 Phone: [phone]; Fax: [phone]                            ==============================================    EPILEPSY FOLLOW-UP NOTE      INTERVAL HISTORY:  Three habitual events captured, consistent with psychogenic non-epileptic seizure (PNES).    MEDICATIONS  (STANDING):  aspirin  chewable 81 milliGRAM(s) Oral daily  enoxaparin Injectable 40 milliGRAM(s) SubCutaneous every 24 hours  famotidine    Tablet 40 milliGRAM(s) Oral daily  levETIRAcetam 250 milliGRAM(s) Oral two times a day  loratadine 10 milliGRAM(s) Oral daily  morphine ER Tablet 15 milliGRAM(s) Oral two times a day  nicotine -  14 mG/24Hr(s) Patch 1 patch Transdermal daily  oxyCODONE    IR 10 milliGRAM(s) Oral three times a day  sertraline 50 milliGRAM(s) Oral daily    Vital Signs Last 24 Hrs  T(C): 36.9 (08 Jun 2022 11:12), Max: 36.9 (08 Jun 2022 11:12)  T(F): 98.5 (08 Jun 2022 11:12), Max: 98.5 (08 Jun 2022 11:12)  HR: 73 (08 Jun 2022 11:12) (63 - 73)  BP: 94/64 (08 Jun 2022 11:12) (94/64 - 109/72)  BP(mean): --  RR: 18 (08 Jun 2022 11:12) (16 - 19)  SpO2: 98% (08 Jun 2022 11:12) (95% - 98%)    GENERAL PHYSICAL EXAM:  GEN: no distress   HEENT: NCAT, OP clear  EYES: sclera white, conjunctiva clear, no nystagmus  NECK: supple  CV: RRR, no murmur     		  PULM: CTAB, no wheezing  ABD: soft, +BS, NT  EXT: peripheral pulse intact, no cyanosis  MSK: muscle tone and strength normal  SKIN: warm, dry    NEUROLOGICAL EXAM:  Mental Status  Orientation: awake and alert  Language: clear     Cranial Nerves  II: full visual fields intact   III, IV, VI: PERRL, EOMI  V, VII: facial sensation and movement intact and symmetric   VIII: hearing intact   IX, X: uvula midline, soft palate elevates normally   XI: BL shoulder shrug intact   XII: tongue midline    Motor  5/5 BUE and BLE                 Tone and bulk are normal in upper and lower limbs  No pronator drift    Sensation  Intact to light touch and pinprick in all 4 EXTs    Reflex  2+ in BL biceps and patella                                    Plantar responses downward bilaterally    Coordination  Normal FTN bilaterally    Gait  Deferred       LABS:                          13.2   6.76  )-----------( 215      ( 06 Jun 2022 16:40 )             39.9     06-06    141  |  111<H>  |  15.0  ----------------------------<  99  3.5   |  20.0<L>  |  0.75    Ca    8.5<L>      06 Jun 2022 16:40    TPro  5.8<L>  /  Alb  3.7  /  TBili  0.2<L>  /  DBili  x   /  AST  16  /  ALT  28  /  AlkPhos  82  06-06    CAPILLARY BLOOD GLUCOSE        LIVER FUNCTIONS - ( 06 Jun 2022 16:40 )  Alb: 3.7 g/dL / Pro: 5.8 g/dL / ALK PHOS: 82 U/L / ALT: 28 U/L / AST: 16 U/L / GGT: x                 OTHER IMAGING AND STUDIES:    U 6/6 - 6/8/22:  EEG Summary:  Normal EEG in the awake, drowsy and asleep states.  - Three events of interest captured and consistent with PNES.    Impression/Clinical Correlate:  6/6 – 6/7: no event or seizure  6/7 – 6/8: 3 habitual events     Findings consistent with PNES. Normal video EEG in awake, drowsy and asleep states.

## 2022-06-08 NOTE — PROGRESS NOTE ADULT - ASSESSMENT
42 year-old RH female with a history of severe LBP radiation to R>LLE s/p L5-S1 laminectomy/discectomy 2012 (Dr. Jones at [facility]), arachnoiditis and tethering of the nerve roots s/p spinal stimulator placement 6/2014 and later removal 8/2016 (Dr. Gutierrez), tethered cord syndrome s/p T12 removal and T8-L2 fusion 5/2017 (Dr. 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 Murray) and tonsillar shrinkage 8/2018 (Dr. Diego), IIH s/p VPS 2/2020 c/b catheter infection and removal 11/2021 (Dr. Diego), Su-Danlos syndrome, lacunar infarct in the left thalamus, anxiety and depression who presents to EMU to differentiate epileptic from nonepileptic events. Personally reviewed all imagines, labs, EEG and other studies.      Recommendation:  - cvEEG  - awaiting event  - discontinue topiramate 50mg BID (ordered)   - discontinue clobazam 2.5mg BID (ordered)   - decrease levetiracetam 500mg BID to 250mg BID (ordered)   - if patient has a convulsive event: call me (8am-10pm: [phone]) or on-call Epilepsy fellow Dr. Moreland (10pm-8am: [phone]) to review EEG prior to giving any meds       => if convulsive event is nonepileptic: do not give medication      => if convulsive event is epileptic: lorazepam IV 2mg, levetiracetam 2000mg IV  - tele monitor  - OOB only with supervision and assist  - seizure precaution        Will continue to follow with you.   ______________________  Bismark Willingham MD   Director, Epilepsy/EMU - [facility]   Epilepsy Consult #: [phone] ([phone])  42 year-old RH female with a history of severe LBP radiation to R>LLE s/p L5-S1 laminectomy/discectomy 2012 (Dr. Jones at [facility]), arachnoiditis and tethering of the nerve roots s/p spinal stimulator placement 6/2014 and later removal 8/2016 (Dr. Gutierrez), tethered cord syndrome s/p T12 removal and T8-L2 fusion 5/2017 (Dr. 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 Murray) and tonsillar shrinkage 8/2018 (Dr. Diego), IIH s/p VPS 2/2020 c/b catheter infection and removal 11/2021 (Dr. Diego), Su-Danlos syndrome, lacunar infarct in the left thalamus, anxiety and depression who presents to EMU to differentiate epileptic from nonepileptic events. Personally reviewed all imagines, labs, EEG and other studies.    Diagnosed with psychogenic non-epileptic seizure (PNES). Educated patient on PNES. Patient amenable to the diagnosis. Tapered off all antiseizure medication, except topiramate, which is used for headache ppx.    Recommendation:  - discontinue cvEEG  - decrease topiramate home dose of 200mg BID to 100mg BID (for headache ppx)   - will not be resuming clobazam  - will not be resuming levetiracetam    - recommended patient to establish care with psychiatry for outpatient cognitive behavioral therapy (CBT)   - anticipate discharge home today      No acute intervention from Epilepsy at this time.  Please reconsult if needed.   ______________________  Bismark Willingham MD   Director, Epilepsy/EMU - [facility]   Epilepsy Consult #: [phone]-EPILEPSY ([phone])

## 2022-06-08 NOTE — EEG REPORT - NS EEG TEXT BOX
[facility]  Epilepsy Monitoring Unit Report    [facility]: [address], [place] [address], Phone [phone]  [place] Office: [address], [address], [place] [address] Phone [phone]    [facility]: [address] [place] [address], Phone [phone]  [place] Office: [address] [place] [address], Phone [phone]    Patient Name: Dilma Aranda    Age: 42 year, : 1980  Patient ID: -, MRN #: -, Smart: -    Physician Ordering Inpatient EEG: Dr. Orr  Referral Source to EMU: elective admission – Dr. Willingham    EMU Study Started: 12:21 on 22    EMU Study Ended: pending discharge    Study Information:    EEG Recording Technique:  The patient underwent continuous Video-EEG monitoring, using Telemetry System hardware on the XLTek Digital System. EEG and video data were stored on a computer hard drive with important events saved in digital archive files. The material was reviewed by a physician (electroencephalographer / epileptologist) on a daily basis. Kevin and seizure detection algorithms were utilized and reviewed. An EEG Technician attended to the patient, and was available throughout daytime work hours.  The epilepsy center neurologist was available in person or on call 24-hours per day.    EEG Placement and Labeling of Electrodes:  The EEG was performed utilizing 20 channel referential EEG connections (coronal over temporal over parasagittal montage) using all standard 10-20 electrode placements with EKG, with additional electrodes placed in the inferior temporal region using the modified 10-10 montage electrode placements for elective admissions, or if deemed necessary. Recording was at a sampling rate of 256 samples per second per channel. Time synchronized digital video recording was done simultaneously with EEG recording. A low light infrared camera was used for low light recording.               History:  This is a 42 year-old RH female with a history of severe LBP radiation to R>LLE s/p L5-S1 laminectomy/discectomy  (Dr. Jones at [facility]), arachnoiditis and tethering of the nerve roots s/p spinal stimulator placement 2014 and later removal 2016 (Dr. Gutierrez), tethered cord syndrome s/p T12 removal and T8-L2 fusion 2017 (Dr. Murray), Chiari malformation s/p suboccipital craniectomy and C1 laminectomy and placement of bilateral occipital condyle-C2 posterior craniocervical instrumentation and fusion for craniocervical instability 2018 (Dr. Murray) and tonsillar shrinkage 2018 (Dr. Diego), IIH s/p VPS 2020 c/b catheter infection and removal 2021 (Dr. Diego), Su-Danlos syndrome, lacunar infarct in the left thalamus, anxiety and depression who presents to EMU to differentiate epileptic from nonepileptic events.    Seizure onset 2018. Mother heard thud and saw pt convulsing x10m. Since then, has been having frequent seizures refractory to multiple ASM. Never had prolonged EEG recording. Admitted to EMU to better characterize these events.    SEIZURE/SPELL DESCRIPTION AND TYPE:  Type #1  Severity: aura  Onset:   Quality & associated signs/symptoms: Indescribable feeling, change in behavior/facial expression   Duration: 10-15m  Timing: once a week  Modifying factors: unknown trigger   Circadian variation: none    Type #2  Severity: convulsion  Onset:   Quality & associated signs/symptoms: type #1 -> LOC, full convulsion  Duration: aura x 10-15m -> convulsion x2-3m  Timing: once a day for a few days, q 2-3 weeks   Modifying factors: unknown trigger   Circadian variation: none    EPILEPSY TYPE: focal epilepsy VS PNES  HISTORY OF TONIC-CLONIC SZ: yes   HISTORY OF STATUS EPILEPTICUS: yes     SEIZURE RISK FACTORS:  CM s/p suboccipital craniectomy and decompression. VPS s/p removal 2021 d/t catheter infection, and IV vancomycin x4wks. Patient was a product of normal pregnancy and delivery. No history of febrile seizure, TBI or FH of seizures.    CURRENT ASM:  TPM 200mg BID - for headache and seizure  LEV ER 2000mg BID - started IR 10/2018, changed to ER 2022 d/t irritability   CLB 10mg BID – started 2022    PREVIOUS ASM:  Acetazolamide – for IIH, worsened seizures  LCM -  to 2022, inefficacious    IMAGING:   MRI w/o 2018 ([place]): Remonstration of status post Chiari I decompression. Otherwise unremarkable exam.    MRI w/wo 2017 ([facility]): The cerebellar tonsils extend 7 mm below the foramen magnum   and have a peglike configuration compatible with Chiari I malformation.     NEUROPHYSIOLOGY:  rEEG 22 (370): normal awake    NEUROPSYCHOLOGY:   None    Home Antiseizure Medication and Device  TPM 200mg BID - for headache and seizure  LEV ER 2000mg BID   CLB 10mg BID    Interpretation:    Start Date: 2022 – Day 1                                Start Time – 12:21       Duration – 19h 12m    Daily EEG Visual Analysis  Findings: The background was continuous and reactive. During wakefulness, the posterior dominant rhythm consisted of symmetric, well-modulated xx Hz activity, with amplitude to 30 uV, that attenuated to eye opening.     Background Slowing:  No generalized background slowing was present.    Focal Slowing:   None were present.    Sleep Background:  Drowsiness was characterized by fragmentation, attenuation, and slowing of the background activity.    Sleep was characterized by the presence of vertex waves, symmetric sleep spindles and K-complexes.    Other Non-Epileptiform Findings:  None were present.    Interictal Epileptiform Activity:   None were present.    Events:  No events or seizures recorded.    ECG:  The heart rate on single channel ECG was predominantly between 60-70 BPM.    ASM:  /100mg, LEV 2000/1000mg, CLB 10/5mg    Start Date: 2022 – Day 2                                       Start Time – 08:00      Duration – 24h    Daily EEG Visual Analysis  FINDINGS:  The background, artifacts and HR on single channel ECG were similar as previous day.    Interictal Epileptiform Activity:   None were present.    Events:  No events or seizures recorded.    ASM:  /50mg, LEV 1000/500mg, CLB 5/2.5mg    EEG Summary:  Normal EEG in the awake, drowsy and asleep states.    Impression/Clinical Correlate:   – : no event or seizure   – : no event or seizure    Normal video EEG in awake, drowsy and asleep states.    ________________________________________    Bismark Willingham MD  Director, Epilepsy/EMU - [facility]    [facility]  Epilepsy Monitoring Unit Report    [facility]: [address], [place] [address], Phone [phone]  [place] Office: [address], [address], [place] [address] Phone [phone]    [facility]: [address] [place] [address], Phone [phone]  [place] Office: [address] [place] [address], Phone [phone]    Patient Name: Dilma Aranda    Age: 42 year, : 1980  Patient ID: -, MRN #: -, Smart: -    Physician Ordering Inpatient EEG: Dr. Orr  Referral Source to EMU: elective admission – Dr. Willingham    EMU Study Started: 12:21 on 22    EMU Study Ended: pending discharge    Study Information:    EEG Recording Technique:  The patient underwent continuous Video-EEG monitoring, using Telemetry System hardware on the XLTek Digital System. EEG and video data were stored on a computer hard drive with important events saved in digital archive files. The material was reviewed by a physician (electroencephalographer / epileptologist) on a daily basis. Kevin and seizure detection algorithms were utilized and reviewed. An EEG Technician attended to the patient, and was available throughout daytime work hours.  The epilepsy center neurologist was available in person or on call 24-hours per day.    EEG Placement and Labeling of Electrodes:  The EEG was performed utilizing 20 channel referential EEG connections (coronal over temporal over parasagittal montage) using all standard 10-20 electrode placements with EKG, with additional electrodes placed in the inferior temporal region using the modified 10-10 montage electrode placements for elective admissions, or if deemed necessary. Recording was at a sampling rate of 256 samples per second per channel. Time synchronized digital video recording was done simultaneously with EEG recording. A low light infrared camera was used for low light recording.               History:  This is a 42 year-old RH female with a history of severe LBP radiation to R>LLE s/p L5-S1 laminectomy/discectomy  (Dr. Jones at [facility]), arachnoiditis and tethering of the nerve roots s/p spinal stimulator placement 2014 and later removal 2016 (Dr. Gutierrez), tethered cord syndrome s/p T12 removal and T8-L2 fusion 2017 (Dr. Murray), Chiari malformation s/p suboccipital craniectomy and C1 laminectomy and placement of bilateral occipital condyle-C2 posterior craniocervical instrumentation and fusion for craniocervical instability 2018 (Dr. Murray) and tonsillar shrinkage 2018 (Dr. Diego), IIH s/p VPS 2020 c/b catheter infection and removal 2021 (Dr. Diego), Su-Danlos syndrome, lacunar infarct in the left thalamus, anxiety and depression who presents to EMU to differentiate epileptic from nonepileptic events.    Seizure onset 2018. Mother heard thud and saw pt convulsing x10m. Since then, has been having frequent seizures refractory to multiple ASM. Never had prolonged EEG recording. Admitted to EMU to better characterize these events.    SEIZURE/SPELL DESCRIPTION AND TYPE:  Type #1  Severity: aura  Onset:   Quality & associated signs/symptoms: Indescribable feeling, change in behavior/facial expression   Duration: 10-15m  Timing: once a week  Modifying factors: unknown trigger   Circadian variation: none    Type #2  Severity: convulsion  Onset:   Quality & associated signs/symptoms: type #1 -> LOC, full convulsion  Duration: aura x 10-15m -> convulsion x2-3m  Timing: once a day for a few days, q 2-3 weeks   Modifying factors: unknown trigger   Circadian variation: none    EPILEPSY TYPE: focal epilepsy VS PNES  HISTORY OF TONIC-CLONIC SZ: yes   HISTORY OF STATUS EPILEPTICUS: yes     SEIZURE RISK FACTORS:  CM s/p suboccipital craniectomy and decompression. VPS s/p removal 2021 d/t catheter infection, and IV vancomycin x4wks. Patient was a product of normal pregnancy and delivery. No history of febrile seizure, TBI or FH of seizures.    CURRENT ASM:  TPM 200mg BID - for headache and seizure  LEV ER 2000mg BID - started IR 10/2018, changed to ER 2022 d/t irritability   CLB 10mg BID – started 2022    PREVIOUS ASM:  Acetazolamide – for IIH, worsened seizures  LCM -  to 2022, inefficacious    IMAGING:   MRI w/o 2018 ([place]): Remonstration of status post Chiari I decompression. Otherwise unremarkable exam.    MRI w/wo 2017 ([facility]): The cerebellar tonsils extend 7 mm below the foramen magnum   and have a peglike configuration compatible with Chiari I malformation.     NEUROPHYSIOLOGY:  rEEG 22 (370): normal awake    NEUROPSYCHOLOGY:   None    Home Antiseizure Medication and Device  TPM 200mg BID - for headache and seizure  LEV ER 2000mg BID   CLB 10mg BID    Interpretation:    Start Date: 2022 – Day 1                                Start Time – 12:21       Duration – 19h 12m    Daily EEG Visual Analysis  Findings: The background was continuous and reactive. During wakefulness, the posterior dominant rhythm consisted of symmetric, well-modulated xx Hz activity, with amplitude to 30 uV, that attenuated to eye opening.     Background Slowing:  No generalized background slowing was present.    Focal Slowing:   None were present.    Sleep Background:  Drowsiness was characterized by fragmentation, attenuation, and slowing of the background activity.    Sleep was characterized by the presence of vertex waves, symmetric sleep spindles and K-complexes.    Other Non-Epileptiform Findings:  None were present.    Interictal Epileptiform Activity:   None were present.    Events:  No events or seizures recorded.    ECG:  The heart rate on single channel ECG was predominantly between 60-70 BPM.    ASM:  /100mg, LEV 2000/1000mg, CLB 10/5mg    Start Date: 2022 – Day 2                                       Start Time – 08:00      Duration – 24h    Daily EEG Visual Analysis  FINDINGS:  The background, artifacts and HR on single channel ECG were similar as previous day.    Interictal Epileptiform Activity:   None were present.    Events:  === Events #1-2 ===	  Seizure type: psychogenic nonepileptic seizure  Electrographic onset location: no ictal rhythm  Electrographic evolution: no ictal rhythm  State at onset: awake    Clinical/EEG Findings  d2 00:20:22		EVENT #1  d2 00:20:30		back arching under sheet, mild shaking/twitching of limbs  d2 00:20:40		back arching, moving under bedsheet  d2 00:20:49		moaning  d2 00:20:53		moaning, back arching  d2 00:20:59		(later told RN she had urinary incontinence)  d2 00:21:12		baseline awake, intact PDR  d2 00:21:19		sighs  d2 00:21:21		ENDS    d2 00:25:00		EVENT #2  d2 00:25:11		similar semiology with normal awake EEG  d2 00:27:47		ENDS    EKG Findings: baseline HR  Event of interest?: yes    ASM:  /50mg, LEV 1000/500mg, CLB 5/2.5mg    Start Date: 2022 – Day 3                                       Start Time – 08:00      Duration – 8h 38m    Daily EEG Visual Analysis  FINDINGS:  The background, artifacts and HR on single channel ECG were similar as previous day.    Interictal Epileptiform Activity:   None were present.    Events:  === Event #3 ===	  Seizure type: psychogenic nonepileptic seizure  Electrographic onset location: no ictal rhythm  Electrographic evolution: no ictal rhythm  State at onset: awake    Clinical/EEG Findings  d1 13:44:16		EVENT #3  d1 13:44:25		eyes closed, BUE extension with flexed wrists by her sides  d1 13:44:31		back arching  d1 13:44:35		RUE shaking  d1 13:46:46		squinting eyes  d1 13:50:40		Patient Event  d1 13:50:43		Patient Event  d1 13:50:44		sighing, pressed event button herself  d1 13:51:53		starts again with back arching  d1 13:52:30		RN walks in  d1 13:52:59		unresponsive to RN  d1 13:54:16		opening her eyes    EKG Findings: mildly elevated HR  Event of interest?: yes    ASM:  TPM 50mg/DC, LEV 500mg AM, CLB 2.5mg/DC    EEG Summary:  Normal EEG in the awake, drowsy and asleep states.  - Three events of interest captured and consistent with PNES.    Impression/Clinical Correlate:   – : no event or seizure   – : 3 habitual events     Findings consistent with PNES. Normal video EEG in awake, drowsy and asleep states.    ________________________________________    Bismark Willingham MD  Director, Epilepsy/EMU - [facility]

## 2022-06-08 NOTE — PROGRESS NOTE ADULT - ASSESSMENT
42yr old with PMH of multiple spine surgeries, intracranial hypertension s/p  shunt - s/p removal 2/2 infection, Su-Danlos syndrome, lacunar infarct in the left thalamus, anxiety and depression, chronic back pain, seizures presents for video EEG for uncontrolled Seizures on current AEDs.     # seizures -ct Video EEG   AEDs management per neuro     # chronic back pain with multiple spine surgeries - on morphine ER 15 BID , oxy 10 tid , tizanidine  # Tobacco abuse - nicotine patch to assist   # asthma - not in flare - ct alb inhaler prn   # Hx CVA - on aspirin   # VTE x - lovenox

## 2022-06-08 NOTE — DISCHARGE NOTE NURSING/CASE MANAGEMENT/SOCIAL WORK - PATIENT PORTAL LINK FT
You can access the FollowMyHealth Patient Portal offered by [facility] by registering at the following website: [email or web address]. By joining [employer]’s FollowMyHealth portal, you will also be able to view your health information using other applications (apps) compatible with our system.

## 2022-06-08 NOTE — PROGRESS NOTE ADULT - SUBJECTIVE AND OBJECTIVE BOX
HAM MCDOWELL    [id number]    42y      Female    CC: seizures  c/o headaches and fatigue is worse after AEDs were reduced in dose    INTERVAL HPI/OVERNIGHT EVENTS: no acute events     REVIEW OF SYSTEMS:    CONSTITUTIONAL: No fever, fatigue  RESPIRATORY: No cough, wheezing,  No shortness of breath  CARDIOVASCULAR: No chest pain, palpitations  GASTROINTESTINAL: No abdominal or epigastric pain. No nausea, vomiting        Vital Signs Last 24 Hrs  T(C): 36.9 (08 Jun 2022 11:12), Max: 36.9 (08 Jun 2022 11:12)  T(F): 98.5 (08 Jun 2022 11:12), Max: 98.5 (08 Jun 2022 11:12)  HR: 76 (08 Jun 2022 13:58) (63 - 76)  BP: 111/72 (08 Jun 2022 13:58) (94/64 - 111/72)  BP(mean): --  RR: 18 (08 Jun 2022 13:58) (16 - 19)  SpO2: 100% (08 Jun 2022 13:58) (95% - 100%)        PHYSICAL EXAM:    GENERAL: NAD, well-groomed  HEENT: PERRL, +EOMI  NECK: soft, Supple, No JVD   CHEST/LUNG: Clear to percussion bilaterally; No wheezing  HEART: S1S2+, Regular rate and rhythm; No murmur  ABDOMEN: Soft, Nontender, Nondistended; Bowel sounds present  EXTREMITIES:  No clubbing, cyanosis, or edema  SKIN: No rashes or lesions  NEURO: AAOX3        LABS:                            13.2   6.76  )-----------( 215      ( 06 Jun 2022 16:40 )             39.9   06-06    141  |  111<H>  |  15.0  ----------------------------<  99  3.5   |  20.0<L>  |  0.75    Ca    8.5<L>      06 Jun 2022 16:40    TPro  5.8<L>  /  Alb  3.7  /  TBili  0.2<L>  /  DBili  x   /  AST  16  /  ALT  28  /  AlkPhos  82  06-06          MEDICATIONS  (STANDING):  aspirin  chewable 81 milliGRAM(s) Oral daily  enoxaparin Injectable 40 milliGRAM(s) SubCutaneous every 24 hours  famotidine    Tablet 40 milliGRAM(s) Oral daily  levETIRAcetam 250 milliGRAM(s) Oral two times a day  loratadine 10 milliGRAM(s) Oral daily  morphine ER Tablet 15 milliGRAM(s) Oral two times a day  nicotine -  14 mG/24Hr(s) Patch 1 patch Transdermal daily  oxyCODONE    IR 10 milliGRAM(s) Oral three times a day  sertraline 50 milliGRAM(s) Oral daily    MEDICATIONS  (PRN):  ALBUTerol    90 MICROgram(s) HFA Inhaler 2 Puff(s) Inhalation every 6 hours PRN Shortness of Breath and/or Wheezing  LORazepam   Injectable 2 milliGRAM(s) IV Push once PRN Seizure activity        RADIOLOGY & ADDITIONAL TESTS:

## 2022-06-09 DIAGNOSIS — F44.5 CONVERSION DISORDER WITH SEIZURES OR CONVULSIONS: ICD-10-CM

## 2022-06-09 DIAGNOSIS — G40.804 OTHER EPILEPSY, INTRACTABLE, W/OUT STATUS EPILEPTICUS: ICD-10-CM

## 2022-06-09 RX ORDER — LEVETIRACETAM 500 MG/1
500 TABLET, FILM COATED, EXTENDED RELEASE ORAL TWICE DAILY
Qty: 720 | Refills: 3 | Status: COMPLETED | COMMUNITY
Start: 2022-05-17 | End: 2022-06-09

## 2022-06-09 RX ORDER — CLOBAZAM 10 MG/1
10 TABLET ORAL TWICE DAILY
Qty: 60 | Refills: 5 | Status: COMPLETED | COMMUNITY
Start: 2022-05-17 | End: 2022-06-09

## 2022-06-09 RX ORDER — LACOSAMIDE 150 MG/1
150 TABLET ORAL
Qty: 60 | Refills: 0 | Status: COMPLETED | COMMUNITY
Start: 2022-05-17 | End: 2022-06-09

## 2022-06-09 RX ORDER — PYRIDOXINE HCL (VITAMIN B6) 50 MG
50 TABLET ORAL DAILY
Qty: 180 | Refills: 3 | Status: COMPLETED | COMMUNITY
Start: 2022-05-17 | End: 2022-06-09

## 2022-07-13 NOTE — ED PROVIDER NOTE - NSTIMEPROVIDERCAREINITIATE_GEN_ER
----- Message from Vikas Ortega MD sent at 7/13/2022  7:45 AM CDT -----  Call patient and inform him that hi fasting glucose was mildly elevated. Also his cholesterol and triglyceride levels were elevated. Will mail information on dietary and lifestyle modifications that can improve numbers.     Vikas Ortega MD       13-Feb-2018 11:56

## 2022-08-17 ENCOUNTER — APPOINTMENT (OUTPATIENT)
Dept: NEUROLOGY | Facility: CLINIC | Age: 42
End: 2022-08-17

## 2022-09-27 NOTE — ED PROVIDER NOTE - CROS ED NEURO POS
BMI: BMI (kg/m2): 23.6 (09-26-22 @ 12:32)  HbA1c:   Glucose:   BP: 178/90 (09-26-22 @ 12:32) (178/90 - 178/90)  Lipid Panel: Date/Time: 09-26-22 @ 17:02  Cholesterol, Serum: 154  Direct LDL: --  HDL Cholesterol, Serum: 50  Total Cholesterol/HDL Ration Measurement: --  Triglycerides, Serum: 97   HEADACHE BMI: BMI (kg/m2): 23.6 (09-26-22 @ 12:32)  BP: 178/90 (09-26-22 @ 12:32) (178/90 - 178/90)  Lipid Panel: Date/Time: 09-26-22 @ 17:02  Cholesterol, Serum: 154  HDL Cholesterol, Serum: 50  Triglycerides, Serum: 97

## 2022-10-03 NOTE — PROCEDURE NOTE - NSAPPROACH_GEN_A_CORE
[facility]     Progress Note    Patient Name: Morgan Quick  : 1955  MRN: [id number]  Primary Care Physician:  Wayne Saavedra MD  Date of admission: 2022  Service date and time: 10/03/22 10:34 EDT  Subjective   Subjective     Chief Complaint: abdominal pain     HPI:  Patient doing fine, no complaints, awaiting SNF placement      Objective   Objective     Vitals:   Temp:  [97.8 °F (36.6 °C)-98.2 °F (36.8 °C)] 97.8 °F (36.6 °C)  Heart Rate:  [58-70] 58  Resp:  [16-20] 20  BP: (104-125)/(48-70) 125/63  Flow (L/min):  [2] 2  Physical Exam    Constitutional: Awake, alert   Eyes: PERRLA, sclerae anicteric, no conjunctival injection   HENT: NCAT, mucous membranes moist   Neck: Supple, no thyromegaly, no lymphadenopathy, trachea midline   Respiratory: Clear to auscultation bilaterally, nonlabored respirations    Cardiovascular: RRR, no murmurs, rubs, or gallops, palpable pedal pulses bilaterally   Gastrointestinal: Positive bowel sounds, soft, nontender, nondistended   Musculoskeletal: No bilateral ankle edema, no clubbing or cyanosis to extremities   Psychiatric: Appropriate affect, cooperative   Neurologic: Oriented x 3, strength symmetric in all extremities, Cranial Nerves grossly intact to confrontation, speech clear   Skin: No rashes     Result Review    Result Review:  I have personally reviewed the results from the time of this admission to 10/3/2022 10:34 EDT and agree with these findings:  [x]  Laboratory list / accordion  [x]  Microbiology  [x]  Radiology  [x]  EKG/Telemetry   []  Cardiology/Vascular   []  Pathology  []  Old records  []  Other:        Assessment & Plan   Assessment / Plan       Active Hospital Problems:  Active Hospital Problems    Diagnosis    • **Abdominal pain, unspecified abdominal location    • Bipolar 1 disorder (HCC)    • High cholesterol    • Anorexia    • Nausea    • Morbidly obese (HCC)    • Essential hypertension    Physical Deconditioning    Plan:       Hyponatremia  - workup in process, f/u results, Na 124 today, cont lasix  - appears to be euvolemic    Abdominal pain, nausea - improved  CT>Strandy density in the anterior pararenal space inferior to the  pancreas and adjacent to the horizontal limb of the duodenal C-loop. I  would favor duodenitis versus less likely acute pancreatitis. I would  recommend correlation with pancreatic enzyme levels.  2. There is a hypodense area in the left lobe of the liver near the main  portal vein which may represent a small cyst or cavernous hemangioma. It  was not seen well on the previous exam. The patient reportedly has a  history of left breast cancer so I cannot completely exclude metastatic  Disease.  Per Pt no BM.  Repeat CT of abdomen states moderate stool burden.  Pt states, still no BM.  Will order Miralax 34g Q4H till BM.  Then can stop.  Pt should have MRI as out pt with and without contrast to evaluate liver lesion.     Duodenitis - resolved  -zophran IV  -GI following,  S/P EGD.  Showed duodenitis on EGD and a benign appearing nodule, that was biopsied.  -Protonix and Carafate.  -lovenox bid  -Diet was advanced.  -On yesterday CT, duodenitis resolved.     Constipation/Fecal retention -resolved  - having bm's  -Doc sodium/miralax  -No BM with Miralax.  Will order Mag Citrate and f/u with BMs.    -Per Pt no BM.  Repeat CT of abdomen states moderate stool burden.  -Pt states, still no BM.  Will order Miralax 34g Q4H till BM.  Then can stop.     Hypocalcemia/hypokalemia- replace/monitor     Elevated D dimer:  VQ-scan is negative.     Acute hypoxic respiratory failure  - failed 6 minute walk test, will need home oxygen setup, currently on 2 liters  -Secondary to fluid overload.  -cont lasix  -abg reviewed  - Bipap prn, needs outpatient sleep study.  -Pulmonology following     Anemia:  - cont to monitor  -Transfused a unit of blood.  -Appropriate compensation post transfusion.    Physical Deconditioning - PT/OT, needs  SNF, SW following for placement    DVT prophylaxis:  Mechanical DVT prophylaxis orders are present.    CODE STATUS:   Level Of Support Discussed With: Patient  Code Status (Patient has no pulse and is not breathing): CPR (Attempt to Resuscitate)  Medical Interventions (Patient has pulse or is breathing): Full Support  Release to patient: Routine Release    Disposition:  I expect patient to be discharged 2 days.    Gerardo Gant MD   peripheral

## 2022-12-13 NOTE — ED ADULT NURSE NOTE - TEMPLATE LIST FOR HEAD TO TOE ASSESSMENT
Danette Barclay (:  1971) is a Established patient, here for evaluation of the following:    Assessment & Plan   Below is the assessment and plan developed based on review of pertinent history, physical exam, labs, studies, and medications. 1. Uncontrolled type 2 diabetes mellitus with hyperglycemia ([address]  Will trial XR metformin daily with food and ok for int. Fasting   Patient advised to follow a low carb, low sugar, healthy fat ( avocados, nuts, olive oil etc.) diet and 150 minutes of cardiovascular exercise per week   Continue to monitor BG closely  Increase activity as able/tolerating  Recheck a1c beginning of 2023  Monitor feet daily    -     metFORMIN (GLUCOPHAGE XR) 750 MG extended release tablet; Take 2 tablets by mouth daily With food, Disp-60 tablet, R-3Normal  -     Hemoglobin A1C; Future    Return in about 3 months (around 3/13/2023) for return for diabetes management. Subjective   HPI  Patient calling in today for update on type 2 diabetes. 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 States he is slowly getting back to his normal lifestyle and much more active and wonders if he can change his metformin dose to once a day as he does better with intermittent fasting this way. Sugar this morning was 168. He is trying to follow a low sugar low-carb diet. Numbness, tingling, constipation, early satiety or any wounds to his feet. He is up-to-date with his eye doctor. Would like to hold off on any new medications for now and recheck labs in a few months.         Review of Systems       Objective   Patient-Reported Vitals  No data recorded     Physical Exam  [INSTRUCTIONS:  \"[x]\" Indicates a positive item  \"[]\" Indicates a negative item  -- DELETE ALL ITEMS NOT EXAMINED]    Constitutional: [x] Appears well-developed and well-nourished [x] No apparent distress      [] Abnormal -     Mental status: [x] Alert and awake  [x] Oriented to person/place/time [x] Able to follow commands    [] Abnormal -     Eyes:   EOM    [x]  Normal    [] Abnormal -   Sclera  [x]  Normal    [] Abnormal -          Discharge [x]  None visible   [] Abnormal -     HENT: [x] Normocephalic, atraumatic  [] Abnormal -   [x] Mouth/Throat: Mucous membranes are moist    External Ears [x] Normal  [] Abnormal -    Neck: [x] No visualized mass [] Abnormal -     Pulmonary/Chest: [x] Respiratory effort normal   [x] No visualized signs of difficulty breathing or respiratory distress        [] Abnormal -      Musculoskeletal:   [x] Normal gait with no signs of ataxia         [x] Normal range of motion of neck        [] Abnormal -     Neurological:        [x] No Facial Asymmetry (Cranial nerve 7 motor function) (limited exam due to video visit)          [x] No gaze palsy        [] Abnormal -          Skin:        [x] No significant exanthematous lesions or discoloration noted on facial skin         [] Abnormal -            Psychiatric:       [x] Normal Affect [] Abnormal -        [x] No Hallucinations    Other pertinent observable physical exam findings:-           Please note that this chart was generated using voice recognition Dragon dictation software. Although every effort was made to ensure the accuracy of this automated transcription, some errors in transcription may have occurred. Kory Sharp, was evaluated through a synchronous (real-time) audio-video encounter. The patient (or guardian if applicable) is aware that this is a billable service, which includes applicable co-pays. This Virtual Visit was conducted with patient's (and/or legal guardian's) consent. The visit was conducted pursuant to the emergency declaration under the [address] [place] [address], [address] [place] [address] authority and the [employer] Act.   Patient identification was verified, and a caregiver was present when appropriate. The patient was located at Home: [address] [place] [address] [address] [address].    Provider was located at Home ([facility]): Jeet Joseph 12, MARBELLA - CNP General

## 2022-12-16 NOTE — H&P PST ADULT - GENITOURINARY
negative Protopic Pregnancy And Lactation Text: This medication is Pregnancy Category C. It is unknown if this medication is excreted in breast milk when applied topically.

## 2023-02-03 NOTE — ED ADULT TRIAGE NOTE - SOURCE OF INFORMATION
Patient

## 2023-02-09 ENCOUNTER — APPOINTMENT (OUTPATIENT)
Dept: NEUROSURGERY | Facility: CLINIC | Age: 43
End: 2023-02-09

## 2023-02-10 ENCOUNTER — APPOINTMENT (OUTPATIENT)
Dept: ORTHOPEDIC SURGERY | Facility: CLINIC | Age: 43
End: 2023-02-10

## 2023-02-19 NOTE — ED ADULT NURSE NOTE - TOBACCO SCREENING (FROM THE ASSIST)
, telephone/Benefits, risks, and possible complications of procedure explained to patient/caregiver who verbalized understanding and gave verbal consent.
Statement Selected

## 2023-03-06 NOTE — ED ADULT TRIAGE NOTE - CHIEF COMPLAINT QUOTE
I had two seizures today. Pt. states Hx of Posterior Fossa Decompression at [facility] last month, has been seen since for possible infection. In ED pt. A&Ox3, answering all questions yes

## 2023-04-26 NOTE — ED ADULT NURSE NOTE - NSSISCREENINGQ2_ED_A_ED
No
You can access the FollowMyHealth Patient Portal offered by [facility] by registering at the following website: [email or web address]. By joining [employer]’s FollowMyHealth portal, you will also be able to view your health information using other applications (apps) compatible with our system.

## 2023-05-30 NOTE — ED ADULT NURSE REASSESSMENT NOTE - NS ED NURSE REASSESS COMMENT FT1
Health Maintenance Due   Topic Date Due   • DTaP/Tdap/Td Vaccine (1 - Tdap) Never done   • Breast Cancer Screening  Never done   • Colorectal Cancer Screen-  10/10/2012   • Osteoporosis Screening  Never done   • Medicare Advantage- Medicare Wellness Visit  Never done   • Depression Screening  05/31/2023       Patient is due for topics as listed above but is not proceeding with Immunization(s) Dtap/Tdap/Td at this time.      Patient A&OX3, c/o mild headache at this time. VSS. CM in place. tachy on monitor. Dr Gissel leon.

## 2023-12-30 NOTE — H&P PST ADULT - BACK
You can access the FollowMyHealth Patient Portal offered by [facility] by registering at the following website: [email or web address]. By joining [employer]’s FollowMyHealth portal, you will also be able to view your health information using other applications (apps) compatible with our system. You can access the FollowMyHealth Patient Portal offered by [facility] by registering at the following website: [email or web address]. By joining [employer]’s FollowMyHealth portal, you will also be able to view your health information using other applications (apps) compatible with our system. detailed exam

## 2024-05-02 ENCOUNTER — EMERGENCY (EMERGENCY)
Facility: HOSPITAL | Age: 44
LOS: 1 days | Discharge: HOME | End: 2024-05-02
Attending: EMERGENCY MEDICINE
Payer: MEDICARE

## 2024-05-02 VITALS
TEMPERATURE: 98 F | RESPIRATION RATE: 20 BRPM | HEIGHT: 65 IN | DIASTOLIC BLOOD PRESSURE: 127 MMHG | SYSTOLIC BLOOD PRESSURE: 160 MMHG | OXYGEN SATURATION: 98 % | HEART RATE: 127 BPM | WEIGHT: 180.78 LBS

## 2024-05-02 VITALS
RESPIRATION RATE: 23 BRPM | OXYGEN SATURATION: 85 % | HEART RATE: 84 BPM | TEMPERATURE: 98 F | DIASTOLIC BLOOD PRESSURE: 88 MMHG | SYSTOLIC BLOOD PRESSURE: 115 MMHG

## 2024-05-02 DIAGNOSIS — Z98.890 OTHER SPECIFIED POSTPROCEDURAL STATES: Chronic | ICD-10-CM

## 2024-05-02 DIAGNOSIS — Z98.89 OTHER SPECIFIED POSTPROCEDURAL STATES: Chronic | ICD-10-CM

## 2024-05-02 DIAGNOSIS — Z98.2 PRESENCE OF CEREBROSPINAL FLUID DRAINAGE DEVICE: Chronic | ICD-10-CM

## 2024-05-02 DIAGNOSIS — Q06.8 OTHER SPECIFIED CONGENITAL MALFORMATIONS OF SPINAL CORD: Chronic | ICD-10-CM

## 2024-05-02 LAB
ALBUMIN SERPL ELPH-MCNC: 4.2 G/DL — SIGNIFICANT CHANGE UP (ref 3.3–5.2)
ALP SERPL-CCNC: 130 U/L — HIGH (ref 40–120)
ALT FLD-CCNC: 73 U/L — HIGH
ANION GAP SERPL CALC-SCNC: 12 MMOL/L — SIGNIFICANT CHANGE UP (ref 5–17)
APTT BLD: 32.3 SEC — SIGNIFICANT CHANGE UP (ref 24.5–35.6)
AST SERPL-CCNC: 28 U/L — SIGNIFICANT CHANGE UP
BASOPHILS # BLD AUTO: 0.04 K/UL — SIGNIFICANT CHANGE UP (ref 0–0.2)
BASOPHILS NFR BLD AUTO: 0.5 % — SIGNIFICANT CHANGE UP (ref 0–2)
BILIRUB SERPL-MCNC: <0.2 MG/DL — LOW (ref 0.4–2)
BUN SERPL-MCNC: 9.9 MG/DL — SIGNIFICANT CHANGE UP (ref 8–20)
CALCIUM SERPL-MCNC: 9.6 MG/DL — SIGNIFICANT CHANGE UP (ref 8.4–10.5)
CHLORIDE SERPL-SCNC: 107 MMOL/L — SIGNIFICANT CHANGE UP (ref 96–108)
CO2 SERPL-SCNC: 26 MMOL/L — SIGNIFICANT CHANGE UP (ref 22–29)
CREAT SERPL-MCNC: 0.62 MG/DL — SIGNIFICANT CHANGE UP (ref 0.5–1.3)
EGFR: 113 ML/MIN/1.73M2 — SIGNIFICANT CHANGE UP
EOSINOPHIL # BLD AUTO: 0.13 K/UL — SIGNIFICANT CHANGE UP (ref 0–0.5)
EOSINOPHIL NFR BLD AUTO: 1.6 % — SIGNIFICANT CHANGE UP (ref 0–6)
GLUCOSE SERPL-MCNC: 94 MG/DL — SIGNIFICANT CHANGE UP (ref 70–99)
HCT VFR BLD CALC: 44.1 % — SIGNIFICANT CHANGE UP (ref 34.5–45)
HGB BLD-MCNC: 15 G/DL — SIGNIFICANT CHANGE UP (ref 11.5–15.5)
IMM GRANULOCYTES NFR BLD AUTO: 0.4 % — SIGNIFICANT CHANGE UP (ref 0–0.9)
INR BLD: 0.94 RATIO — SIGNIFICANT CHANGE UP (ref 0.85–1.18)
LYMPHOCYTES # BLD AUTO: 2.24 K/UL — SIGNIFICANT CHANGE UP (ref 1–3.3)
LYMPHOCYTES # BLD AUTO: 27.1 % — SIGNIFICANT CHANGE UP (ref 13–44)
MCHC RBC-ENTMCNC: 31.3 PG — SIGNIFICANT CHANGE UP (ref 27–34)
MCHC RBC-ENTMCNC: 34 GM/DL — SIGNIFICANT CHANGE UP (ref 32–36)
MCV RBC AUTO: 92.1 FL — SIGNIFICANT CHANGE UP (ref 80–100)
MONOCYTES # BLD AUTO: 0.47 K/UL — SIGNIFICANT CHANGE UP (ref 0–0.9)
MONOCYTES NFR BLD AUTO: 5.7 % — SIGNIFICANT CHANGE UP (ref 2–14)
NEUTROPHILS # BLD AUTO: 5.35 K/UL — SIGNIFICANT CHANGE UP (ref 1.8–7.4)
NEUTROPHILS NFR BLD AUTO: 64.7 % — SIGNIFICANT CHANGE UP (ref 43–77)
PLATELET # BLD AUTO: 270 K/UL — SIGNIFICANT CHANGE UP (ref 150–400)
POTASSIUM SERPL-MCNC: 3.7 MMOL/L — SIGNIFICANT CHANGE UP (ref 3.5–5.3)
POTASSIUM SERPL-SCNC: 3.7 MMOL/L — SIGNIFICANT CHANGE UP (ref 3.5–5.3)
PROT SERPL-MCNC: 6.8 G/DL — SIGNIFICANT CHANGE UP (ref 6.6–8.7)
PROTHROM AB SERPL-ACNC: 10.5 SEC — SIGNIFICANT CHANGE UP (ref 9.5–13)
RBC # BLD: 4.79 M/UL — SIGNIFICANT CHANGE UP (ref 3.8–5.2)
RBC # FLD: 12.8 % — SIGNIFICANT CHANGE UP (ref 10.3–14.5)
SODIUM SERPL-SCNC: 145 MMOL/L — SIGNIFICANT CHANGE UP (ref 135–145)
TROPONIN T, HIGH SENSITIVITY RESULT: 6 NG/L — SIGNIFICANT CHANGE UP (ref 0–51)
WBC # BLD: 8.26 K/UL — SIGNIFICANT CHANGE UP (ref 3.8–10.5)
WBC # FLD AUTO: 8.26 K/UL — SIGNIFICANT CHANGE UP (ref 3.8–10.5)

## 2024-05-02 PROCEDURE — 0042T: CPT | Mod: MC

## 2024-05-02 PROCEDURE — 70450 CT HEAD/BRAIN W/O DYE: CPT | Mod: MC

## 2024-05-02 PROCEDURE — 36415 COLL VENOUS BLD VENIPUNCTURE: CPT

## 2024-05-02 PROCEDURE — 70496 CT ANGIOGRAPHY HEAD: CPT | Mod: 26,MC

## 2024-05-02 PROCEDURE — 70498 CT ANGIOGRAPHY NECK: CPT | Mod: 26,MC

## 2024-05-02 PROCEDURE — 96374 THER/PROPH/DIAG INJ IV PUSH: CPT | Mod: XU

## 2024-05-02 PROCEDURE — 93010 ELECTROCARDIOGRAM REPORT: CPT

## 2024-05-02 PROCEDURE — 82962 GLUCOSE BLOOD TEST: CPT

## 2024-05-02 PROCEDURE — 96375 TX/PRO/DX INJ NEW DRUG ADDON: CPT | Mod: XU

## 2024-05-02 PROCEDURE — 70450 CT HEAD/BRAIN W/O DYE: CPT | Mod: 26,MC,XU

## 2024-05-02 PROCEDURE — 84484 ASSAY OF TROPONIN QUANT: CPT

## 2024-05-02 PROCEDURE — 80053 COMPREHEN METABOLIC PANEL: CPT

## 2024-05-02 PROCEDURE — 85730 THROMBOPLASTIN TIME PARTIAL: CPT

## 2024-05-02 PROCEDURE — 93005 ELECTROCARDIOGRAM TRACING: CPT

## 2024-05-02 PROCEDURE — 85610 PROTHROMBIN TIME: CPT

## 2024-05-02 PROCEDURE — 70498 CT ANGIOGRAPHY NECK: CPT | Mod: MC

## 2024-05-02 PROCEDURE — 99285 EMERGENCY DEPT VISIT HI MDM: CPT

## 2024-05-02 PROCEDURE — 70496 CT ANGIOGRAPHY HEAD: CPT | Mod: MC

## 2024-05-02 PROCEDURE — 99285 EMERGENCY DEPT VISIT HI MDM: CPT | Mod: 25

## 2024-05-02 PROCEDURE — 85025 COMPLETE CBC W/AUTO DIFF WBC: CPT

## 2024-05-02 RX ORDER — ACETAMINOPHEN 500 MG
1000 TABLET ORAL ONCE
Refills: 0 | Status: COMPLETED | OUTPATIENT
Start: 2024-05-02 | End: 2024-05-02

## 2024-05-02 RX ORDER — KETOROLAC TROMETHAMINE 30 MG/ML
15 SYRINGE (ML) INJECTION ONCE
Refills: 0 | Status: DISCONTINUED | OUTPATIENT
Start: 2024-05-02 | End: 2024-05-02

## 2024-05-02 RX ORDER — DIPHENHYDRAMINE HCL 50 MG
25 CAPSULE ORAL ONCE
Refills: 0 | Status: COMPLETED | OUTPATIENT
Start: 2024-05-02 | End: 2024-05-02

## 2024-05-02 RX ORDER — METOCLOPRAMIDE HCL 10 MG
10 TABLET ORAL ONCE
Refills: 0 | Status: COMPLETED | OUTPATIENT
Start: 2024-05-02 | End: 2024-05-02

## 2024-05-02 RX ORDER — PROCHLORPERAZINE MALEATE 5 MG
10 TABLET ORAL ONCE
Refills: 0 | Status: DISCONTINUED | OUTPATIENT
Start: 2024-05-02 | End: 2024-05-02

## 2024-05-02 RX ADMIN — Medication 25 MILLIGRAM(S): at 18:56

## 2024-05-02 RX ADMIN — Medication 10 MILLIGRAM(S): at 18:56

## 2024-05-02 RX ADMIN — Medication 400 MILLIGRAM(S): at 18:38

## 2024-05-02 RX ADMIN — Medication 15 MILLIGRAM(S): at 18:55

## 2024-05-02 NOTE — ED PROVIDER NOTE - NSFOLLOWUPINSTRUCTIONS_ED_ALL_ED_FT
-Please follow-up with your primary care doctor in the next 2 days.  Please call tomorrow for an appointment.  If you cannot follow-up with your primary care doctor please return to the ED for any urgent issues.  - You were given a copy of the tests performed today.  Please bring the results with you and review them with your primary care doctor.  - If you have any worsening of symptoms or any other concerns please return to the ED immediately.  - Please continue taking your home medications as directed.      Headache    A headache is pain or discomfort felt around the head or neck area. The specific cause of a headache may not be found as there are many types including tension headaches, migraine headaches, and cluster headaches. Watch your condition for any changes. Things you can do to manage your pain include taking over the counter and prescription medications as instructed by your health care provider, lying down in a dark quiet room, limiting stress, getting regular sleep, and refraining from alcohol and tobacco products.    SEEK IMMEDIATE MEDICAL CARE IF YOU HAVE ANY OF THE FOLLOWING SYMPTOMS: fever, vomiting, stiff neck, loss of vision, problems with speech, muscle weakness, loss of balance, trouble walking, passing out, or confusion.

## 2024-05-02 NOTE — ED ADULT TRIAGE NOTE - CHIEF COMPLAINT QUOTE
Pt arrives to ED c/o sudden onset of HA started approx 1 PTA  and  R sided arm weakness  associated with pins and needles sensations- Hs of stroke and chiari malformation . Codes  stroke initiated

## 2024-05-02 NOTE — ED PROVIDER NOTE - CLINICAL SUMMARY MEDICAL DECISION MAKING FREE TEXT BOX
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 NIH 0. CTs without emergent pathology. Pt improved after headache cocktail, wants to go home. Pt provided neuro f/u and strict return precautions.

## 2024-05-02 NOTE — ED PROVIDER NOTE - ATTENDING CONTRIBUTION TO CARE
44-year-old female multiple spinal surgery, intracranial hypertension, Su-Danlos syndrome, lacunar infarct, anxiety and depression presents with severe headache and right arm numbness and tingling for about 6 hours.  No focal neurodeficit on exam.  Patient was close rule concern for intracranial bleed.  CT head, CTA head and neck unremarkable. Lab values do not require emergent intervention.  Pain improved after medication.  Comfortable going home.  Outpatient follow-up.

## 2024-05-02 NOTE — ED PROVIDER NOTE - CARE PROVIDER_API CALL
Harshil Madera  Neurology  [address] [place] [address], [facility] [address]  [place] [address]  Phone: [phone]  Fax: [phone]  Follow Up Time:

## 2024-05-02 NOTE — ED PROVIDER NOTE - PATIENT PORTAL LINK FT
You can access the FollowMyHealth Patient Portal offered by [facility] by registering at the following website: [email or web address]. By joining [employer]’s FollowMyHealth portal, you will also be able to view your health information using other applications (apps) compatible with our system.

## 2024-05-02 NOTE — ED ADULT NURSE REASSESSMENT NOTE - NS ED NURSE REASSESS COMMENT FT1
Assumed care of pt at 19:15 as stated in report from POONAM Liz. Charting as noted. Patient A&O x4. reports improvement in headache. denies CP/SOB. RR even and unlabored on RA. Updated on the plan of care. bed locked in lowest position. IV site flushed w/ NS. No redness, swelling or pain noted to site. No signs of acute distress noted, safety maintained. Pt remains on CM in NSR. English

## 2024-05-02 NOTE — ED PROVIDER NOTE - OBJECTIVE STATEMENT
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

## 2024-05-02 NOTE — ED ADULT TRIAGE NOTE - TEMPERATURE IN CELSIUS (DEGREES C)
CC: pt.here for consult per . Pt.states, \"mostly right eye close up\" is difficult, \"I don't know if it's from the Prednisone I'm on from my oncologist; when I'm watching tv sometimes things come together, double; I haven't had that problem for awhile, when I was on a higher dose of Prednisone, last November-December;\" sun bothers her driving; \"I usually take glasses off driving and watching tv.\"  PMH: HTN, high chol., transient cerebral ischemia, COPD, breast CA  PFH: mom, sis.: DM  Denies known Latex allergy or symptoms of Latex sensitivity.  Medications verified, no changes.  PCP and pharmacy verified: [facility] [employer]  Social History     Tobacco Use   Smoking Status Current Every Day Smoker   • Packs/day: 0.40   • Years: 60.00   • Pack years: 24.00   • Types: Cigarettes   Smokeless Tobacco Never Used   Tobacco Comment    7-8 a day     POH: cataract  Refraction waiver was SIGNED by the patient.      36.7

## 2024-08-07 NOTE — ED PROVIDER NOTE - SEIZURE DURATION
Patient seen 12/16/2022 by MICHAEL Morales. As noted prior, 66 year-old female was seen for abdominal cramping.    Last colonoscopy DAC 3/21/2022 with me with 4 mm polyp in the sigmoid colon.  Nonbleeding internal hemorrhoids otherwise unremarkable colonoscopy.  Suggestion of mild melanosis coli. Pathology report revealed fragments of tubular adenoma.  Recommended patient will repeat colonoscopy in 5 years (2027).  At prior visit, was having BMs every 3-4 days, was taking fiber supplement- taking 15 capsules/day. Had previously tried senna. Had tried Miralax with no improvement. She reports ongoing constipation. Prior Amitiza did not help. Linzess was not covered by insurance.   [facility] ER 7/2024 for severe constipation, CT was done and constipation was noted. Discharged from the ER. Told to take Colace and Senna. Now having daily BM. No rectal bleeding, no unintentional weight loss, no n/v. No f/c.
unknown

## 2024-09-15 NOTE — ED ADULT NURSE REASSESSMENT NOTE - NS ED NURSE REASSESS COMMENT FT1
d/c amb with steady gait assist with cane Mom Present Denies headache Neurosurg saw pt prior to d/c 16-Sep-2024 02:05

## 2024-10-02 NOTE — ED ADULT TRIAGE NOTE - AS HEIGHT TYPE
Reason for Visit
Reason for Visit: 
Diagnoses

Diverticulitis of intestine, part unspecified, without perforation or abscess without bleeding (10/01/24)



Objective Data
Objective Data
Vital Signs: 
Vital Signs

Temp Pulse Resp BP Pulse Ox O2 Del Method
 97.5 F L  64   18   125/66 H  100   Room Air 
 10/02/24 04:00  10/02/24 04:00  10/02/24 04:00  10/02/24 04:00  10/02/24 04:00  10/02/24 04:00



Oxygen Delivery Method         Room Air                                         
Weight:                        292 lb 12.8 oz                                   
Body Mass Index (BMI)          47.2                                             


Intake & Output: 
Intake and Output for Last 24 Hours

 09/30/24 10/01/24 10/02/24
 23:59 23:59 23:59
Intake Total  1050 / 1050 150 / 150
Balance  1050 / 1050 150 / 150


Lab / Micro Data

10/02/24 06:54          

10/02/24 05:53          

Labs: 
Laboratory Results - last 24 hr

10/01/24 14:55: Urine Color Yellow, Urine Clarity Cloudy, Urine pH 7.0, Ur Specific Gravity 1.005, Urine Protein 15 H, Urine Glucose (UA) Normal, Urine Ketones Negative, Urine Occult Blood 50 H, Urine Nitrite Negative, Urine Bilirubin Negative, 
Urine Urobilinogen Normal, Ur Leukocyte Esterase 500 H, Urine RBC 0 SEEN, Urine WBC 25-50 SEEN, Ur Squamous Epith Cells 0-5 SEEN, Urine Bacteria 2+, Urine Mucus 0 SEEN
10/01/24 15:13: WBC 8.1, RBC 5.56 H, Hgb 14.9, Hct 46.1, MCV 82.9, MCH 26.8 L, MCHC 32.3, RDW Std Deviation 48.1 H, RDW Coeff of Elzbieta 15.9 H, Plt Count 310, MPV 9.6, Immature Gran % (Auto) 0.400, Neut % (Auto) 64.0, Lymph % (Auto) 21.4, Mono % (Auto) 
11.0 H, Eos % (Auto) 2.5, Baso % (Auto) 0.7, Absolute Neuts (auto) 5.2, Absolute Lymphs (auto) 1.73, Nucleated RBC % 0, Sodium 140, Potassium 3.5, Chloride 107, Carbon Dioxide 29.0, Anion Gap 5, BUN 8, Creatinine 0.89, Estim Creat Clear Calc 86.50, 
Est GFR (MDRD) Af Amer 82, Est GFR (MDRD) Non-Af 68, BUN/Creatinine Ratio 9.0 L, Glucose 106, Lactic Acid 1.4, Calcium 10.0, Total Bilirubin 0.50, AST 24, ALT 46, Alkaline Phosphatase 95, Troponin I High Sens 6, Total Protein 7.6, Albumin 3.2, 
Globulin 4.4 H, Albumin/Globulin Ratio 0.7 L, Lipase 26
10/01/24 21:03: POC Glucose 91
10/02/24 05:53: Sodium 141, Potassium 3.4 L, Chloride 110 H, Carbon Dioxide 26.0, Anion Gap 5, BUN 7, Creatinine 0.77, Estim Creat Clear Calc 96.87, Est GFR (MDRD) Af Amer 96, Est GFR (MDRD) Non-Af 79, BUN/Creatinine Ratio 9.0 L, Glucose 85, Calcium 
9.6
10/02/24 06:07: POC Glucose 103
10/02/24 06:54: WBC 7.1, RBC 5.39, Hgb 14.5, Hct 45.4, MCV 84.2, MCH 26.9 L, MCHC 31.9 L, RDW Std Deviation 49.4 H, RDW Coeff of Elzbieta 16.2 H, Plt Count 284, MPV 10.2


Radiography
Diagnostic Testing: 
Radiology Impression

Abdomen/Pelvis CT  10/01/24 14:35
IMPRESSION:
 
Persistent acute sigmoid diverticulitis. No focal fluid collection or free
air.
 
Electronically Signed:
Rosas Davenport MD
2024/10/01 at 16:36 EDT
Reading Location ID and State: [id number] / [place]
Tel [phone], Service support  [phone], Fax [phone]
 




Physical Exam
Narrative
Seen and examined.
  Patient has minimal soreness over side of abdomen.  Denies abdominal pain.
No fever.

Physical exam
General: Alert, Oriented x3, Cooperative.  Morbid obesity BMI 47.3 kg/m?.
HEENT: Atraumatic, PERRLA, EOMI, Normocephalic
Oral: No Gingival or Mucosal Lesions/ Ulcerations
Neck: Supple, No JVD, Negative Carotid Bruits
Chest wall/Lungs:  Air entry diminished in bilateral lung bases.  No crepitation/rhonchi
Cardiovascular: Regular rate, Regular Rhythm, Normal S1, Normal S2, No M/G/R
Abdomen: Bowel Sounds Present, Soft, Non Tender, Non-Distended.  No palpable mass
: No dysuria. No renal angle tenderness.  No suprapubic tenderness.
Extremities: No edema, Capillary Refill Less than 3 Seconds
Skin: No rashes, No breakdown
Musculoskeletal: No Tenderness to Palpation of Joints or Extremities.  ROM restricted.
Neurological: Cranial nerves II-XII grossly intact, DTR  2+/4.  No acute focal neurological deficit.
Psych/Mental Status: Normal Affect, Appropriate. 

Assessment & Plan
Assessment/Plan
(1) Diverticulitis: 
PLAN: 
Plan
Patient is a 66-year-old female who presented [facility] ED on 10/1/2024 with worsening abdominal pain.  Patient was diagnosed with diverticulitis in early September and completed 2 weeks of antibiotics

1.  Recurrent mild sigmoid diverticulitis
? Admit under observation status to [facility].  General surgery consulted.  CT abdomen pelvis showed recurrent acute sigmoid diverticulitis with no perforation or abscess noted.  Patient very stable appearing on exam with minimal abdominal pain.  
Hemodynamically stable and afebrile.  IV Zosyn for now.  Advised the diet as needed.
Patient had 2 weeks of antibiotics.  Currently not having diarrhea but soft bowel movement with mucus.  No blood.

Chronic medical conditions:
? Morbid obesity: BMI 47 on admit.  Encouraged lifestyle modifications.  Complicates hospital course, care and prognosis.
? Hypertension: Stable.  Continue home atenolol and chlorthalidone.
? Type 2 diabetes mellitus: Home regimen of metformin 500 mg daily and glipizide 2.5 mg daily.  Blood glucose 106 on admit.  Okay for sliding scale insulin with meals while inpatient.
? History of breast cancer: Continue home exemestane.

DVT prophylaxis: Lovenox twice daily
CODE STATUS: Full code, verified


Charges/Coding
Visit Charges
Inpatient E&M: 04585 Subs Hosp L2 stated

## 2024-12-17 ENCOUNTER — APPOINTMENT (OUTPATIENT)
Dept: ORTHOPEDIC SURGERY | Facility: CLINIC | Age: 44
End: 2024-12-17

## 2025-03-11 NOTE — ED ADULT NURSE NOTE - PRO INTERPRETER NEED 2
3/11/2025   Mikaela Johns (: 1998) is a 26 y.o. female, New patient, here for evaluation of the following chief complaint(s):  Breast Pain (C/o pain under left breast and soreness. Also states has shaky hands and light headed. )     ASSESSMENT/PLAN:  Below is the assessment and plan developed based on review of pertinent history, physical exam, labs, studies, and medications.  Assessment & Plan  Acute costochondritis          Exam and history consistent with acute costochondritis.   -Exam reassuring for no signs of cardiac chest pain or pneumonia  -Muscle Relaxer (Flexeril, Robaxin, Tizanidine) should only be taken if needed, as it may cause drowsiness or dizziness.  Avoid driving.  Avoid alcohol/drugs that can also make you sleepy.  -Alternate between ice and heat.  Heat may be applied for 30 minutes at a time every 4-5 hours.  Take warm epsom salt baths and gentle stretches.  -Ibuprofen 600 mg every 6 hours as needed and Tylenol 500 mg every 6 hours as needed for pain control. Best if used in combination  -Increase water hydration. Please drink at least 64 ounces of fluid a day.  -Avoid lifting heavy objects or aggravating movements/activities  -If you develop severally worsening chest pain/pressure, shortness of breath, or intractable vomiting, please call 911 and/or head to the Emergency Room immediately  -Please follow up with your PCP in the next 1 to 2 weeks     If symptoms persist or worsen, please contact your PCP and/or return to Urgent Care.       Handout given with care instructions  2. OTC for symptom management. Increase fluid intake, ensure adequate nutritional intake.  3. Follow up with PCP as needed.  4. Go to ED with development of any acute symptoms.     Follow up:  No follow-ups on file.  Follow up immediately for any new, worsening or changes or if symptoms are not improving over the next 5-7 days.     SUBJECTIVE/OBJECTIVE:  HPI   Ms Johns presents with concern for pain on her ribs  English

## 2025-03-18 NOTE — ED ADULT NURSE NOTE - BREATH SOUNDS, MLM
Patient Education     Well Child Exam 18 Months   About this topic   Your child's 18-month well child exam is a visit with the doctor to check your child's health. The doctor measures your child's weight, height, and head size. The doctor plots these numbers on a growth curve. The growth curve gives a picture of your child's growth at each visit. The doctor may listen to your child's heart, lungs, and belly. Your doctor will do a full exam of your child from the head to the toes.  Your child may also need shots or blood tests during this visit.  General   Growth and Development   Your doctor will ask you how your child is developing. The doctor will focus on the skills that most children your child's age are expected to do. During this time of your child's life, here are some things you can expect.  Movement - Your child may:  Walk up steps and run  Use a crayon to scribble or make marks  Explore places and things  Throw a ball  Begin to undress themselves  Imitate your actions  Hearing, seeing, and talking - Your child will likely:  Have 10 or 20 words  Point to something interesting to show others  Know one body part  Point to familiar objects or characters in a book  Be able to match pairs of objects  Feeling and behavior - Your child will likely:  Want your love and praise. Hug your child and say I love you often. Say thank you when your child does something nice.  Begin to understand no. Try to use distraction if your child is doing something you do not want them to do.  Begin to have temper tantrums. Ignore them if possible.  Become more stubborn. Your child may shake the head no often. Try to help by giving your child words for feelings.  Play alongside other children.  Be afraid of strangers or cry when you leave.  Feeding - Your child:  Should drink whole milk until 2 years old  Is ready to drink from a cup and may be ready to use a spoon or toddler fork  Will be eating 3 meals and 2 to 3 snacks a day.  However, your child may eat less than before and this is normal.  Should be given a variety of healthy foods and textures. Let your child decide how much to eat.  Should avoid foods that might cause choking like grapes, popcorn, hot dogs, or hard candy.  Should have no more than 4 ounces (120 mL) of fruit juice a day  Will need you to clean the teeth 2 times each day with a child's toothbrush and a smear of toothpaste with fluoride in it.  Sleep - Your child:  Should still sleep in a safe crib. Your child may be ready to sleep in a toddler bed if climbing out of the crib after naps or in the morning.  Is likely sleeping about 10 to 12 hours in a row at night  Most often takes 1 nap each day  Sleeps about a total of 14 hours each day  Should be able to fall asleep without help. If your child wakes up at night, check on your child. Do not pick your child up, offer a bottle, or play with your child. Doing these things will not help your child fall asleep without help.  Should not have a bottle in bed. This can cause tooth decay or ear infections.  Vaccines - It is important for your child to get shots on time. This protects from very serious illnesses like lung infections, meningitis, or infections that harm the nervous system. Your child may also need a flu shot. Check with your doctor to make sure your child's shots are up to date. Your child may need:  DTaP or diphtheria, tetanus, and pertussis vaccine  IPV or polio vaccine  Hep A or hepatitis A vaccine  Hep B or hepatitis B vaccine  Flu or influenza vaccine  Your child may get some of these combined into one shot. This lowers the number of shots your child may get and yet keeps them protected.  Help for Parents   Play with your child.  Go outside as often as you can.  Give your child pots, pans, and spoons or a toy vacuum. Children love to imitate what you are doing.  Cars, trains, and toys to push, pull, or walk behind are fun for this age child. So are puzzles  and animal or people figures.  Help your child pretend. Use an empty cup to take a drink. Push a block and make sounds like it is a car or a boat.  Read to your child. Name the things in the pictures in the book. Talk and sing to your child. This helps your child learn language skills.  Give your child crayons and paper to draw or color on.  Here are some things you can do to help keep your child safe and healthy.  Do not allow anyone to smoke in your home or around your child.  Have the right size car seat for your child and use it every time your child is in the car. Your child should be rear facing until at least 2 years of age or longer.  Be sure furniture, shelves, and televisions are secure and cannot tip over and hurt your child.  Take extra care around water. Close bathroom doors. Never leave your child in the tub alone.  Never leave your child alone. Do not leave your child in the car, in the bath, or at home alone, even for a few minutes.  Avoid long exposure to direct sunlight by keeping your child in the shade. Use sunscreen if shade is not possible.  Protect your child from gun injuries. If you have a gun, use a trigger lock. Keep the gun locked up and the bullets kept in a separate place.  Avoid screen time for children under 2 years old. This means no TV, computers, or video games. They can cause problems with brain development.  Parents need to think about:  Having emergency numbers, including poison control, in your phone or posted near the phone  How to distract your child when doing something you dont want your child to do  Using positive words to tell your child what you want, rather than saying no or what not to do  Watch for signs that your child is ready for potty training, including showing interest in the potty and staying dry for longer periods.  Your next well child visit will most likely be when your child is 2 years old. At this visit your doctor may:  Do a full check up on your  child  Talk about limiting screen time for your child, how well your child is eating, and signs it may be time to start potty training  Talk about discipline and how to correct your child  Give your child the next set of shots  When do I need to call the doctor?   Fever of 100.4°F (38°C) or higher  Has trouble walking or only walks on the toes  Has trouble speaking or following simple instructions  You are worried about your child's development  Last Reviewed Date   2021-09-17  Consumer Information Use and Disclaimer   This generalized information is a limited summary of diagnosis, treatment, and/or medication information. It is not meant to be comprehensive and should be used as a tool to help the user understand and/or assess potential diagnostic and treatment options. It does NOT include all information about conditions, treatments, medications, side effects, or risks that may apply to a specific patient. It is not intended to be medical advice or a substitute for the medical advice, diagnosis, or treatment of a health care provider based on the health care provider's examination and assessment of a patients specific and unique circumstances. Patients must speak with a health care provider for complete information about their health, medical questions, and treatment options, including any risks or benefits regarding use of medications. This information does not endorse any treatments or medications as safe, effective, or approved for treating a specific patient. [employer] and its affiliates disclaim any warranty or liability relating to this information or the use thereof. The use of this information is governed by the Terms of Use, available at https://www.[employer].com/en/know/clinical-effectiveness-terms   Copyright   Copyright © 2024 [employer], [employer] and its affiliates and/or licensors. All rights reserved.  If you have an active [facility] account, please look for your well child questionnaire to come  to your "[employer] account before your next well child visit.  Children under the age of 2 years will be restrained in a rear facing child safety seat.    Clear

## 2025-04-18 NOTE — HISTORY OF PRESENT ILLNESS
[FreeTextEntry1] : PCP: Dr. Villegas\par \par This is a 42 year-old RH female with a history of severe LBP radiation to R>LLE s/p L5-S1 laminectomy/discectomy 2012 (Dr. Jones at [facility]), arachnoiditis and tethering of the nerve roots s/p spinal stimulator placement 6/2014 and later removal 8/2016 (Dr. Gutierrez), tethered cord syndrome s/p T12 removal and T8-L2 fusion 5/2017 (Dr. 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 Murray) and tonsillar shrinkage 8/2018 (Dr. Diego), IIH s/p VPS 2/2020 c/b catheter infection and removal 11/2021 (Dr. 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 Patient has seen multiple NSGs in the past, but only recall one time consultation with a neurologist at [facility] before COVID-19 pandemic.\par \par Seizure onset 5/23/2018. Mother heard thud and saw pt convulsing x10m. Since then, has been having frequent seizures refractory to multiple ASM. Never had prolonged EEG recording. ASM managed by PCP. Last week, 2 convulsions on Thursday, 1 on Friday, 2 on Saturday and 1 on Sunday.\par \par SEIZURE/SPELL DESCRIPTION AND TYPE:\par Type #1\par Severity: aura\par Onset: fall of 2018\par Quality & associated signs/symptoms: Indescribable feeling, change in behavior/facial expression \par Duration: 10-15m\par Timing: once a week\par Modifying factors: unknown trigger \par Circadian variation: none\par \par Type #2\par Severity: convulsion\par Onset: fall of 2018\par Quality & associated signs/symptoms: type #1 -> LOC, full convulsion\par Duration: aura x 10-15m -> convulsion x2-3m\par Timing: once a day for a few days, q 2-3 weeks \par Modifying factors: unknown trigger  \par Circadian variation: none\par \par EPILEPSY TYPE: focal epilepsy VS PNES\par HISTORY OF TONIC-CLONIC SZ: yes \par HISTORY OF STATUS EPILEPTICUS: yes \par \par SEIZURE RISK FACTORS:\par CM s/p suboccipital craniectomy and decompression. VPS s/p removal 11/2021 d/t catheter infection, and IV vancomycin x4wks. Patient was a product of normal pregnancy and delivery. No history of febrile seizure, TBI or FH of seizures.\par \par CURRENT ASM:\par TPM 200mg BID - for headache and seizure\par LEV 1000/500/1000mg - started 10/2018, irritability \par LCM 200mg BID - started 2021, not doing much\par \par PREVIOUS ASM:\par Acetazolamide – for IIH, worsened seizures\par \par IMAGING: \par MRI w/o 5/16/2018 ([place]): Remonstration of status post Chiari I decompression. Otherwise unremarkable exam.\par \par MRI w/wo 9/21/2017 ([facility]): The cerebellar tonsils extend 7 mm below the foramen magnum \par and have a peglike configuration compatible with Chiari I malformation. \par \par NEUROPHYSIOLOGY:\par rEEG 5/17/22 (370): normal awake\par \par NEUROPSYCHOLOGY: \par none\par \par FH:\par noncontributory \par \par SH:\par Cutting down in tobacco. No drug, EtOH.   No

## 2025-07-16 NOTE — ED PROVIDER NOTE - NS ED NOTE AC HIGH RISK COUNTRIES
--DO NOT REPLY - Sent from PACT - If sent to wrong pool, reroute to P ECO Reroute pool --    Message Type:  To: Pharmacy change [place] pharmacy    Reason: Delivered by mail: Patient sprung ankle is is unable to come in.  Preferred pharmacy verified and selected.   [id number] - [facility] [address]  Callback #:   Can a detailed message be left? Yes - Voicemail   Patient has been advised this message will be addressed within:2-3 business days [routine]   No